# Patient Record
Sex: FEMALE | Race: WHITE | NOT HISPANIC OR LATINO | Employment: OTHER | URBAN - METROPOLITAN AREA
[De-identification: names, ages, dates, MRNs, and addresses within clinical notes are randomized per-mention and may not be internally consistent; named-entity substitution may affect disease eponyms.]

---

## 2018-04-09 LAB
ALBUMIN (HISTORICAL): 2.5 MG/DL
ALBUMIN SERPL BCP-MCNC: 4 G/DL (ref 3.5–5.7)
ALP SERPL-CCNC: 63 IU/L (ref 55–165)
ALT SERPL W P-5'-P-CCNC: 14 IU/L (ref 10–30)
ANION GAP SERPL CALCULATED.3IONS-SCNC: 10.1 MM/L
AST SERPL W P-5'-P-CCNC: 13 U/L (ref 7–26)
BACTERIA UR QL AUTO: ABNORMAL
BASOPHILS # BLD AUTO: 0 X3/UL (ref 0–0.3)
BASOPHILS # BLD AUTO: 0.7 % (ref 0–2)
BILIRUB SERPL-MCNC: 0.9 MG/DL (ref 0.3–1)
BILIRUB UR QL STRIP: NEGATIVE
BUN SERPL-MCNC: 16 MG/DL (ref 7–25)
CALCIUM SERPL-MCNC: 9.3 MG/DL (ref 8.6–10.5)
CHLORIDE SERPL-SCNC: 102 MM/L (ref 98–107)
CHOLEST SERPL-MCNC: 104 MG/DL (ref 0–200)
CLARITY UR: ABNORMAL
CO2 SERPL-SCNC: 30 MM/L (ref 21–31)
COLOR UR: YELLOW
CREAT SERPL-MCNC: 0.75 MG/DL (ref 0.6–1.2)
DEPRECATED RDW RBC AUTO: 14.2 % (ref 11.5–14.5)
EGFR (HISTORICAL): > 60 GFR
EGFR AFRICAN AMERICAN (HISTORICAL): > 60 GFR
EOSINOPHIL # BLD AUTO: 0.2 X3/UL (ref 0–0.5)
EOSINOPHIL NFR BLD AUTO: 4.1 % (ref 0–5)
EST. AVERAGE GLUCOSE BLD GHB EST-MCNC: 302 MG/DL
GLUCOSE (HISTORICAL): 224 MG/DL (ref 65–99)
GLUCOSE UR STRIP-MCNC: ABNORMAL MG/DL
HBA1C MFR BLD HPLC: 12.2 % (ref 4–6.2)
HCT VFR BLD AUTO: 40.7 % (ref 37–47)
HDLC SERPL-MCNC: 18 MG/DL (ref 40–60)
HGB BLD-MCNC: 13.4 G/DL (ref 12–16)
HGB UR QL STRIP.AUTO: NEGATIVE
KETONES UR STRIP-MCNC: NEGATIVE MG/DL
LDLC SERPL CALC-MCNC: 56.8 MG/DL (ref 75–193)
LEUKOCYTE ESTERASE UR QL STRIP: ABNORMAL
LYMPHOCYTES # BLD AUTO: 1.9 X3/UL (ref 1.2–4.2)
LYMPHOCYTES NFR BLD AUTO: 35.5 % (ref 20.5–51.1)
MCH RBC QN AUTO: 27.1 PG (ref 26–34)
MCHC RBC AUTO-ENTMCNC: 32.8 G/DL (ref 31–36)
MCV RBC AUTO: 82.6 FL (ref 81–99)
MONOCYTES # BLD AUTO: 0.3 X3/UL (ref 0–1)
MONOCYTES NFR BLD AUTO: 5.3 % (ref 1.7–12)
NEUTROPHILS # BLD AUTO: 2.9 X3/UL (ref 1.4–6.5)
NEUTS SEG NFR BLD AUTO: 54.4 % (ref 42.2–75.2)
NITRITE UR QL STRIP: NEGATIVE
NON-SQ EPI CELLS URNS QL MICRO: ABNORMAL /HPF
OSMOLALITY, SERUM (HISTORICAL): 284 MOSM (ref 262–291)
PH UR STRIP.AUTO: 5.5 [PH] (ref 4.5–8)
PLATELET # BLD AUTO: 303 X3/UL (ref 130–400)
PMV BLD AUTO: 7.9 FL (ref 8.6–11.7)
POTASSIUM SERPL-SCNC: 4.1 MM/L (ref 3.5–5.5)
PROT UR STRIP-MCNC: NEGATIVE MG/DL
RBC # BLD AUTO: 4.93 X6/UL (ref 3.9–5.2)
RBC #/AREA URNS AUTO: ABNORMAL /HPF
SODIUM SERPL-SCNC: 138 MM/L (ref 134–143)
SP GR UR STRIP.AUTO: 1.02 (ref 1–1.03)
TOTAL PROTEIN (HISTORICAL): 6.6 G/DL (ref 6.4–8.9)
TRIGL SERPL-MCNC: 147 MG/DL (ref 44–166)
TSH SERPL DL<=0.05 MIU/L-ACNC: 3.4 UIU/M (ref 0.45–5.33)
UROBILINOGEN UR QL STRIP.AUTO: 0.2 EU/DL (ref 0.2–8)
VLDL CHOLESTEROL (HISTORICAL): 29 MG/DL (ref 5–51)
WBC # BLD AUTO: 5.2 X3/UL (ref 4.8–10.8)
WBC #/AREA URNS AUTO: ABNORMAL /HPF

## 2018-09-10 ENCOUNTER — TRANSCRIBE ORDERS (OUTPATIENT)
Dept: ADMINISTRATIVE | Facility: HOSPITAL | Age: 68
End: 2018-09-10

## 2018-09-10 ENCOUNTER — APPOINTMENT (OUTPATIENT)
Dept: LAB | Facility: CLINIC | Age: 68
End: 2018-09-10
Payer: COMMERCIAL

## 2018-09-10 DIAGNOSIS — R94.31 ABNORMAL FINDING ON EKG: ICD-10-CM

## 2018-09-10 DIAGNOSIS — I25.2 OLD MYOCARDIAL INFARCTION: ICD-10-CM

## 2018-09-10 DIAGNOSIS — R94.31 ABNORMAL FINDING ON EKG: Primary | ICD-10-CM

## 2018-09-10 DIAGNOSIS — R19.7 DIARRHEA, UNSPECIFIED TYPE: ICD-10-CM

## 2018-09-10 DIAGNOSIS — E11.9 TYPE 2 DIABETES MELLITUS WITHOUT COMPLICATION, UNSPECIFIED WHETHER LONG TERM INSULIN USE (HCC): ICD-10-CM

## 2018-09-10 DIAGNOSIS — F41.9 ANXIETY: ICD-10-CM

## 2018-09-10 DIAGNOSIS — I10 ESSENTIAL HYPERTENSION, MALIGNANT: ICD-10-CM

## 2018-09-10 DIAGNOSIS — I25.119 CORONARY ARTERY DISEASE WITH ANGINA PECTORIS, UNSPECIFIED VESSEL OR LESION TYPE, UNSPECIFIED WHETHER NATIVE OR TRANSPLANTED HEART (HCC): ICD-10-CM

## 2018-09-10 DIAGNOSIS — E78.5 HYPERLIPIDEMIA, UNSPECIFIED HYPERLIPIDEMIA TYPE: ICD-10-CM

## 2018-09-10 LAB
ALBUMIN SERPL BCP-MCNC: 3.5 G/DL (ref 3.5–5)
ALP SERPL-CCNC: 47 U/L (ref 46–116)
ALT SERPL W P-5'-P-CCNC: 20 U/L (ref 12–78)
ANION GAP SERPL CALCULATED.3IONS-SCNC: 9 MMOL/L (ref 4–13)
AST SERPL W P-5'-P-CCNC: 14 U/L (ref 5–45)
BILIRUB SERPL-MCNC: 0.84 MG/DL (ref 0.2–1)
BUN SERPL-MCNC: 16 MG/DL (ref 5–25)
CALCIUM SERPL-MCNC: 8.7 MG/DL (ref 8.3–10.1)
CHLORIDE SERPL-SCNC: 106 MMOL/L (ref 100–108)
CHOLEST SERPL-MCNC: 92 MG/DL (ref 50–200)
CO2 SERPL-SCNC: 27 MMOL/L (ref 21–32)
CREAT SERPL-MCNC: 0.76 MG/DL (ref 0.6–1.3)
GFR SERPL CREATININE-BSD FRML MDRD: 80 ML/MIN/1.73SQ M
GLUCOSE P FAST SERPL-MCNC: 71 MG/DL (ref 65–99)
HDLC SERPL-MCNC: 23 MG/DL (ref 40–60)
LDLC SERPL CALC-MCNC: 50 MG/DL (ref 0–100)
NONHDLC SERPL-MCNC: 69 MG/DL
POTASSIUM SERPL-SCNC: 4.1 MMOL/L (ref 3.5–5.3)
PROT SERPL-MCNC: 7 G/DL (ref 6.4–8.2)
SODIUM SERPL-SCNC: 142 MMOL/L (ref 136–145)
TRIGL SERPL-MCNC: 94 MG/DL
TSH SERPL DL<=0.05 MIU/L-ACNC: 3.24 UIU/ML (ref 0.36–3.74)

## 2018-09-10 PROCEDURE — 80061 LIPID PANEL: CPT

## 2018-09-10 PROCEDURE — 84443 ASSAY THYROID STIM HORMONE: CPT

## 2018-09-10 PROCEDURE — 36415 COLL VENOUS BLD VENIPUNCTURE: CPT

## 2018-09-10 PROCEDURE — 80053 COMPREHEN METABOLIC PANEL: CPT

## 2018-09-10 PROCEDURE — 83036 HEMOGLOBIN GLYCOSYLATED A1C: CPT

## 2018-09-11 LAB
EST. AVERAGE GLUCOSE BLD GHB EST-MCNC: 220 MG/DL
HBA1C MFR BLD: 9.3 % (ref 4.2–6.3)

## 2019-01-14 ENCOUNTER — TELEPHONE (OUTPATIENT)
Dept: FAMILY MEDICINE CLINIC | Facility: CLINIC | Age: 69
End: 2019-01-14

## 2019-01-14 DIAGNOSIS — E11.65 UNCONTROLLED TYPE 2 DIABETES MELLITUS WITH HYPERGLYCEMIA (HCC): Primary | ICD-10-CM

## 2019-01-14 NOTE — TELEPHONE ENCOUNTER
Please check old dosage on this medication to see if patient gets three-month supply or one-month supply  Also if this is metformin  mg or plain metformin 500 mg? It is approved to refill a three-month supply  Check her appointment scheduled to see if she has an upcoming appointment  Many patients were [facility]  Also check for Medicare wellness exam and due date

## 2019-01-15 RX ORDER — METFORMIN HYDROCHLORIDE 500 MG/1
TABLET, EXTENDED RELEASE ORAL
Qty: 180 TABLET | Refills: 0 | Status: SHIPPED | OUTPATIENT
Start: 2019-01-15 | End: 2019-05-02 | Stop reason: SDUPTHER

## 2019-01-15 NOTE — TELEPHONE ENCOUNTER
Please check when patient is due for appointment  Lab work an appointment may be due soon if she has not been seen within 6 months  Medication will be refilled this time

## 2019-02-25 ENCOUNTER — OFFICE VISIT (OUTPATIENT)
Dept: FAMILY MEDICINE CLINIC | Facility: CLINIC | Age: 69
End: 2019-02-25
Payer: COMMERCIAL

## 2019-02-25 VITALS
WEIGHT: 201 LBS | OXYGEN SATURATION: 98 % | DIASTOLIC BLOOD PRESSURE: 82 MMHG | SYSTOLIC BLOOD PRESSURE: 142 MMHG | HEIGHT: 65 IN | BODY MASS INDEX: 33.49 KG/M2 | HEART RATE: 78 BPM

## 2019-02-25 DIAGNOSIS — I10 ESSENTIAL HYPERTENSION: ICD-10-CM

## 2019-02-25 DIAGNOSIS — E11.65 UNCONTROLLED TYPE 2 DIABETES MELLITUS WITH HYPERGLYCEMIA (HCC): Primary | ICD-10-CM

## 2019-02-25 DIAGNOSIS — E78.2 MIXED HYPERLIPIDEMIA: ICD-10-CM

## 2019-02-25 PROCEDURE — 3008F BODY MASS INDEX DOCD: CPT | Performed by: FAMILY MEDICINE

## 2019-02-25 PROCEDURE — 3725F SCREEN DEPRESSION PERFORMED: CPT | Performed by: FAMILY MEDICINE

## 2019-02-25 PROCEDURE — 4010F ACE/ARB THERAPY RXD/TAKEN: CPT | Performed by: FAMILY MEDICINE

## 2019-02-25 PROCEDURE — 1036F TOBACCO NON-USER: CPT | Performed by: FAMILY MEDICINE

## 2019-02-25 PROCEDURE — 1160F RVW MEDS BY RX/DR IN RCRD: CPT | Performed by: FAMILY MEDICINE

## 2019-02-25 PROCEDURE — 99214 OFFICE O/P EST MOD 30 MIN: CPT | Performed by: FAMILY MEDICINE

## 2019-02-25 PROCEDURE — 1101F PT FALLS ASSESS-DOCD LE1/YR: CPT | Performed by: FAMILY MEDICINE

## 2019-02-25 RX ORDER — ATORVASTATIN CALCIUM 40 MG/1
40 TABLET, FILM COATED ORAL DAILY
COMMUNITY
End: 2019-02-25 | Stop reason: SDUPTHER

## 2019-02-25 RX ORDER — LISINOPRIL AND HYDROCHLOROTHIAZIDE 20; 12.5 MG/1; MG/1
TABLET ORAL
COMMUNITY
Start: 2011-10-17 | End: 2019-02-25 | Stop reason: CLARIF

## 2019-02-25 RX ORDER — IBUPROFEN 200 MG
TABLET ORAL EVERY 6 HOURS PRN
COMMUNITY
End: 2019-06-04

## 2019-02-25 RX ORDER — LISINOPRIL 5 MG/1
5 TABLET ORAL DAILY
COMMUNITY
End: 2019-02-25 | Stop reason: SDUPTHER

## 2019-02-25 RX ORDER — METOPROLOL SUCCINATE 50 MG/1
25 TABLET, EXTENDED RELEASE ORAL DAILY
Qty: 90 TABLET | Refills: 1 | Status: SHIPPED | OUTPATIENT
Start: 2019-02-25 | End: 2019-06-04

## 2019-02-25 RX ORDER — METOPROLOL SUCCINATE 50 MG/1
25 TABLET, EXTENDED RELEASE ORAL DAILY
COMMUNITY
End: 2019-02-25 | Stop reason: SDUPTHER

## 2019-02-25 RX ORDER — LISINOPRIL 5 MG/1
5 TABLET ORAL DAILY
Qty: 90 TABLET | Refills: 1 | Status: SHIPPED | OUTPATIENT
Start: 2019-02-25 | End: 2019-09-14 | Stop reason: SDUPTHER

## 2019-02-25 RX ORDER — ALPRAZOLAM 0.5 MG/1
0.5 TABLET ORAL
COMMUNITY
Start: 2011-10-17 | End: 2019-07-09

## 2019-02-25 RX ORDER — ATORVASTATIN CALCIUM 40 MG/1
40 TABLET, FILM COATED ORAL DAILY
Qty: 90 TABLET | Refills: 1 | Status: SHIPPED | OUTPATIENT
Start: 2019-02-25 | End: 2019-10-16 | Stop reason: SDUPTHER

## 2019-02-25 NOTE — ASSESSMENT & PLAN NOTE
Mixed hyperlipidemia labs show cholesterol levels are down based on her medications and her statin drug I will continue her on this at this point and follow up at next office visit

## 2019-02-25 NOTE — PATIENT INSTRUCTIONS
Diabetes in the Older Adult   AMBULATORY CARE:   What you need to know if you are an older adult with diabetes:  Older adults with diabetes are at risk for heart disease, stroke, kidney disease, blindness, and nerve damage  You may also be at risk for any of the following:  · Poor nutrition or low blood sugar levels    · Confusion or problems with memory, attention, or learning new things    · Trouble controlling urination or frequent urinary tract infections    · Trouble with coordination or balance    · Falls and injuries    · Pain    · Depression    · Open sores on your legs or feet  The ABCs of diabetes: The ABCs stand for certain things you can do to manage or prevent problems caused by diabetes:  · A  stands for A1c test   This test shows the average amount of sugar in your blood over the past 2 to 3 months  High levels of sugar in your blood can cause damage to your heart, blood vessels, kidneys, feet, and eyes  Most older adults with diabetes should have an A1c level less than 7 5  Ask your healthcare provider if this A1c goal is right for you  Your provider can help you make changes if your A1c is too high  · B  stands for blood pressure   High blood pressure can increase your risk for a heart attack, stroke, or kidney disease  Most older adults with diabetes should have a systolic blood pressure (first number) of 140  Your diastolic blood pressure (second number) should be below 90  Ask your healthcare provider if these blood pressure goals are right for you  · C  stands for cholesterol   High levels of cholesterol can block your arteries and cause a heart attack or stroke  Ask your healthcare provider what your cholesterol levels should be  · S  stands for stop smoking   Nicotine and other chemicals in cigarettes and cigars can cause lung damage and make it more difficult to manage your diabetes    Call 911 if you have any of the following:   · You have any of the following signs of a stroke: ¨ Numbness or drooping on one side of your face     ¨ Weakness in an arm or leg    ¨ Confusion or difficulty speaking    ¨ Dizziness, a severe headache, or vision loss    · You have any of the following signs of a heart attack:      ¨ Squeezing, pressure, or pain in your chest that lasts longer than 5 minutes or returns    ¨ Discomfort or pain in your back, neck, jaw, stomach, or arm     ¨ Trouble breathing    ¨ Nausea or vomiting    ¨ Lightheadedness or a sudden cold sweat, especially with chest pain or trouble breathing  Seek care immediately if:   · You have severe abdominal pain, or the pain spreads to your back  You may also be vomiting  · You have trouble staying awake or focusing  · You are shaking or sweating  · You have blurred or double vision  · Your breath has a fruity, sweet smell  · Your breathing is deep and labored, or rapid and shallow  · Your heartbeat is fast and weak  · You fall and get hurt  Contact your healthcare provider if:   · You are vomiting or have diarrhea  · You have an upset stomach and cannot eat the foods on your meal plan  · You feel weak or more tired than usual      · You feel dizzy, have headaches, or are easily irritated  · Your skin is red, warm, dry, or swollen  · You have a wound that does not heal      · You have numbness in your arms or legs  · You have trouble coping with your illness, or you feel anxious or depressed  · You have problems with your memory  · You have changes in your vision  · You have questions or concerns about your condition or care  Treatment for diabetes  includes keeping your blood sugar at a normal level  You must eat the right foods, and exercise regularly  You may need medicine if you cannot control your blood sugar level with nutrition and exercise  You may also need medicine to lower your blood pressure or cholesterol, or medicine to prevent blood clots     Manage the ABCs and prevent problems caused by diabetes:   · Check your blood sugar levels as directed  Your healthcare provider will tell you when and how often to check during the day  Your healthcare provider will also tell you what your blood sugar levels should be before and after a meal  You may need to check for ketones in your urine or blood if your level is higher than directed  Write down your results and show them to your healthcare provider  Your provider may use the results to make changes to your medicine, food, or exercise schedules  Ask your healthcare provider for more information about how to treat a high or low blood sugar level  · Follow your meal plan as directed  A dietitian will help you make a meal plan to keep your blood sugar level steady and make sure you get enough nutrition  Do not skip meals  Your blood sugar level may drop too low if you have taken diabetes medicine and do not eat  Ask your healthcare provider about programs in your community that can deliver the meals to your home  · Try to be active for 30 to 60 minutes most days of the week  Exercise can help keep your blood sugar level steady, decrease your risk of heart disease, and help you lose weight  It can also help improve your balance and decrease your risk for falls  Work with your healthcare provider to create an exercise plan  Ask a family member or friend to exercise with you  Start slow and exercise for 5 to 10 minutes at a time  Examples of activities include walking or swimming  Include muscle strengthening activities 2 to 3 days each week  Include balance training 2 to 3 times each week  Activities that help increase balance include yoga and debbie chi      · Maintain a healthy weight  Ask your healthcare provider how much you should weigh  A healthy weight can help you control your diabetes and prevent heart disease  Ask your provider to help you create a weight loss plan if you are overweight   Together you can set manageable weight loss goals  · Do not smoke  Ask your healthcare provider for information if you currently smoke and need help to quit  Do not use e-cigarettes or smokeless tobacco in place of cigarettes or to help you quit  They still contain nicotine  · Manage stress  Stress may increase your blood sugar level  Deep breathing, muscle relaxation, and music may help you relax  Ask your healthcare provider for more information about these practices  Other ways to manage your diabetes:   · Check your feet every day for sores  Look at your whole foot, including the bottom, and between and under your toes  Check for wounds, corns, and calluses  Use a mirror to see the bottom of your feet  The skin on your feet may be shiny, tight, dry, or darker than normal  Your feet may also be cold and pale  Feel your feet by running your hands along the tops, bottoms, sides, and between your toes  Redness, swelling, and warmth are signs of blood flow problems that can lead to a foot ulcer  Do not try to remove corns or calluses yourself  · Wear medical alert identification  Wear medical alert jewelry or carry a card that says you have diabetes  Ask your healthcare provider where to get these items  · Ask about vaccines  You have a higher risk for serious illness if you get the flu, pneumonia, or hepatitis  Ask your healthcare provider if you should get a flu, pneumonia, shingles, or hepatitis B vaccine, and when to get the vaccine  · Keep all appointments  You may need to return to have your A1c checked every 3 months  You will need to return at least once each year to have your feet checked  You will need an eye exam once a year to check for retinopathy  You will also need urine tests every year to check for kidney problems  You may need tests to monitor for heart disease  Write down your questions so you remember to ask them during your visits  · Get help from family and friends    You may need help checking your blood sugar level, giving insulin injections, or preparing your meals  Ask your family and friends to help you with these tasks  Talk to your healthcare provider if you do not have someone at home to help you  A healthcare provider can come to your home to help you with these tasks  Follow up with your healthcare provider as directed: You may need to return to have your A1c checked every 3 months  You will need to return at least once each year to have your feet checked  You will need an eye exam once a year to check for retinopathy  You will also need urine tests every year to check for kidney problems  You may need tests to monitor for heart disease  Write down your questions so you remember to ask them during your visits  © 2017 [facility] Information is for End User's use only and may not be sold, redistributed or otherwise used for commercial purposes  All illustrations and images included in CareNotes® are the copyrighted property of [employer]  or Lazarus [employer]  The above information is an  only  It is not intended as medical advice for individual conditions or treatments  Talk to your doctor, nurse or pharmacist before following any medical regimen to see if it is safe and effective for you

## 2019-02-25 NOTE — ASSESSMENT & PLAN NOTE
Lab Results   Component Value Date    HGBA1C 9 3 (H) 09/10/2018       No results for input(s): POCGLU in the last 72 hours  Blood Sugar Average: Last 72 hrs:   patient presents today for general checkup her last A1c was done back in September about 6 months ago and she is due now for lab work in follow-up evaluation  I will order CMP and A1c for her next office visit follow-up in the spring time continue her current medications at this point she is aware that her levels are out of control and needs better diet and exercise now going into the spring recheck everything at the three-month interval patient has been seeing Ophthalmology frequently she had a retinal detachment and she is following up with them regarding her diabetes as well

## 2019-02-25 NOTE — PROGRESS NOTES
Assessment/Plan:       Problem List Items Addressed This Visit        Endocrine    Uncontrolled type 2 diabetes mellitus with hyperglycemia ([facility] [address] ) - Primary     Lab Results   Component Value Date    HGBA1C 9 3 (H) 09/10/2018       No results for input(s): POCGLU in the last 72 hours  Blood Sugar Average: Last 72 hrs:   patient presents today for general checkup her last A1c was done back in September about 6 months ago and she is due now for lab work in follow-up evaluation  I will order CMP and A1c for her next office visit follow-up in the spring time continue her current medications at this point she is aware that her levels are out of control and needs better diet and exercise now going into the spring recheck everything at the three-month interval patient has been seeing Ophthalmology frequently she had a retinal detachment and she is following up with them regarding her diabetes as well           Relevant Medications    insulin NPH-insulin regular (NovoLIN 70/30) 100 units/mL subcutaneous injection    Insulin Glargine (TOUJEO MAX SOLOSTAR) 300 units/mL CONCETRATED U-300 injection pen    Other Relevant Orders    Comprehensive metabolic panel    Hemoglobin A1C    Microalbumin / creatinine urine ratio       Cardiovascular and Mediastinum    Essential hypertension     Hypertension controlled on current medications no change recheck in 3 months         Relevant Medications    metoprolol succinate (TOPROL-XL) 50 mg 24 hr tablet    lisinopril (ZESTRIL) 5 mg tablet    Other Relevant Orders    CBC and differential    TSH, 3rd generation with Free T4 reflex       Other    Mixed hyperlipidemia     Mixed hyperlipidemia labs show cholesterol levels are down based on her medications and her statin drug I will continue her on this at this point and follow up at next office visit         Relevant Medications    atorvastatin (LIPITOR) 40 mg tablet    Other Relevant Orders    Lipid panel            Subjective:      Patient ID: Nivia Hummel is a 77 y o  female  Patient presents today for follow-up evaluation and general checkup medication renewal and review of her overall health status she will need lab work done for the next office visit at this point continue all current medications she notes no changes in her health overall  The following portions of the patient's history were reviewed and updated as appropriate: allergies, current medications, past family history, past medical history, past social history, past surgical history and problem list     Review of Systems   Constitutional: Negative for chills, fatigue and fever  HENT: Negative for congestion, nosebleeds, rhinorrhea, sinus pressure and sore throat  Eyes: Negative for discharge and redness  Respiratory: Negative for cough and shortness of breath  Cardiovascular: Negative for chest pain, palpitations and leg swelling  Gastrointestinal: Negative for abdominal pain, blood in stool and nausea  Endocrine: Negative for cold intolerance, heat intolerance and polyuria  Genitourinary: Negative for dysuria and frequency  Musculoskeletal: Negative for arthralgias, back pain and myalgias  Skin: Negative for rash  Neurological: Negative for dizziness, weakness and headaches  Hematological: Negative for adenopathy  Psychiatric/Behavioral: Negative for behavioral problems and sleep disturbance  The patient is not nervous/anxious  Objective:      /82 (BP Location: Left arm, Patient Position: Sitting)   Pulse 78   Ht 5' 5" (1 651 m)   Wt 91 2 kg (201 lb)   SpO2 98%   BMI 33 45 kg/m²        Physical Exam   Constitutional: She is oriented to person, place, and time  She appears well-developed and well-nourished  No distress  HENT:   Head: Normocephalic and atraumatic  Right Ear: External ear normal    Left Ear: External ear normal    Nose: Nose normal    Mouth/Throat: Oropharynx is clear and moist  No oropharyngeal exudate     Eyes: Pupils are equal, round, and reactive to light  Conjunctivae and EOM are normal  Right eye exhibits no discharge  Left eye exhibits no discharge  No scleral icterus  Neck: Normal range of motion  No JVD present  No thyromegaly present  Cardiovascular: Normal rate, regular rhythm and normal heart sounds  No murmur heard  Pulmonary/Chest: Effort normal  She has no wheezes  She has no rales  She exhibits no tenderness  Abdominal: Soft  Bowel sounds are normal  She exhibits no distension and no mass  There is no tenderness  Musculoskeletal: Normal range of motion  She exhibits no edema, tenderness or deformity  Lymphadenopathy:     She has no cervical adenopathy  Neurological: She is alert and oriented to person, place, and time  She has normal reflexes  She displays normal reflexes  No cranial nerve deficit  Coordination normal    Skin: Skin is warm and dry  No rash noted  Psychiatric: She has a normal mood and affect  Her behavior is normal  Judgment and thought content normal    Nursing note and vitals reviewed         Data:    Laboratory Results:   Radiology/Other Diagnostic Testing Results:      Lab Results   Component Value Date    WBC 5 2 04/09/2018    HGB 13 4 04/09/2018    HCT 40 7 04/09/2018    MCV 82 6 04/09/2018     04/09/2018     Lab Results   Component Value Date     04/09/2018    K 4 1 09/10/2018     09/10/2018    CO2 27 09/10/2018    ANIONGAP 10 1 04/09/2018    BUN 16 09/10/2018    CREATININE 0 76 09/10/2018    GLUF 71 09/10/2018    CALCIUM 8 7 09/10/2018    AST 14 09/10/2018    ALT 20 09/10/2018    ALKPHOS 47 09/10/2018    PROT 6 6 04/09/2018    BILITOT 0 9 04/09/2018    EGFR 80 09/10/2018     Lab Results   Component Value Date    CHOLESTEROL 92 09/10/2018     Lab Results   Component Value Date    HDL 23 (L) 09/10/2018    HDL 18 (L) 04/09/2018     Lab Results   Component Value Date    LDLCALC 50 09/10/2018    LDLCALC 56 8 (L) 04/09/2018     Lab Results   Component Value Date    TRIG 94 09/10/2018    TRIG 147 04/09/2018     No results found for: [place], [place]  Lab Results   Component Value Date    OXC7BLJJITSP 3 240 09/10/2018     Lab Results   Component Value Date    HGBA1C 9 3 (H) 09/10/2018     No results found for: ALISIA Martin DO

## 2019-03-01 LAB
LEFT EYE DIABETIC RETINOPATHY: NORMAL
RIGHT EYE DIABETIC RETINOPATHY: NORMAL

## 2019-05-02 DIAGNOSIS — E11.65 UNCONTROLLED TYPE 2 DIABETES MELLITUS WITH HYPERGLYCEMIA (HCC): ICD-10-CM

## 2019-05-02 RX ORDER — METFORMIN HYDROCHLORIDE 500 MG/1
TABLET, EXTENDED RELEASE ORAL
Qty: 180 TABLET | Refills: 1 | Status: SHIPPED | OUTPATIENT
Start: 2019-05-02 | End: 2019-10-16 | Stop reason: SDUPTHER

## 2019-05-31 ENCOUNTER — TRANSCRIBE ORDERS (OUTPATIENT)
Dept: ADMINISTRATIVE | Facility: HOSPITAL | Age: 69
End: 2019-05-31

## 2019-05-31 ENCOUNTER — APPOINTMENT (OUTPATIENT)
Dept: LAB | Facility: HOSPITAL | Age: 69
End: 2019-05-31
Attending: FAMILY MEDICINE
Payer: COMMERCIAL

## 2019-05-31 DIAGNOSIS — I10 ESSENTIAL HYPERTENSION: ICD-10-CM

## 2019-05-31 DIAGNOSIS — E78.2 MIXED HYPERLIPIDEMIA: ICD-10-CM

## 2019-05-31 DIAGNOSIS — E11.65 UNCONTROLLED TYPE 2 DIABETES MELLITUS WITH HYPERGLYCEMIA (HCC): ICD-10-CM

## 2019-05-31 LAB
ALBUMIN SERPL BCP-MCNC: 4.1 G/DL (ref 3.5–5.7)
ALP SERPL-CCNC: 60 U/L (ref 55–165)
ALT SERPL W P-5'-P-CCNC: 11 U/L (ref 7–52)
ANION GAP SERPL CALCULATED.3IONS-SCNC: 9 MMOL/L (ref 4–13)
AST SERPL W P-5'-P-CCNC: 12 U/L (ref 13–39)
BASOPHILS # BLD AUTO: 0 THOUSANDS/ΜL (ref 0–0.1)
BASOPHILS NFR BLD AUTO: 1 % (ref 0–1)
BILIRUB SERPL-MCNC: 1 MG/DL (ref 0.2–1)
BUN SERPL-MCNC: 11 MG/DL (ref 7–25)
CALCIUM SERPL-MCNC: 9.5 MG/DL (ref 8.6–10.5)
CHLORIDE SERPL-SCNC: 103 MMOL/L (ref 98–107)
CHOLEST SERPL-MCNC: 90 MG/DL (ref 0–200)
CO2 SERPL-SCNC: 28 MMOL/L (ref 21–31)
CREAT SERPL-MCNC: 0.82 MG/DL (ref 0.6–1.2)
EOSINOPHIL # BLD AUTO: 0.3 THOUSAND/ΜL (ref 0–0.61)
EOSINOPHIL NFR BLD AUTO: 5 % (ref 0–6)
ERYTHROCYTE [DISTWIDTH] IN BLOOD BY AUTOMATED COUNT: 14.3 % (ref 11.6–15.1)
EST. AVERAGE GLUCOSE BLD GHB EST-MCNC: 278 MG/DL
GFR SERPL CREATININE-BSD FRML MDRD: 73 ML/MIN/1.73SQ M
GLUCOSE P FAST SERPL-MCNC: 231 MG/DL (ref 65–99)
HBA1C MFR BLD: 11.3 % (ref 4.2–6.3)
HCT VFR BLD AUTO: 42 % (ref 37–47)
HDLC SERPL-MCNC: 19 MG/DL (ref 40–60)
HGB BLD-MCNC: 14.1 G/DL (ref 11.5–15.4)
LDLC SERPL CALC-MCNC: 43 MG/DL (ref 0–100)
LYMPHOCYTES # BLD AUTO: 2.1 THOUSANDS/ΜL (ref 0.6–4.47)
LYMPHOCYTES NFR BLD AUTO: 37 % (ref 14–44)
MCH RBC QN AUTO: 28.4 PG (ref 26.8–34.3)
MCHC RBC AUTO-ENTMCNC: 33.6 G/DL (ref 31.4–37.4)
MCV RBC AUTO: 85 FL (ref 82–98)
MONOCYTES # BLD AUTO: 0.3 THOUSAND/ΜL (ref 0.17–1.22)
MONOCYTES NFR BLD AUTO: 5 % (ref 4–12)
NEUTROPHILS # BLD AUTO: 2.9 THOUSANDS/ΜL (ref 1.85–7.62)
NEUTS SEG NFR BLD AUTO: 52 % (ref 43–75)
NONHDLC SERPL-MCNC: 71 MG/DL
PLATELET # BLD AUTO: 282 THOUSANDS/UL (ref 149–390)
PMV BLD AUTO: 7.4 FL (ref 8.9–12.7)
POTASSIUM SERPL-SCNC: 4.2 MMOL/L (ref 3.5–5.5)
PROT SERPL-MCNC: 6.8 G/DL (ref 6.4–8.9)
RBC # BLD AUTO: 4.96 MILLION/UL (ref 3.81–5.12)
SODIUM SERPL-SCNC: 140 MMOL/L (ref 134–143)
TRIGL SERPL-MCNC: 140 MG/DL (ref 44–166)
TSH SERPL DL<=0.05 MIU/L-ACNC: 3.75 UIU/ML (ref 0.45–5.33)
WBC # BLD AUTO: 5.6 THOUSAND/UL (ref 4.31–10.16)

## 2019-05-31 PROCEDURE — 84443 ASSAY THYROID STIM HORMONE: CPT

## 2019-05-31 PROCEDURE — 85025 COMPLETE CBC W/AUTO DIFF WBC: CPT

## 2019-05-31 PROCEDURE — 83036 HEMOGLOBIN GLYCOSYLATED A1C: CPT

## 2019-05-31 PROCEDURE — 80061 LIPID PANEL: CPT

## 2019-05-31 PROCEDURE — 36415 COLL VENOUS BLD VENIPUNCTURE: CPT

## 2019-05-31 PROCEDURE — 80053 COMPREHEN METABOLIC PANEL: CPT

## 2019-06-04 ENCOUNTER — OFFICE VISIT (OUTPATIENT)
Dept: FAMILY MEDICINE CLINIC | Facility: CLINIC | Age: 69
End: 2019-06-04
Payer: COMMERCIAL

## 2019-06-04 VITALS
HEIGHT: 65 IN | TEMPERATURE: 99.3 F | BODY MASS INDEX: 34.32 KG/M2 | SYSTOLIC BLOOD PRESSURE: 140 MMHG | OXYGEN SATURATION: 96 % | WEIGHT: 206 LBS | DIASTOLIC BLOOD PRESSURE: 84 MMHG | HEART RATE: 80 BPM

## 2019-06-04 DIAGNOSIS — E78.2 MIXED HYPERLIPIDEMIA: ICD-10-CM

## 2019-06-04 DIAGNOSIS — Z11.59 ENCOUNTER FOR HEPATITIS C SCREENING TEST FOR LOW RISK PATIENT: ICD-10-CM

## 2019-06-04 DIAGNOSIS — E11.65 UNCONTROLLED TYPE 2 DIABETES MELLITUS WITH HYPERGLYCEMIA (HCC): Primary | ICD-10-CM

## 2019-06-04 DIAGNOSIS — Z12.39 BREAST CANCER SCREENING: ICD-10-CM

## 2019-06-04 DIAGNOSIS — I10 ESSENTIAL HYPERTENSION: ICD-10-CM

## 2019-06-04 DIAGNOSIS — Z12.11 COLON CANCER SCREENING: ICD-10-CM

## 2019-06-04 DIAGNOSIS — Z00.00 MEDICARE ANNUAL WELLNESS VISIT, SUBSEQUENT: ICD-10-CM

## 2019-06-04 PROCEDURE — 99214 OFFICE O/P EST MOD 30 MIN: CPT | Performed by: FAMILY MEDICINE

## 2019-06-04 PROCEDURE — 1125F AMNT PAIN NOTED PAIN PRSNT: CPT | Performed by: FAMILY MEDICINE

## 2019-06-04 PROCEDURE — G0439 PPPS, SUBSEQ VISIT: HCPCS | Performed by: FAMILY MEDICINE

## 2019-06-04 PROCEDURE — 1170F FXNL STATUS ASSESSED: CPT | Performed by: FAMILY MEDICINE

## 2019-06-18 ENCOUNTER — OFFICE VISIT (OUTPATIENT)
Dept: FAMILY MEDICINE CLINIC | Facility: CLINIC | Age: 69
End: 2019-06-18
Payer: COMMERCIAL

## 2019-06-18 VITALS
DIASTOLIC BLOOD PRESSURE: 80 MMHG | HEIGHT: 65 IN | OXYGEN SATURATION: 98 % | HEART RATE: 78 BPM | BODY MASS INDEX: 33.82 KG/M2 | WEIGHT: 203 LBS | SYSTOLIC BLOOD PRESSURE: 120 MMHG

## 2019-06-18 DIAGNOSIS — E78.2 MIXED HYPERLIPIDEMIA: ICD-10-CM

## 2019-06-18 DIAGNOSIS — K61.0 PERIANAL ABSCESS: ICD-10-CM

## 2019-06-18 DIAGNOSIS — E11.65 UNCONTROLLED TYPE 2 DIABETES MELLITUS WITH HYPERGLYCEMIA (HCC): Primary | ICD-10-CM

## 2019-06-18 DIAGNOSIS — F32.A ANXIETY AND DEPRESSION: ICD-10-CM

## 2019-06-18 DIAGNOSIS — F41.9 ANXIETY AND DEPRESSION: ICD-10-CM

## 2019-06-18 DIAGNOSIS — I10 ESSENTIAL HYPERTENSION: ICD-10-CM

## 2019-06-18 PROCEDURE — 3079F DIAST BP 80-89 MM HG: CPT | Performed by: FAMILY MEDICINE

## 2019-06-18 PROCEDURE — 3074F SYST BP LT 130 MM HG: CPT | Performed by: FAMILY MEDICINE

## 2019-06-18 PROCEDURE — 99214 OFFICE O/P EST MOD 30 MIN: CPT | Performed by: FAMILY MEDICINE

## 2019-06-18 RX ORDER — SERTRALINE HYDROCHLORIDE 25 MG/1
25 TABLET, FILM COATED ORAL DAILY
Qty: 30 TABLET | Refills: 5 | Status: SHIPPED | OUTPATIENT
Start: 2019-06-18 | End: 2019-07-09 | Stop reason: SDUPTHER

## 2019-06-19 ENCOUNTER — TELEPHONE (OUTPATIENT)
Dept: FAMILY MEDICINE CLINIC | Facility: CLINIC | Age: 69
End: 2019-06-19

## 2019-06-19 ENCOUNTER — TELEPHONE (OUTPATIENT)
Dept: SURGERY | Facility: CLINIC | Age: 69
End: 2019-06-19

## 2019-06-19 NOTE — TELEPHONE ENCOUNTER
Patient had been taking this because it was the least expensive of the insulins and she was getting at [address] her relatives are nurses and help her    Check and see if there is something else that she can have she can contact the pharmacy and have the family help her and then let me know and we will prescribe something different if needed

## 2019-06-24 ENCOUNTER — OFFICE VISIT (OUTPATIENT)
Dept: SURGERY | Facility: CLINIC | Age: 69
End: 2019-06-24
Payer: COMMERCIAL

## 2019-06-24 VITALS
RESPIRATION RATE: 18 BRPM | DIASTOLIC BLOOD PRESSURE: 78 MMHG | SYSTOLIC BLOOD PRESSURE: 122 MMHG | BODY MASS INDEX: 33.49 KG/M2 | TEMPERATURE: 99.3 F | HEIGHT: 65 IN | HEART RATE: 99 BPM | WEIGHT: 201 LBS

## 2019-06-24 DIAGNOSIS — K61.0 PERIANAL ABSCESS: Primary | ICD-10-CM

## 2019-06-24 PROCEDURE — 99201 PR OFFICE OUTPATIENT NEW 10 MINUTES: CPT | Performed by: SPECIALIST

## 2019-06-24 PROCEDURE — 87205 SMEAR GRAM STAIN: CPT | Performed by: SPECIALIST

## 2019-06-24 PROCEDURE — 87147 CULTURE TYPE IMMUNOLOGIC: CPT | Performed by: SPECIALIST

## 2019-06-24 PROCEDURE — 10060 I&D ABSCESS SIMPLE/SINGLE: CPT | Performed by: SPECIALIST

## 2019-06-24 PROCEDURE — 87186 SC STD MICRODIL/AGAR DIL: CPT | Performed by: SPECIALIST

## 2019-06-24 PROCEDURE — 87070 CULTURE OTHR SPECIMN AEROBIC: CPT | Performed by: SPECIALIST

## 2019-06-24 RX ORDER — SULFAMETHOXAZOLE AND TRIMETHOPRIM 800; 160 MG/1; MG/1
1 TABLET ORAL EVERY 12 HOURS SCHEDULED
Qty: 14 TABLET | Refills: 0 | Status: SHIPPED | OUTPATIENT
Start: 2019-06-24 | End: 2019-07-01

## 2019-06-24 RX ORDER — LISINOPRIL AND HYDROCHLOROTHIAZIDE 20; 12.5 MG/1; MG/1
TABLET ORAL
COMMUNITY
Start: 2011-10-17 | End: 2020-06-20 | Stop reason: HOSPADM

## 2019-06-24 NOTE — TELEPHONE ENCOUNTER
I believe this is the cheapest form of insulin that she can  and purchase she should check with the pharmacy and see if they have a cheaper form for her and then I can prescribe that

## 2019-06-27 LAB
BACTERIA WND AEROBE CULT: ABNORMAL
GRAM STN SPEC: ABNORMAL
GRAM STN SPEC: ABNORMAL

## 2019-07-01 ENCOUNTER — OFFICE VISIT (OUTPATIENT)
Dept: SURGERY | Facility: CLINIC | Age: 69
End: 2019-07-01

## 2019-07-01 VITALS — SYSTOLIC BLOOD PRESSURE: 118 MMHG | TEMPERATURE: 98.3 F | DIASTOLIC BLOOD PRESSURE: 61 MMHG

## 2019-07-01 DIAGNOSIS — K61.0 PERIANAL ABSCESS: Primary | ICD-10-CM

## 2019-07-01 PROCEDURE — 99024 POSTOP FOLLOW-UP VISIT: CPT | Performed by: SPECIALIST

## 2019-07-01 NOTE — ASSESSMENT & PLAN NOTE
The patient returns in follow up regarding a left gluteal abscess near the medial aspect of the gluteal fold  She is completing a course of Bactrim DS, and the culture returns as MSSA  She reports minimal residual discomfort, and on exam the induration has resolved, but there I&D site remains open with serous drainage  I have asked her to keep the incision dressed with a dry gauze dressing, and to apply Bactroban ointment twice a day until it is healed  I will see her back on an as needed basis

## 2019-07-01 NOTE — PROGRESS NOTES
Assessment/Plan:    Perianal abscess  The patient returns in follow up regarding a left gluteal abscess near the medial aspect of the gluteal fold  She is completing a course of Bactrim DS, and the culture returns as MSSA  She reports minimal residual discomfort, and on exam the induration has resolved, but there I&D site remains open with serous drainage  I have asked her to keep the incision dressed with a dry gauze dressing, and to apply Bactroban ointment twice a day until it is healed  I will see her back on an as needed basis  Diagnoses and all orders for this visit:    Perianal abscess  -     mupirocin (BACTROBAN) 2 % ointment; Apply topically 2 (two) times a day Apply to affected area twice daily          Subjective:      Patient ID: Luma Prieto is a 77 y o  female      HPI    The following portions of the patient's history were reviewed and updated as appropriate: allergies, current medications, past family history, past medical history, past social history, past surgical history and problem list     Review of Systems      Objective:      /61 (BP Location: Left arm, Patient Position: Sitting, Cuff Size: Standard)   Temp 98 3 °F (36 8 °C)          Physical Exam

## 2019-07-09 ENCOUNTER — OFFICE VISIT (OUTPATIENT)
Dept: FAMILY MEDICINE CLINIC | Facility: CLINIC | Age: 69
End: 2019-07-09
Payer: COMMERCIAL

## 2019-07-09 VITALS
WEIGHT: 198 LBS | BODY MASS INDEX: 32.99 KG/M2 | OXYGEN SATURATION: 95 % | HEART RATE: 85 BPM | DIASTOLIC BLOOD PRESSURE: 70 MMHG | HEIGHT: 65 IN | SYSTOLIC BLOOD PRESSURE: 120 MMHG | TEMPERATURE: 98.9 F

## 2019-07-09 DIAGNOSIS — I10 ESSENTIAL HYPERTENSION: Primary | ICD-10-CM

## 2019-07-09 DIAGNOSIS — K61.0 PERIANAL ABSCESS: ICD-10-CM

## 2019-07-09 DIAGNOSIS — E11.65 UNCONTROLLED TYPE 2 DIABETES MELLITUS WITH HYPERGLYCEMIA (HCC): ICD-10-CM

## 2019-07-09 DIAGNOSIS — F41.9 ANXIETY AND DEPRESSION: ICD-10-CM

## 2019-07-09 DIAGNOSIS — F32.A ANXIETY AND DEPRESSION: ICD-10-CM

## 2019-07-09 PROCEDURE — 3008F BODY MASS INDEX DOCD: CPT | Performed by: FAMILY MEDICINE

## 2019-07-09 PROCEDURE — 99214 OFFICE O/P EST MOD 30 MIN: CPT | Performed by: FAMILY MEDICINE

## 2019-07-09 PROCEDURE — 1036F TOBACCO NON-USER: CPT | Performed by: FAMILY MEDICINE

## 2019-07-09 PROCEDURE — 3074F SYST BP LT 130 MM HG: CPT | Performed by: FAMILY MEDICINE

## 2019-07-09 PROCEDURE — 1160F RVW MEDS BY RX/DR IN RCRD: CPT | Performed by: FAMILY MEDICINE

## 2019-07-09 RX ORDER — SERTRALINE HYDROCHLORIDE 25 MG/1
25 TABLET, FILM COATED ORAL DAILY
Qty: 90 TABLET | Refills: 2 | Status: SHIPPED | OUTPATIENT
Start: 2019-07-09 | End: 2021-01-14

## 2019-07-09 NOTE — PROGRESS NOTES
Assessment/Plan:       Problem List Items Addressed This Visit        Endocrine    Uncontrolled type 2 diabetes mellitus with hyperglycemia ([facility] [address] )     Lab Results   Component Value Date    HGBA1C 11 3 (H) 05/31/2019       No results for input(s): POCGLU in the last 72 hours  Blood Sugar Average: Last 72 hrs:   diabetes with poor control medications restarted after last office visit will re-evaluate with her upcoming scheduled office visit with lab work and A1c in the near future continue with current medications avoiding high fructose corn syrup and concentrated sweets watch diet closely and follow up at next office visit            Cardiovascular and Mediastinum    Essential hypertension - Primary     Essential hypertension controlled at this time on 5 mg lisinopril continue medication and follow up at next office visit            Other    Anxiety and depression     Anxiety and depression doing better at this point continue with current medication renew this at this time and increase the dose in the future if necessary I will re-evaluate her at her next appointment         Relevant Medications    sertraline (ZOLOFT) 25 mg tablet    Perianal abscess     Perianal abscess post treatment through general surgery follow-up with Dr Armond Alexander in general surgery for further care                 Subjective:      Patient ID: Kavitha Smith is a 77 y o  female  Patient presents today for follow-up evaluation for depression doing better with Zoloft 25mg  She additionally had a perianal abscess treated by General surgery Dr Armond Alexander and is doing better as well from the standpoint      The following portions of the patient's history were reviewed and updated as appropriate: allergies, current medications, past family history, past medical history, past social history, past surgical history and problem list     Review of Systems   Constitutional: Negative for chills, fatigue and fever     HENT: Negative for congestion, nosebleeds, rhinorrhea, sinus pressure and sore throat  Eyes: Negative for discharge and redness  Respiratory: Negative for cough and shortness of breath  Cardiovascular: Negative for chest pain, palpitations and leg swelling  Gastrointestinal: Negative for abdominal pain, blood in stool and nausea  Endocrine: Negative for cold intolerance, heat intolerance and polyuria  Genitourinary: Negative for dysuria and frequency  Musculoskeletal: Negative for arthralgias, back pain and myalgias  Skin: Negative for rash  Neurological: Negative for dizziness, weakness and headaches  Hematological: Negative for adenopathy  Psychiatric/Behavioral: Negative for behavioral problems and sleep disturbance  The patient is not nervous/anxious  Objective:      /70 (BP Location: Left arm, Patient Position: Sitting)   Pulse 85   Temp 98 9 °F (37 2 °C) (Tympanic)   Ht 5' 5" (1 651 m)   Wt 89 8 kg (198 lb)   SpO2 95%   BMI 32 95 kg/m²        Physical Exam   Constitutional: She is oriented to person, place, and time  She appears well-developed and well-nourished  No distress  HENT:   Head: Normocephalic and atraumatic  Right Ear: External ear normal    Left Ear: External ear normal    Nose: Nose normal    Mouth/Throat: Oropharynx is clear and moist  No oropharyngeal exudate  Eyes: Pupils are equal, round, and reactive to light  Conjunctivae and EOM are normal  Right eye exhibits no discharge  Left eye exhibits no discharge  No scleral icterus  Neck: Normal range of motion  No JVD present  No thyromegaly present  Cardiovascular: Normal rate, regular rhythm and normal heart sounds  No murmur heard  Pulmonary/Chest: Effort normal  She has no wheezes  She has no rales  She exhibits no tenderness  Abdominal: Soft  Bowel sounds are normal  She exhibits no distension and no mass  There is no tenderness  Musculoskeletal: Normal range of motion  She exhibits no edema, tenderness or deformity  Lymphadenopathy:     She has no cervical adenopathy  Neurological: She is alert and oriented to person, place, and time  She has normal reflexes  She displays normal reflexes  No cranial nerve deficit  Coordination normal    Skin: Skin is warm and dry  No rash noted  Psychiatric: She has a normal mood and affect  Her behavior is normal  Judgment and thought content normal    Nursing note and vitals reviewed  Data:    Laboratory Results: I have personally reviewed the pertinent laboratory results/reports   Radiology/Other Diagnostic Testing Results: I have personally reviewed pertinent reports         Lab Results   Component Value Date    WBC 5 60 05/31/2019    HGB 14 1 05/31/2019    HCT 42 0 05/31/2019    MCV 85 05/31/2019     05/31/2019     Lab Results   Component Value Date     04/09/2018    K 4 2 05/31/2019     05/31/2019    CO2 28 05/31/2019    ANIONGAP 10 1 04/09/2018    BUN 11 05/31/2019    CREATININE 0 82 05/31/2019    GLUF 231 (H) 05/31/2019    CALCIUM 9 5 05/31/2019    AST 12 (L) 05/31/2019    ALT 11 05/31/2019    ALKPHOS 60 05/31/2019    PROT 6 6 04/09/2018    BILITOT 0 9 04/09/2018    EGFR 73 05/31/2019     Lab Results   Component Value Date    CHOLESTEROL 90 05/31/2019    CHOLESTEROL 92 09/10/2018     Lab Results   Component Value Date    HDL 19 (L) 05/31/2019    HDL 23 (L) 09/10/2018    HDL 18 (L) 04/09/2018     Lab Results   Component Value Date    LDLCALC 43 05/31/2019    LDLCALC 50 09/10/2018    LDLCALC 56 8 (L) 04/09/2018     Lab Results   Component Value Date    TRIG 140 05/31/2019    TRIG 94 09/10/2018    TRIG 147 04/09/2018     No results found for: [place], [place]  Lab Results   Component Value Date    GII7GMJCFWZS 3 750 05/31/2019     Lab Results   Component Value Date    HGBA1C 11 3 (H) 05/31/2019     No results found for: ALISIA Camilo DO

## 2019-07-09 NOTE — PATIENT INSTRUCTIONS
Diabetes in the Older Adult   AMBULATORY CARE:   What you need to know if you are an older adult with diabetes:  Older adults with diabetes are at risk for heart disease, stroke, kidney disease, blindness, and nerve damage  You may also be at risk for any of the following:  · Poor nutrition or low blood sugar levels    · Confusion or problems with memory, attention, or learning new things    · Trouble controlling urination or frequent urinary tract infections    · Trouble with coordination or balance    · Falls and injuries    · Pain    · Depression    · Open sores on your legs or feet  The ABCs of diabetes: The ABCs stand for certain things you can do to manage or prevent problems caused by diabetes:  · A  stands for A1c test   This test shows the average amount of sugar in your blood over the past 2 to 3 months  High levels of sugar in your blood can cause damage to your heart, blood vessels, kidneys, feet, and eyes  Most older adults with diabetes should have an A1c level less than 7 5  Ask your healthcare provider if this A1c goal is right for you  Your provider can help you make changes if your A1c is too high  · B  stands for blood pressure   High blood pressure can increase your risk for a heart attack, stroke, or kidney disease  Most older adults with diabetes should have a systolic blood pressure (first number) of 140  Your diastolic blood pressure (second number) should be below 90  Ask your healthcare provider if these blood pressure goals are right for you  · C  stands for cholesterol   High levels of cholesterol can block your arteries and cause a heart attack or stroke  Ask your healthcare provider what your cholesterol levels should be  · S  stands for stop smoking   Nicotine and other chemicals in cigarettes and cigars can cause lung damage and make it more difficult to manage your diabetes    Call 911 if you have any of the following:   · You have any of the following signs of a stroke: ¨ Numbness or drooping on one side of your face     ¨ Weakness in an arm or leg    ¨ Confusion or difficulty speaking    ¨ Dizziness, a severe headache, or vision loss    · You have any of the following signs of a heart attack:      ¨ Squeezing, pressure, or pain in your chest that lasts longer than 5 minutes or returns    ¨ Discomfort or pain in your back, neck, jaw, stomach, or arm     ¨ Trouble breathing    ¨ Nausea or vomiting    ¨ Lightheadedness or a sudden cold sweat, especially with chest pain or trouble breathing  Seek care immediately if:   · You have severe abdominal pain, or the pain spreads to your back  You may also be vomiting  · You have trouble staying awake or focusing  · You are shaking or sweating  · You have blurred or double vision  · Your breath has a fruity, sweet smell  · Your breathing is deep and labored, or rapid and shallow  · Your heartbeat is fast and weak  · You fall and get hurt  Contact your healthcare provider if:   · You are vomiting or have diarrhea  · You have an upset stomach and cannot eat the foods on your meal plan  · You feel weak or more tired than usual      · You feel dizzy, have headaches, or are easily irritated  · Your skin is red, warm, dry, or swollen  · You have a wound that does not heal      · You have numbness in your arms or legs  · You have trouble coping with your illness, or you feel anxious or depressed  · You have problems with your memory  · You have changes in your vision  · You have questions or concerns about your condition or care  Treatment for diabetes  includes keeping your blood sugar at a normal level  You must eat the right foods, and exercise regularly  You may need medicine if you cannot control your blood sugar level with nutrition and exercise  You may also need medicine to lower your blood pressure or cholesterol, or medicine to prevent blood clots     Manage the ABCs and prevent problems caused by diabetes:   · Check your blood sugar levels as directed  Your healthcare provider will tell you when and how often to check during the day  Your healthcare provider will also tell you what your blood sugar levels should be before and after a meal  You may need to check for ketones in your urine or blood if your level is higher than directed  Write down your results and show them to your healthcare provider  Your provider may use the results to make changes to your medicine, food, or exercise schedules  Ask your healthcare provider for more information about how to treat a high or low blood sugar level  · Follow your meal plan as directed  A dietitian will help you make a meal plan to keep your blood sugar level steady and make sure you get enough nutrition  Do not skip meals  Your blood sugar level may drop too low if you have taken diabetes medicine and do not eat  Ask your healthcare provider about programs in your community that can deliver the meals to your home  · Try to be active for 30 to 60 minutes most days of the week  Exercise can help keep your blood sugar level steady, decrease your risk of heart disease, and help you lose weight  It can also help improve your balance and decrease your risk for falls  Work with your healthcare provider to create an exercise plan  Ask a family member or friend to exercise with you  Start slow and exercise for 5 to 10 minutes at a time  Examples of activities include walking or swimming  Include muscle strengthening activities 2 to 3 days each week  Include balance training 2 to 3 times each week  Activities that help increase balance include yoga and debbie chi      · Maintain a healthy weight  Ask your healthcare provider how much you should weigh  A healthy weight can help you control your diabetes and prevent heart disease  Ask your provider to help you create a weight loss plan if you are overweight   Together you can set manageable weight loss goals  · Do not smoke  Ask your healthcare provider for information if you currently smoke and need help to quit  Do not use e-cigarettes or smokeless tobacco in place of cigarettes or to help you quit  They still contain nicotine  · Manage stress  Stress may increase your blood sugar level  Deep breathing, muscle relaxation, and music may help you relax  Ask your healthcare provider for more information about these practices  Other ways to manage your diabetes:   · Check your feet every day for sores  Look at your whole foot, including the bottom, and between and under your toes  Check for wounds, corns, and calluses  Use a mirror to see the bottom of your feet  The skin on your feet may be shiny, tight, dry, or darker than normal  Your feet may also be cold and pale  Feel your feet by running your hands along the tops, bottoms, sides, and between your toes  Redness, swelling, and warmth are signs of blood flow problems that can lead to a foot ulcer  Do not try to remove corns or calluses yourself  · Wear medical alert identification  Wear medical alert jewelry or carry a card that says you have diabetes  Ask your healthcare provider where to get these items  · Ask about vaccines  You have a higher risk for serious illness if you get the flu, pneumonia, or hepatitis  Ask your healthcare provider if you should get a flu, pneumonia, shingles, or hepatitis B vaccine, and when to get the vaccine  · Keep all appointments  You may need to return to have your A1c checked every 3 months  You will need to return at least once each year to have your feet checked  You will need an eye exam once a year to check for retinopathy  You will also need urine tests every year to check for kidney problems  You may need tests to monitor for heart disease  Write down your questions so you remember to ask them during your visits  · Get help from family and friends    You may need help checking your blood sugar level, giving insulin injections, or preparing your meals  Ask your family and friends to help you with these tasks  Talk to your healthcare provider if you do not have someone at home to help you  A healthcare provider can come to your home to help you with these tasks  Follow up with your healthcare provider as directed: You may need to return to have your A1c checked every 3 months  You will need to return at least once each year to have your feet checked  You will need an eye exam once a year to check for retinopathy  You will also need urine tests every year to check for kidney problems  You may need tests to monitor for heart disease  Write down your questions so you remember to ask them during your visits  © 2017 [facility] Information is for End User's use only and may not be sold, redistributed or otherwise used for commercial purposes  All illustrations and images included in CareNotes® are the copyrighted property of [employer]  or Lazarus Lindsay  The above information is an  only  It is not intended as medical advice for individual conditions or treatments  Talk to your doctor, nurse or pharmacist before following any medical regimen to see if it is safe and effective for you

## 2019-07-09 NOTE — ASSESSMENT & PLAN NOTE
Lab Results   Component Value Date    HGBA1C 11 3 (H) 05/31/2019       No results for input(s): POCGLU in the last 72 hours      Blood Sugar Average: Last 72 hrs:   diabetes with poor control medications restarted after last office visit will re-evaluate with her upcoming scheduled office visit with lab work and A1c in the near future continue with current medications avoiding high fructose corn syrup and concentrated sweets watch diet closely and follow up at next office visit

## 2019-07-09 NOTE — ASSESSMENT & PLAN NOTE
Anxiety and depression doing better at this point continue with current medication renew this at this time and increase the dose in the future if necessary I will re-evaluate her at her next appointment

## 2019-07-09 NOTE — ASSESSMENT & PLAN NOTE
Essential hypertension controlled at this time on 5 mg lisinopril continue medication and follow up at next office visit

## 2019-07-09 NOTE — ASSESSMENT & PLAN NOTE
Perianal abscess post treatment through general surgery follow-up with Dr Cassidy Fleming in general surgery for further care

## 2019-09-14 DIAGNOSIS — I10 ESSENTIAL HYPERTENSION: ICD-10-CM

## 2019-09-16 RX ORDER — LISINOPRIL 5 MG/1
TABLET ORAL
Qty: 90 TABLET | Refills: 1 | Status: SHIPPED | OUTPATIENT
Start: 2019-09-16 | End: 2020-03-12

## 2019-10-16 ENCOUNTER — OFFICE VISIT (OUTPATIENT)
Dept: FAMILY MEDICINE CLINIC | Facility: CLINIC | Age: 69
End: 2019-10-16
Payer: COMMERCIAL

## 2019-10-16 VITALS
WEIGHT: 198.4 LBS | HEART RATE: 87 BPM | DIASTOLIC BLOOD PRESSURE: 82 MMHG | BODY MASS INDEX: 33.05 KG/M2 | OXYGEN SATURATION: 98 % | HEIGHT: 65 IN | SYSTOLIC BLOOD PRESSURE: 140 MMHG

## 2019-10-16 DIAGNOSIS — E78.2 MIXED HYPERLIPIDEMIA: ICD-10-CM

## 2019-10-16 DIAGNOSIS — I25.119 CORONARY ARTERY DISEASE WITH ANGINA PECTORIS, UNSPECIFIED VESSEL OR LESION TYPE, UNSPECIFIED WHETHER NATIVE OR TRANSPLANTED HEART (HCC): ICD-10-CM

## 2019-10-16 DIAGNOSIS — E11.9 DIABETIC EYE EXAM (HCC): ICD-10-CM

## 2019-10-16 DIAGNOSIS — I10 ESSENTIAL HYPERTENSION: ICD-10-CM

## 2019-10-16 DIAGNOSIS — E11.65 UNCONTROLLED TYPE 2 DIABETES MELLITUS WITH HYPERGLYCEMIA (HCC): Primary | ICD-10-CM

## 2019-10-16 DIAGNOSIS — Z01.00 DIABETIC EYE EXAM (HCC): ICD-10-CM

## 2019-10-16 DIAGNOSIS — Z11.59 NEED FOR HEPATITIS C SCREENING TEST: ICD-10-CM

## 2019-10-16 PROCEDURE — 99214 OFFICE O/P EST MOD 30 MIN: CPT | Performed by: FAMILY MEDICINE

## 2019-10-16 RX ORDER — ATORVASTATIN CALCIUM 40 MG/1
40 TABLET, FILM COATED ORAL DAILY
Qty: 90 TABLET | Refills: 1 | Status: SHIPPED | OUTPATIENT
Start: 2019-10-16 | End: 2020-04-20 | Stop reason: SDUPTHER

## 2019-10-16 RX ORDER — METFORMIN HYDROCHLORIDE 500 MG/1
500 TABLET, EXTENDED RELEASE ORAL 2 TIMES DAILY
Qty: 180 TABLET | Refills: 1 | Status: SHIPPED | OUTPATIENT
Start: 2019-10-16 | End: 2020-05-21

## 2019-10-16 NOTE — PATIENT INSTRUCTIONS
10% - bad control"> 10% - bad control,Hemoglobin A1c (HbA1c) greater than 10% indicating poor diabetic control,Haemoglobin A1c greater than 10% indicating poor diabetic control">   Diabetes Mellitus Type 2 in Adults, Ambulatory Care   GENERAL INFORMATION:   Diabetes mellitus type 2  is a disease that affects how your body uses glucose (sugar)  Insulin helps move sugar out of the blood so it can be used for energy  Normally, when the blood sugar level increases, the pancreas makes more insulin  Type 2 diabetes develops because either the body cannot make enough insulin, or it cannot use the insulin correctly  After many years, your pancreas may stop making insulin  Common symptoms include the following:   · More hunger or thirst than usual     · Frequent urination     · Weight loss without trying     · Blurred vision  Seek immediate care for the following symptoms:   · Severe abdominal pain, or pain that spreads to your back  You may also be vomiting  · Trouble staying awake or focusing    · Shaking or sweating    · Blurred or double vision    · Breath has a fruity, sweet smell    · Breathing is deep and labored, or rapid and shallow    · Heartbeat is fast and weak  Treatment for diabetes mellitus type 2  includes keeping your blood sugar at a normal level  You must eat the right foods, and exercise regularly  You may also need medicine if you cannot control your blood sugar level with nutrition and exercise  Manage diabetes mellitus type 2:   · Check your blood sugar level  You will be taught how to check a small drop of blood in a glucose monitor  Ask your healthcare provider when and how often to check during the day  Ask your healthcare provider what your blood sugar levels should be when you check them  · Keep track of carbohydrates (sugar and starchy foods)  Your blood sugar level can get too high if you eat too many carbohydrates   Your dietitian will help you plan meals and snacks that have the right amount of carbohydrates  · Eat low-fat foods  Some examples are skinless chicken and low-fat milk  · Eat less sodium (salt)  Some examples of high-sodium foods to limit are soy sauce, potato chips, and soup  Do not add salt to food you cook  Limit your use of table salt  · Eat high-fiber foods  Foods that are a good source of fiber include vegetables, whole grain bread, and beans  · Limit alcohol  Alcohol affects your blood sugar level and can make it harder to manage your diabetes  Women should limit alcohol to 1 drink a day  Men should limit alcohol to 2 drinks a day  A drink of alcohol is 12 ounces of beer, 5 ounces of wine, or 1½ ounces of liquor  · Get regular exercise  Exercise can help keep your blood sugar level steady, decrease your risk of heart disease, and help you lose weight  Exercise for at least 30 minutes, 5 days a week  Include muscle strengthening activities 2 days each week  Work with your healthcare provider to create an exercise plan  · Check your feet each day  for injuries or open sores  Ask your healthcare provider for activities you can do if you have an open sore  · Quit smoking  If you smoke, it is never too late to quit  Smoking can worsen the problems that may occur with diabetes  Ask your healthcare provider for information about how to stop smoking if you are having trouble quitting  · Ask about your weight:  Ask healthcare providers if you need to lose weight, and how much to lose  Ask them to help you with a weight loss program  Even a 10 to 15 pound weight loss can help you manage your blood sugar level  · Carry medical alert identification  Wear medical alert jewelry or carry a card that says you have diabetes  Ask your healthcare provider where to get these items  · Ask about vaccines  Diabetes puts you at risk of serious illness if you get the flu, pneumonia, or hepatitis   Ask your healthcare provider if you should get a flu, pneumonia, or hepatitis B vaccine, and when to get the vaccine  Follow up with your healthcare provider as directed:  Write down your questions so you remember to ask them during your visits  CARE AGREEMENT:   You have the right to help plan your care  Learn about your health condition and how it may be treated  Discuss treatment options with your caregivers to decide what care you want to receive  You always have the right to refuse treatment  The above information is an  only  It is not intended as medical advice for individual conditions or treatments  Talk to your doctor, nurse or pharmacist before following any medical regimen to see if it is safe and effective for you  © 2014 [address] is for End User's use only and may not be sold, redistributed or otherwise used for commercial purposes  All illustrations and images included in CareNotes® are the copyrighted property of [employer]  or Lazarus Lindsay

## 2019-10-16 NOTE — PROGRESS NOTES
Assessment/Plan:       Problem List Items Addressed This Visit        Endocrine    Uncontrolled type 2 diabetes mellitus with hyperglycemia ([facility] ) - Primary       Lab Results   Component Value Date    HGBA1C 11 3 (H) 05/31/2019    Uncontrolled diabetes patient does not check her numbers often and her last A1c was high at 11 3 I would like to repeat that now she was offered to have it done here at the office today but she refused and I will ask her to get a lab test before her next visit  Patient is trying to save on cost she has a lot of expenses with her current medical care and insurance plan at this time does not allow for different medications  She will renew her atorvastatin and her metformin  At this time she will start using Lantus 30 units twice daily check her blood glucose at home and contact me after a week this can be increased back to 40 units twice daily if needed         Relevant Medications    metFORMIN (GLUCOPHAGE-XR) 500 mg 24 hr tablet    Other Relevant Orders    POCT hemoglobin A1c    Comprehensive metabolic panel    Hemoglobin A1C    Lipid panel    TSH, 3rd generation with Free T4 reflex       Cardiovascular and Mediastinum    Essential hypertension     Patient has hypertension at this point control with current lisinopril 5mg follow-up at next office visit         Coronary artery disease with angina pectoris ([facility] )       Other    Mixed hyperlipidemia     Mixed hyperlipidemia continue with Lipitor 40 mg reorder today         Relevant Medications    atorvastatin (LIPITOR) 40 mg tablet      Other Visit Diagnoses     Need for hepatitis C screening test        Relevant Orders    Hepatitis C antibody    Diabetic eye exam [facility])        Relevant Orders    Ambulatory Referral to Ophthalmology            Subjective:      Patient ID: Pierre Louis is a 68 y o  female      Patient presents today for follow-up evaluation for diabetes she has not been checking her Accu-Cheks and did not go for laboratory work at this point  Her last A1c was high at 11 6 prior to that it was 9 6  She is questioning the use of Lantus insulin in place of her 79 /30 because of pricing she did receive samples from her sister when they did not need this medication after her brother-in-law had passed away  The following portions of the patient's history were reviewed and updated as appropriate: allergies, current medications, past family history, past medical history, past social history, past surgical history and problem list     Review of Systems   Constitutional: Negative for chills, fatigue and fever  HENT: Negative for congestion, nosebleeds, rhinorrhea, sinus pressure and sore throat  Eyes: Negative for discharge and redness  Respiratory: Negative for cough and shortness of breath  Cardiovascular: Negative for chest pain, palpitations and leg swelling  Gastrointestinal: Negative for abdominal pain, blood in stool and nausea  Endocrine: Negative for cold intolerance, heat intolerance and polyuria  Genitourinary: Negative for dysuria and frequency  Musculoskeletal: Negative for arthralgias, back pain and myalgias  Skin: Negative for rash  Neurological: Negative for dizziness, weakness and headaches  Hematological: Negative for adenopathy  Psychiatric/Behavioral: Negative for behavioral problems and sleep disturbance  The patient is not nervous/anxious  Objective:      /82 (BP Location: Left arm, Patient Position: Sitting)   Pulse 87   Ht 5' 5" (1 651 m)   Wt 90 kg (198 lb 6 4 oz)   SpO2 98%   BMI 33 02 kg/m²        Physical Exam   Constitutional: She is oriented to person, place, and time  She appears well-developed and well-nourished  No distress  HENT:   Head: Normocephalic and atraumatic  Right Ear: External ear normal    Left Ear: External ear normal    Nose: Nose normal    Mouth/Throat: Oropharynx is clear and moist  No oropharyngeal exudate     Eyes: Pupils are equal, round, and reactive to light  Conjunctivae and EOM are normal  Right eye exhibits no discharge  Left eye exhibits no discharge  No scleral icterus  Neck: Normal range of motion  No JVD present  No thyromegaly present  Cardiovascular: Normal rate, regular rhythm and normal heart sounds  No murmur heard  Pulmonary/Chest: Effort normal  She has no wheezes  She has no rales  She exhibits no tenderness  Abdominal: Soft  Bowel sounds are normal  She exhibits no distension and no mass  There is no tenderness  Musculoskeletal: Normal range of motion  She exhibits no edema, tenderness or deformity  Lymphadenopathy:     She has no cervical adenopathy  Neurological: She is alert and oriented to person, place, and time  She has normal reflexes  She displays normal reflexes  No cranial nerve deficit  Coordination normal    Skin: Skin is warm and dry  No rash noted  Psychiatric: She has a normal mood and affect  Her behavior is normal  Judgment and thought content normal    Nursing note and vitals reviewed  Data:    Laboratory Results: I have personally reviewed the pertinent laboratory results/reports   Radiology/Other Diagnostic Testing Results: I have personally reviewed pertinent reports         Lab Results   Component Value Date    WBC 5 60 05/31/2019    HGB 14 1 05/31/2019    HCT 42 0 05/31/2019    MCV 85 05/31/2019     05/31/2019     Lab Results   Component Value Date     04/09/2018    K 4 2 05/31/2019     05/31/2019    CO2 28 05/31/2019    ANIONGAP 10 1 04/09/2018    BUN 11 05/31/2019    CREATININE 0 82 05/31/2019    GLUF 231 (H) 05/31/2019    CALCIUM 9 5 05/31/2019    AST 12 (L) 05/31/2019    ALT 11 05/31/2019    ALKPHOS 60 05/31/2019    PROT 6 6 04/09/2018    BILITOT 0 9 04/09/2018    EGFR 73 05/31/2019     Lab Results   Component Value Date    CHOLESTEROL 90 05/31/2019    CHOLESTEROL 92 09/10/2018     Lab Results   Component Value Date    HDL 19 (L) 05/31/2019    HDL 23 (L) 09/10/2018 HDL 18 (L) 04/09/2018     Lab Results   Component Value Date    LDLCALC 43 05/31/2019    LDLCALC 50 09/10/2018    LDLCALC 56 8 (L) 04/09/2018     Lab Results   Component Value Date    TRIG 140 05/31/2019    TRIG 94 09/10/2018    TRIG 147 04/09/2018     No results found for: [place], [place]  Lab Results   Component Value Date    [id number] 3 750 05/31/2019     Lab Results   Component Value Date    HGBA1C 11 3 (H) 05/31/2019     No results found for: ALISIA Bell, DO

## 2019-10-16 NOTE — ASSESSMENT & PLAN NOTE
Patient has hypertension at this point control with current lisinopril 5mg follow-up at next office visit

## 2019-10-16 NOTE — ASSESSMENT & PLAN NOTE
Lab Results   Component Value Date    HGBA1C 11 3 (H) 05/31/2019    Uncontrolled diabetes patient does not check her numbers often and her last A1c was high at 11 3 I would like to repeat that now she was offered to have it done here at the office today but she refused and I will ask her to get a lab test before her next visit  Patient is trying to save on cost she has a lot of expenses with her current medical care and insurance plan at this time does not allow for different medications  She will renew her atorvastatin and her metformin    At this time she will start using Lantus 30 units twice daily check her blood glucose at home and contact me after a week this can be increased back to 40 units twice daily if needed

## 2019-10-28 ENCOUNTER — OFFICE VISIT (OUTPATIENT)
Dept: FAMILY MEDICINE CLINIC | Facility: CLINIC | Age: 69
End: 2019-10-28
Payer: COMMERCIAL

## 2019-10-28 VITALS
OXYGEN SATURATION: 98 % | WEIGHT: 198 LBS | SYSTOLIC BLOOD PRESSURE: 120 MMHG | DIASTOLIC BLOOD PRESSURE: 72 MMHG | HEIGHT: 65 IN | HEART RATE: 73 BPM | BODY MASS INDEX: 32.99 KG/M2

## 2019-10-28 DIAGNOSIS — L03.032 PARONYCHIA OF GREAT TOE OF LEFT FOOT: Primary | ICD-10-CM

## 2019-10-28 PROCEDURE — 3008F BODY MASS INDEX DOCD: CPT | Performed by: FAMILY MEDICINE

## 2019-10-28 PROCEDURE — 99213 OFFICE O/P EST LOW 20 MIN: CPT | Performed by: FAMILY MEDICINE

## 2019-10-28 RX ORDER — CEPHALEXIN 500 MG/1
1000 CAPSULE ORAL EVERY 12 HOURS SCHEDULED
Qty: 28 CAPSULE | Refills: 0 | Status: SHIPPED | OUTPATIENT
Start: 2019-10-28 | End: 2019-11-04

## 2019-10-28 NOTE — PROGRESS NOTES
Assessment/Plan:       Problem List Items Addressed This Visit        Musculoskeletal and Integument    Paronychia of great toe of left foot - Primary     Infection great toe left foot at nailbed opened area with 18 gauge needle with purulent drainage expressed and bandage then applied  Patient will start on Keflex 500 mg 2 capsules twice daily over the next 5-7 days and return here if not improved she will soak the foot with hot water in Epson salt for 30 minutes every day         Relevant Medications    cephalexin (KEFLEX) 500 mg capsule            Subjective:      Patient ID: Zuri Bell is a 68 y o  female  Patient presents today for infection and pain in the left foot large toe that is been present over the last 2 days she denies trauma to the area and cannot think of how it occurred and feels that maybe she was bitten by a spider      The following portions of the patient's history were reviewed and updated as appropriate: allergies, current medications, past family history, past medical history, past social history, past surgical history and problem list     Review of Systems   Constitutional: Negative for chills, fatigue and fever  HENT: Negative for congestion, nosebleeds, rhinorrhea, sinus pressure and sore throat  Eyes: Negative for discharge and redness  Respiratory: Negative for cough and shortness of breath  Cardiovascular: Negative for chest pain, palpitations and leg swelling  Gastrointestinal: Negative for abdominal pain, blood in stool and nausea  Endocrine: Negative for cold intolerance, heat intolerance and polyuria  Genitourinary: Negative for dysuria and frequency  Musculoskeletal: Negative for arthralgias, back pain and myalgias  Skin: Negative for rash  Neurological: Negative for dizziness, weakness and headaches  Hematological: Negative for adenopathy  Psychiatric/Behavioral: Negative for behavioral problems and sleep disturbance   The patient is not nervous/anxious  Objective:      /72 (BP Location: Left arm, Patient Position: Sitting)   Pulse 73   Ht 5' 5" (1 651 m)   Wt 89 8 kg (198 lb)   SpO2 98%   BMI 32 95 kg/m²        Physical Exam   Constitutional: She is oriented to person, place, and time  She appears well-developed and well-nourished  No distress  HENT:   Head: Normocephalic and atraumatic  Right Ear: External ear normal    Left Ear: External ear normal    Nose: Nose normal    Mouth/Throat: Oropharynx is clear and moist  No oropharyngeal exudate  Eyes: Pupils are equal, round, and reactive to light  Conjunctivae and EOM are normal  Right eye exhibits no discharge  Left eye exhibits no discharge  No scleral icterus  Neck: Normal range of motion  No JVD present  No thyromegaly present  Cardiovascular: Normal rate, regular rhythm and normal heart sounds  No murmur heard  Pulmonary/Chest: Effort normal  She has no wheezes  She has no rales  She exhibits no tenderness  Abdominal: Soft  Bowel sounds are normal  She exhibits no distension and no mass  There is no tenderness  Musculoskeletal: Normal range of motion  She exhibits no edema, tenderness or deformity  Lymphadenopathy:     She has no cervical adenopathy  Neurological: She is alert and oriented to person, place, and time  She has normal reflexes  She displays normal reflexes  No cranial nerve deficit  Coordination normal    Skin: Skin is warm and dry  No rash noted  Infection with loculated area of white purulent drainage at nailbed cuticle left great toe  Inflammation and erythema and pain over the nail bed and base of the nail bed left great toe   Psychiatric: She has a normal mood and affect  Her behavior is normal  Judgment and thought content normal    Nursing note and vitals reviewed         Data:    Laboratory Results:   Radiology/Other Diagnostic Testing Results:     Lab Results   Component Value Date    WBC 5 60 05/31/2019    HGB 14 1 05/31/2019 HCT 42 0 05/31/2019    MCV 85 05/31/2019     05/31/2019     Lab Results   Component Value Date     04/09/2018    K 4 2 05/31/2019     05/31/2019    CO2 28 05/31/2019    ANIONGAP 10 1 04/09/2018    BUN 11 05/31/2019    CREATININE 0 82 05/31/2019    GLUF 231 (H) 05/31/2019    CALCIUM 9 5 05/31/2019    AST 12 (L) 05/31/2019    ALT 11 05/31/2019    ALKPHOS 60 05/31/2019    PROT 6 6 04/09/2018    BILITOT 0 9 04/09/2018    EGFR 73 05/31/2019     Lab Results   Component Value Date    CHOLESTEROL 90 05/31/2019    CHOLESTEROL 92 09/10/2018     Lab Results   Component Value Date    HDL 19 (L) 05/31/2019    HDL 23 (L) 09/10/2018    HDL 18 (L) 04/09/2018     Lab Results   Component Value Date    LDLCALC 43 05/31/2019    LDLCALC 50 09/10/2018    LDLCALC 56 8 (L) 04/09/2018     Lab Results   Component Value Date    TRIG 140 05/31/2019    TRIG 94 09/10/2018    TRIG 147 04/09/2018     No results found for: [place], [place]  Lab Results   Component Value Date    [id number] 3 750 05/31/2019     Lab Results   Component Value Date    HGBA1C 11 3 (H) 05/31/2019     No results found for: ALISIA    [facility]

## 2019-10-28 NOTE — ASSESSMENT & PLAN NOTE
Infection great toe left foot at nailbed opened area with 18 gauge needle with purulent drainage expressed and bandage then applied    Patient will start on Keflex 500 mg 2 capsules twice daily over the next 5-7 days and return here if not improved she will soak the foot with hot water in Epson salt for 30 minutes every day

## 2020-01-17 ENCOUNTER — TELEPHONE (OUTPATIENT)
Dept: OTHER | Facility: OTHER | Age: 70
End: 2020-01-17

## 2020-03-12 DIAGNOSIS — I10 ESSENTIAL HYPERTENSION: ICD-10-CM

## 2020-03-12 RX ORDER — LISINOPRIL 5 MG/1
TABLET ORAL
Qty: 90 TABLET | Refills: 0 | Status: SHIPPED | OUTPATIENT
Start: 2020-03-12 | End: 2020-06-12

## 2020-03-12 NOTE — TELEPHONE ENCOUNTER
Patient is due for her annual Medicare wellness evaluation June 4th set this up today she will have a three-month prescription given for her thyroid medicine and she should have blood work done prior to that appointment coming up in June

## 2020-04-20 DIAGNOSIS — E78.2 MIXED HYPERLIPIDEMIA: ICD-10-CM

## 2020-04-20 RX ORDER — ATORVASTATIN CALCIUM 40 MG/1
40 TABLET, FILM COATED ORAL DAILY
Qty: 90 TABLET | Refills: 1 | Status: SHIPPED | OUTPATIENT
Start: 2020-04-20 | End: 2020-10-13 | Stop reason: SDUPTHER

## 2020-05-21 DIAGNOSIS — E11.65 UNCONTROLLED TYPE 2 DIABETES MELLITUS WITH HYPERGLYCEMIA (HCC): ICD-10-CM

## 2020-05-21 RX ORDER — METFORMIN HYDROCHLORIDE 500 MG/1
TABLET, EXTENDED RELEASE ORAL
Qty: 180 TABLET | Refills: 0 | Status: SHIPPED | OUTPATIENT
Start: 2020-05-21 | End: 2020-07-30 | Stop reason: SDUPTHER

## 2020-06-12 ENCOUNTER — HOSPITAL ENCOUNTER (EMERGENCY)
Facility: HOSPITAL | Age: 70
Discharge: HOME | End: 2020-06-12
Attending: EMERGENCY MEDICINE | Admitting: INTERNAL MEDICINE
Payer: COMMERCIAL

## 2020-06-12 ENCOUNTER — APPOINTMENT (EMERGENCY)
Dept: CT IMAGING | Facility: HOSPITAL | Age: 70
End: 2020-06-12
Payer: COMMERCIAL

## 2020-06-12 ENCOUNTER — APPOINTMENT (EMERGENCY)
Dept: RADIOLOGY | Facility: HOSPITAL | Age: 70
End: 2020-06-12
Payer: COMMERCIAL

## 2020-06-12 VITALS
WEIGHT: 205 LBS | OXYGEN SATURATION: 97 % | BODY MASS INDEX: 34.16 KG/M2 | TEMPERATURE: 97.2 F | HEIGHT: 65 IN | SYSTOLIC BLOOD PRESSURE: 142 MMHG | DIASTOLIC BLOOD PRESSURE: 72 MMHG | RESPIRATION RATE: 18 BRPM | HEART RATE: 82 BPM

## 2020-06-12 DIAGNOSIS — I10 ESSENTIAL HYPERTENSION: ICD-10-CM

## 2020-06-12 DIAGNOSIS — S42.209A PROXIMAL HUMERUS FRACTURE: Primary | ICD-10-CM

## 2020-06-12 LAB
APTT PPP: 29 SECONDS (ref 23–37)
BASOPHILS # BLD AUTO: 0 THOUSANDS/ΜL (ref 0–0.1)
BASOPHILS NFR BLD AUTO: 1 % (ref 0–2)
EOSINOPHIL # BLD AUTO: 0.2 THOUSAND/ΜL (ref 0–0.61)
EOSINOPHIL NFR BLD AUTO: 4 % (ref 0–5)
ERYTHROCYTE [DISTWIDTH] IN BLOOD BY AUTOMATED COUNT: 14.5 % (ref 11.5–14.5)
GLUCOSE SERPL-MCNC: 365 MG/DL (ref 65–140)
HCT VFR BLD AUTO: 41.1 % (ref 42–47)
HGB BLD-MCNC: 13.8 G/DL (ref 12–16)
INR PPP: 1.13 (ref 0.84–1.19)
LYMPHOCYTES # BLD AUTO: 1.2 THOUSANDS/ΜL (ref 0.6–4.47)
LYMPHOCYTES NFR BLD AUTO: 21 % (ref 21–51)
MCH RBC QN AUTO: 28.4 PG (ref 26–34)
MCHC RBC AUTO-ENTMCNC: 33.5 G/DL (ref 31–37)
MCV RBC AUTO: 85 FL (ref 81–99)
MONOCYTES # BLD AUTO: 0.3 THOUSAND/ΜL (ref 0.17–1.22)
MONOCYTES NFR BLD AUTO: 5 % (ref 2–12)
NEUTROPHILS # BLD AUTO: 4.1 THOUSANDS/ΜL (ref 1.4–6.5)
NEUTS SEG NFR BLD AUTO: 70 % (ref 42–75)
PLATELET # BLD AUTO: 293 THOUSANDS/UL (ref 149–390)
PMV BLD AUTO: 7.4 FL (ref 8.6–11.7)
PROTHROMBIN TIME: 14 SECONDS (ref 11.6–14.5)
RBC # BLD AUTO: 4.85 MILLION/UL (ref 3.9–5.2)
WBC # BLD AUTO: 5.8 THOUSAND/UL (ref 4.8–10.8)

## 2020-06-12 PROCEDURE — 36415 COLL VENOUS BLD VENIPUNCTURE: CPT | Performed by: PHYSICIAN ASSISTANT

## 2020-06-12 PROCEDURE — 99285 EMERGENCY DEPT VISIT HI MDM: CPT

## 2020-06-12 PROCEDURE — 96375 TX/PRO/DX INJ NEW DRUG ADDON: CPT

## 2020-06-12 PROCEDURE — 85730 THROMBOPLASTIN TIME PARTIAL: CPT | Performed by: PHYSICIAN ASSISTANT

## 2020-06-12 PROCEDURE — 73030 X-RAY EXAM OF SHOULDER: CPT

## 2020-06-12 PROCEDURE — 85025 COMPLETE CBC W/AUTO DIFF WBC: CPT | Performed by: PHYSICIAN ASSISTANT

## 2020-06-12 PROCEDURE — 73060 X-RAY EXAM OF HUMERUS: CPT

## 2020-06-12 PROCEDURE — 73200 CT UPPER EXTREMITY W/O DYE: CPT

## 2020-06-12 PROCEDURE — 70450 CT HEAD/BRAIN W/O DYE: CPT

## 2020-06-12 PROCEDURE — 73564 X-RAY EXAM KNEE 4 OR MORE: CPT

## 2020-06-12 PROCEDURE — 70486 CT MAXILLOFACIAL W/O DYE: CPT

## 2020-06-12 PROCEDURE — 99284 EMERGENCY DEPT VISIT MOD MDM: CPT | Performed by: PHYSICIAN ASSISTANT

## 2020-06-12 PROCEDURE — 82948 REAGENT STRIP/BLOOD GLUCOSE: CPT

## 2020-06-12 PROCEDURE — 96374 THER/PROPH/DIAG INJ IV PUSH: CPT

## 2020-06-12 PROCEDURE — 85610 PROTHROMBIN TIME: CPT | Performed by: PHYSICIAN ASSISTANT

## 2020-06-12 RX ORDER — FENTANYL CITRATE 50 UG/ML
50 INJECTION, SOLUTION INTRAMUSCULAR; INTRAVENOUS ONCE
Status: COMPLETED | OUTPATIENT
Start: 2020-06-12 | End: 2020-06-12

## 2020-06-12 RX ORDER — MORPHINE SULFATE 15 MG/1
15 TABLET ORAL EVERY 6 HOURS PRN
Qty: 13 TABLET | Refills: 0 | Status: SHIPPED | OUTPATIENT
Start: 2020-06-12 | End: 2020-06-13 | Stop reason: SDUPTHER

## 2020-06-12 RX ORDER — LISINOPRIL 5 MG/1
TABLET ORAL
Qty: 90 TABLET | Refills: 0 | Status: SHIPPED | OUTPATIENT
Start: 2020-06-12 | End: 2020-06-20 | Stop reason: HOSPADM

## 2020-06-12 RX ORDER — ONDANSETRON 2 MG/ML
4 INJECTION INTRAMUSCULAR; INTRAVENOUS ONCE
Status: COMPLETED | OUTPATIENT
Start: 2020-06-12 | End: 2020-06-12

## 2020-06-12 RX ORDER — MORPHINE SULFATE 4 MG/ML
4 INJECTION, SOLUTION INTRAMUSCULAR; INTRAVENOUS ONCE
Status: DISCONTINUED | OUTPATIENT
Start: 2020-06-12 | End: 2020-06-12

## 2020-06-12 RX ORDER — MORPHINE SULFATE 4 MG/ML
4 INJECTION, SOLUTION INTRAMUSCULAR; INTRAVENOUS ONCE
Status: COMPLETED | OUTPATIENT
Start: 2020-06-12 | End: 2020-06-12

## 2020-06-12 RX ADMIN — ONDANSETRON 4 MG: 2 INJECTION INTRAMUSCULAR; INTRAVENOUS at 20:33

## 2020-06-12 RX ADMIN — FENTANYL CITRATE 50 MCG: 50 INJECTION INTRAMUSCULAR; INTRAVENOUS at 20:36

## 2020-06-12 RX ADMIN — MORPHINE SULFATE 4 MG: 4 INJECTION INTRAVENOUS at 18:33

## 2020-06-13 RX ORDER — MORPHINE SULFATE 15 MG/1
15 TABLET ORAL 3 TIMES DAILY
Qty: 13 TABLET | Refills: 0 | Status: SHIPPED | OUTPATIENT
Start: 2020-06-13 | End: 2020-06-20 | Stop reason: HOSPADM

## 2020-06-15 ENCOUNTER — TELEPHONE (OUTPATIENT)
Dept: RHEUMATOLOGY | Facility: CLINIC | Age: 70
End: 2020-06-15

## 2020-06-17 ENCOUNTER — OFFICE VISIT (OUTPATIENT)
Dept: OBGYN CLINIC | Facility: CLINIC | Age: 70
End: 2020-06-17
Payer: COMMERCIAL

## 2020-06-17 ENCOUNTER — TELEPHONE (OUTPATIENT)
Dept: FAMILY MEDICINE CLINIC | Facility: CLINIC | Age: 70
End: 2020-06-17

## 2020-06-17 ENCOUNTER — TELEPHONE (OUTPATIENT)
Dept: OTHER | Facility: OTHER | Age: 70
End: 2020-06-17

## 2020-06-17 ENCOUNTER — APPOINTMENT (OUTPATIENT)
Dept: LAB | Facility: MEDICAL CENTER | Age: 70
End: 2020-06-17
Payer: COMMERCIAL

## 2020-06-17 VITALS
SYSTOLIC BLOOD PRESSURE: 160 MMHG | WEIGHT: 205 LBS | BODY MASS INDEX: 34.11 KG/M2 | DIASTOLIC BLOOD PRESSURE: 87 MMHG | TEMPERATURE: 98.1 F | HEART RATE: 101 BPM

## 2020-06-17 DIAGNOSIS — Z11.59 NEED FOR HEPATITIS C SCREENING TEST: ICD-10-CM

## 2020-06-17 DIAGNOSIS — S42.291A CLOSED 3-PART FRACTURE OF PROXIMAL END OF RIGHT HUMERUS, INITIAL ENCOUNTER: Primary | ICD-10-CM

## 2020-06-17 DIAGNOSIS — S42.291A CLOSED 3-PART FRACTURE OF PROXIMAL END OF RIGHT HUMERUS, INITIAL ENCOUNTER: ICD-10-CM

## 2020-06-17 DIAGNOSIS — E11.65 UNCONTROLLED TYPE 2 DIABETES MELLITUS WITH HYPERGLYCEMIA (HCC): ICD-10-CM

## 2020-06-17 LAB
ALBUMIN SERPL BCP-MCNC: 3.3 G/DL (ref 3.5–5)
ALP SERPL-CCNC: 164 U/L (ref 46–116)
ALT SERPL W P-5'-P-CCNC: 21 U/L (ref 12–78)
ANION GAP SERPL CALCULATED.3IONS-SCNC: 9 MMOL/L (ref 4–13)
AST SERPL W P-5'-P-CCNC: 13 U/L (ref 5–45)
BILIRUB SERPL-MCNC: 0.99 MG/DL (ref 0.2–1)
BUN SERPL-MCNC: 62 MG/DL (ref 5–25)
CALCIUM SERPL-MCNC: 8.9 MG/DL (ref 8.3–10.1)
CHLORIDE SERPL-SCNC: 93 MMOL/L (ref 100–108)
CHOLEST SERPL-MCNC: 94 MG/DL (ref 50–200)
CO2 SERPL-SCNC: 22 MMOL/L (ref 21–32)
CREAT SERPL-MCNC: 1.59 MG/DL (ref 0.6–1.3)
EST. AVERAGE GLUCOSE BLD GHB EST-MCNC: 326 MG/DL
GFR SERPL CREATININE-BSD FRML MDRD: 32 ML/MIN/1.73SQ M
GLUCOSE P FAST SERPL-MCNC: 769 MG/DL (ref 65–99)
HBA1C MFR BLD: 13 %
HCV AB SER QL: NORMAL
HDLC SERPL-MCNC: 14 MG/DL
LDLC SERPL CALC-MCNC: 41 MG/DL (ref 0–100)
NONHDLC SERPL-MCNC: 80 MG/DL
POTASSIUM SERPL-SCNC: 5 MMOL/L (ref 3.5–5.3)
PROT SERPL-MCNC: 7.6 G/DL (ref 6.4–8.2)
SODIUM SERPL-SCNC: 124 MMOL/L (ref 136–145)
TRIGL SERPL-MCNC: 196 MG/DL
TSH SERPL DL<=0.05 MIU/L-ACNC: 0.7 UIU/ML (ref 0.36–3.74)

## 2020-06-17 PROCEDURE — 80061 LIPID PANEL: CPT

## 2020-06-17 PROCEDURE — 1160F RVW MEDS BY RX/DR IN RCRD: CPT | Performed by: ORTHOPAEDIC SURGERY

## 2020-06-17 PROCEDURE — 83036 HEMOGLOBIN GLYCOSYLATED A1C: CPT

## 2020-06-17 PROCEDURE — 86803 HEPATITIS C AB TEST: CPT

## 2020-06-17 PROCEDURE — 84443 ASSAY THYROID STIM HORMONE: CPT

## 2020-06-17 PROCEDURE — 36415 COLL VENOUS BLD VENIPUNCTURE: CPT

## 2020-06-17 PROCEDURE — 80053 COMPREHEN METABOLIC PANEL: CPT

## 2020-06-17 PROCEDURE — 99203 OFFICE O/P NEW LOW 30 MIN: CPT | Performed by: ORTHOPAEDIC SURGERY

## 2020-06-17 PROCEDURE — 3077F SYST BP >= 140 MM HG: CPT | Performed by: ORTHOPAEDIC SURGERY

## 2020-06-17 PROCEDURE — 3046F HEMOGLOBIN A1C LEVEL >9.0%: CPT | Performed by: ORTHOPAEDIC SURGERY

## 2020-06-17 PROCEDURE — 3079F DIAST BP 80-89 MM HG: CPT | Performed by: ORTHOPAEDIC SURGERY

## 2020-06-17 PROCEDURE — 1036F TOBACCO NON-USER: CPT | Performed by: ORTHOPAEDIC SURGERY

## 2020-06-18 ENCOUNTER — APPOINTMENT (EMERGENCY)
Dept: RADIOLOGY | Facility: HOSPITAL | Age: 70
DRG: 639 | End: 2020-06-18
Payer: COMMERCIAL

## 2020-06-18 ENCOUNTER — TELEPHONE (OUTPATIENT)
Dept: FAMILY MEDICINE CLINIC | Facility: CLINIC | Age: 70
End: 2020-06-18

## 2020-06-18 ENCOUNTER — HOSPITAL ENCOUNTER (INPATIENT)
Facility: HOSPITAL | Age: 70
LOS: 2 days | Discharge: HOME | DRG: 639 | End: 2020-06-20
Attending: FAMILY MEDICINE | Admitting: INTERNAL MEDICINE
Payer: COMMERCIAL

## 2020-06-18 ENCOUNTER — TELEPHONE (OUTPATIENT)
Dept: OBGYN CLINIC | Facility: CLINIC | Age: 70
End: 2020-06-18

## 2020-06-18 DIAGNOSIS — E87.1 HYPONATREMIA: Primary | ICD-10-CM

## 2020-06-18 DIAGNOSIS — E11.10 DIABETIC KETOACIDOSIS WITHOUT COMA ASSOCIATED WITH TYPE 2 DIABETES MELLITUS (HCC): ICD-10-CM

## 2020-06-18 DIAGNOSIS — E11.65 UNCONTROLLED TYPE 2 DIABETES MELLITUS WITH HYPERGLYCEMIA (HCC): ICD-10-CM

## 2020-06-18 DIAGNOSIS — S42.309A HUMERAL FRACTURE: ICD-10-CM
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
## 2020-06-18 DIAGNOSIS — E11.65 HYPERGLYCEMIA DUE TO DIABETES MELLITUS (HCC): ICD-10-CM

## 2020-06-18 DIAGNOSIS — I10 ESSENTIAL HYPERTENSION: ICD-10-CM

## 2020-06-18 PROBLEM — S42.301D FRACTURE OF RIGHT HUMERUS WITH ROUTINE HEALING: Status: ACTIVE | Noted: 2020-06-18

## 2020-06-18 PROBLEM — Z91.81 HISTORY OF FALL: Status: ACTIVE | Noted: 2020-06-18

## 2020-06-18 LAB
ANION GAP SERPL CALCULATED.3IONS-SCNC: 11 MMOL/L (ref 4–13)
ANION GAP SERPL CALCULATED.3IONS-SCNC: 17 MMOL/L (ref 4–13)
ANION GAP SERPL CALCULATED.3IONS-SCNC: 6 MMOL/L (ref 4–13)
BASOPHILS # BLD AUTO: 0 THOUSANDS/ΜL (ref 0–0.1)
BASOPHILS NFR BLD AUTO: 0 % (ref 0–2)
BETA-HYDROXYBUTYRATE: 5.7 MMOL/L
BUN SERPL-MCNC: 42 MG/DL (ref 7–25)
BUN SERPL-MCNC: 46 MG/DL (ref 7–25)
BUN SERPL-MCNC: 50 MG/DL (ref 7–25)
CALCIUM SERPL-MCNC: 8.5 MG/DL (ref 8.6–10.5)
CALCIUM SERPL-MCNC: 8.7 MG/DL (ref 8.6–10.5)
CALCIUM SERPL-MCNC: 9.1 MG/DL (ref 8.6–10.5)
CHLORIDE SERPL-SCNC: 102 MMOL/L (ref 98–107)
CHLORIDE SERPL-SCNC: 105 MMOL/L (ref 98–107)
CHLORIDE SERPL-SCNC: 95 MMOL/L (ref 98–107)
CO2 SERPL-SCNC: 19 MMOL/L (ref 21–31)
CO2 SERPL-SCNC: 22 MMOL/L (ref 21–31)
CO2 SERPL-SCNC: 25 MMOL/L (ref 21–31)
CREAT SERPL-MCNC: 0.96 MG/DL (ref 0.6–1.2)
CREAT SERPL-MCNC: 1.07 MG/DL (ref 0.6–1.2)
CREAT SERPL-MCNC: 1.24 MG/DL (ref 0.6–1.2)
EOSINOPHIL # BLD AUTO: 0 THOUSAND/ΜL (ref 0–0.61)
EOSINOPHIL NFR BLD AUTO: 0 % (ref 0–5)
ERYTHROCYTE [DISTWIDTH] IN BLOOD BY AUTOMATED COUNT: 14.3 % (ref 11.5–14.5)
GFR SERPL CREATININE-BSD FRML MDRD: 44 ML/MIN/1.73SQ M
GFR SERPL CREATININE-BSD FRML MDRD: 52 ML/MIN/1.73SQ M
GFR SERPL CREATININE-BSD FRML MDRD: 60 ML/MIN/1.73SQ M
GLUCOSE SERPL-MCNC: 111 MG/DL (ref 65–140)
GLUCOSE SERPL-MCNC: 133 MG/DL (ref 65–140)
GLUCOSE SERPL-MCNC: 137 MG/DL (ref 65–140)
GLUCOSE SERPL-MCNC: 169 MG/DL (ref 65–140)
GLUCOSE SERPL-MCNC: 178 MG/DL (ref 65–99)
GLUCOSE SERPL-MCNC: 209 MG/DL (ref 65–140)
GLUCOSE SERPL-MCNC: 213 MG/DL (ref 65–140)
GLUCOSE SERPL-MCNC: 325 MG/DL (ref 65–140)
GLUCOSE SERPL-MCNC: 325 MG/DL (ref 65–99)
GLUCOSE SERPL-MCNC: 341 MG/DL (ref 65–140)
GLUCOSE SERPL-MCNC: 374 MG/DL (ref 65–140)
GLUCOSE SERPL-MCNC: 491 MG/DL (ref 65–140)
GLUCOSE SERPL-MCNC: 522 MG/DL (ref 65–99)
GLUCOSE SERPL-MCNC: 84 MG/DL (ref 65–140)
HCT VFR BLD AUTO: 36.3 % (ref 42–47)
HGB BLD-MCNC: 11.5 G/DL (ref 12–16)
LYMPHOCYTES # BLD AUTO: 1.1 THOUSANDS/ΜL (ref 0.6–4.47)
LYMPHOCYTES NFR BLD AUTO: 13 % (ref 21–51)
MAGNESIUM SERPL-MCNC: 2 MG/DL (ref 1.9–2.7)
MAGNESIUM SERPL-MCNC: 2 MG/DL (ref 1.9–2.7)
MCH RBC QN AUTO: 27.3 PG (ref 26–34)
MCHC RBC AUTO-ENTMCNC: 31.7 G/DL (ref 31–37)
MCV RBC AUTO: 86 FL (ref 81–99)
MONOCYTES # BLD AUTO: 0.5 THOUSAND/ΜL (ref 0.17–1.22)
MONOCYTES NFR BLD AUTO: 6 % (ref 2–12)
NEUTROPHILS # BLD AUTO: 6.6 THOUSANDS/ΜL (ref 1.4–6.5)
NEUTS SEG NFR BLD AUTO: 81 % (ref 42–75)
PHOSPHATE SERPL-MCNC: 2.1 MG/DL (ref 3–5.5)
PHOSPHATE SERPL-MCNC: 2.3 MG/DL (ref 3–5.5)
PLATELET # BLD AUTO: 333 THOUSANDS/UL (ref 149–390)
PMV BLD AUTO: 7.6 FL (ref 8.6–11.7)
POTASSIUM SERPL-SCNC: 3.7 MMOL/L (ref 3.5–5.5)
POTASSIUM SERPL-SCNC: 4 MMOL/L (ref 3.5–5.5)
POTASSIUM SERPL-SCNC: 5.3 MMOL/L (ref 3.5–5.5)
RBC # BLD AUTO: 4.21 MILLION/UL (ref 3.9–5.2)
SODIUM SERPL-SCNC: 131 MMOL/L (ref 134–143)
SODIUM SERPL-SCNC: 135 MMOL/L (ref 134–143)
SODIUM SERPL-SCNC: 136 MMOL/L (ref 134–143)
WBC # BLD AUTO: 8.1 THOUSAND/UL (ref 4.8–10.8)

## 2020-06-18 PROCEDURE — 99285 EMERGENCY DEPT VISIT HI MDM: CPT

## 2020-06-18 PROCEDURE — 71045 X-RAY EXAM CHEST 1 VIEW: CPT

## 2020-06-18 PROCEDURE — 96360 HYDRATION IV INFUSION INIT: CPT

## 2020-06-18 PROCEDURE — 36415 COLL VENOUS BLD VENIPUNCTURE: CPT | Performed by: FAMILY MEDICINE

## 2020-06-18 PROCEDURE — 96372 THER/PROPH/DIAG INJ SC/IM: CPT

## 2020-06-18 PROCEDURE — 82010 KETONE BODYS QUAN: CPT | Performed by: FAMILY MEDICINE

## 2020-06-18 PROCEDURE — 99285 EMERGENCY DEPT VISIT HI MDM: CPT | Performed by: FAMILY MEDICINE

## 2020-06-18 PROCEDURE — 83735 ASSAY OF MAGNESIUM: CPT | Performed by: INTERNAL MEDICINE

## 2020-06-18 PROCEDURE — 85025 COMPLETE CBC W/AUTO DIFF WBC: CPT | Performed by: FAMILY MEDICINE

## 2020-06-18 PROCEDURE — 82948 REAGENT STRIP/BLOOD GLUCOSE: CPT

## 2020-06-18 PROCEDURE — 84100 ASSAY OF PHOSPHORUS: CPT | Performed by: INTERNAL MEDICINE

## 2020-06-18 PROCEDURE — 80048 BASIC METABOLIC PNL TOTAL CA: CPT | Performed by: FAMILY MEDICINE

## 2020-06-18 PROCEDURE — 99223 1ST HOSP IP/OBS HIGH 75: CPT | Performed by: INTERNAL MEDICINE

## 2020-06-18 PROCEDURE — 80048 BASIC METABOLIC PNL TOTAL CA: CPT | Performed by: INTERNAL MEDICINE

## 2020-06-18 RX ORDER — SODIUM CHLORIDE 9 MG/ML
500 INJECTION, SOLUTION INTRAVENOUS CONTINUOUS
Status: DISPENSED | OUTPATIENT
Start: 2020-06-18 | End: 2020-06-18

## 2020-06-18 RX ORDER — HYDRALAZINE HYDROCHLORIDE 20 MG/ML
5 INJECTION INTRAMUSCULAR; INTRAVENOUS EVERY 6 HOURS PRN
Status: DISCONTINUED | OUTPATIENT
Start: 2020-06-18 | End: 2020-06-20 | Stop reason: HOSPADM

## 2020-06-18 RX ORDER — SODIUM CHLORIDE 9 MG/ML
2000 INJECTION, SOLUTION INTRAVENOUS CONTINUOUS
Status: DISPENSED | OUTPATIENT
Start: 2020-06-18 | End: 2020-06-18

## 2020-06-18 RX ORDER — ACETAMINOPHEN 325 MG/1
650 TABLET ORAL EVERY 6 HOURS PRN
Status: DISCONTINUED | OUTPATIENT
Start: 2020-06-18 | End: 2020-06-19

## 2020-06-18 RX ORDER — HEPARIN SODIUM 5000 [USP'U]/ML
5000 INJECTION, SOLUTION INTRAVENOUS; SUBCUTANEOUS EVERY 12 HOURS SCHEDULED
Status: DISCONTINUED | OUTPATIENT
Start: 2020-06-18 | End: 2020-06-20 | Stop reason: HOSPADM

## 2020-06-18 RX ORDER — ATORVASTATIN CALCIUM 40 MG/1
40 TABLET, FILM COATED ORAL DAILY
Status: DISCONTINUED | OUTPATIENT
Start: 2020-06-18 | End: 2020-06-20 | Stop reason: HOSPADM

## 2020-06-18 RX ORDER — POTASSIUM CHLORIDE 20 MEQ/1
40 TABLET, EXTENDED RELEASE ORAL ONCE
Status: COMPLETED | OUTPATIENT
Start: 2020-06-18 | End: 2020-06-18

## 2020-06-18 RX ORDER — SODIUM CHLORIDE 9 MG/ML
250 INJECTION, SOLUTION INTRAVENOUS CONTINUOUS
Status: DISCONTINUED | OUTPATIENT
Start: 2020-06-18 | End: 2020-06-19

## 2020-06-18 RX ORDER — INSULIN GLARGINE 100 [IU]/ML
5 INJECTION, SOLUTION SUBCUTANEOUS
Status: DISCONTINUED | OUTPATIENT
Start: 2020-06-19 | End: 2020-06-20 | Stop reason: HOSPADM

## 2020-06-18 RX ORDER — SODIUM CHLORIDE 9 MG/ML
500 INJECTION, SOLUTION INTRAVENOUS CONTINUOUS
Status: DISCONTINUED | OUTPATIENT
Start: 2020-06-18 | End: 2020-06-18

## 2020-06-18 RX ORDER — SODIUM CHLORIDE 9 MG/ML
2000 INJECTION, SOLUTION INTRAVENOUS CONTINUOUS
Status: DISCONTINUED | OUTPATIENT
Start: 2020-06-18 | End: 2020-06-18

## 2020-06-18 RX ORDER — DEXTROSE AND SODIUM CHLORIDE 5; .9 G/100ML; G/100ML
250 INJECTION, SOLUTION INTRAVENOUS CONTINUOUS
Status: DISCONTINUED | OUTPATIENT
Start: 2020-06-18 | End: 2020-06-18

## 2020-06-18 RX ORDER — SODIUM CHLORIDE 9 MG/ML
250 INJECTION, SOLUTION INTRAVENOUS CONTINUOUS
Status: DISCONTINUED | OUTPATIENT
Start: 2020-06-18 | End: 2020-06-18

## 2020-06-18 RX ORDER — POTASSIUM CHLORIDE 20 MEQ/1
20 TABLET, EXTENDED RELEASE ORAL ONCE
Status: COMPLETED | OUTPATIENT
Start: 2020-06-18 | End: 2020-06-18

## 2020-06-18 RX ORDER — DEXTROSE AND SODIUM CHLORIDE 5; .9 G/100ML; G/100ML
250 INJECTION, SOLUTION INTRAVENOUS CONTINUOUS
Status: DISCONTINUED | OUTPATIENT
Start: 2020-06-18 | End: 2020-06-19

## 2020-06-18 RX ADMIN — HYDRALAZINE HYDROCHLORIDE 5 MG: 20 INJECTION INTRAMUSCULAR; INTRAVENOUS at 20:59

## 2020-06-18 RX ADMIN — MAGNESIUM GLUCONATE 500 MG ORAL TABLET 800 MG: 500 TABLET ORAL at 18:29

## 2020-06-18 RX ADMIN — SODIUM CHLORIDE 125 ML/HR: 0.9 INJECTION, SOLUTION INTRAVENOUS at 15:54

## 2020-06-18 RX ADMIN — INSULIN HUMAN 15 UNITS: 100 INJECTION, SOLUTION PARENTERAL at 12:04

## 2020-06-18 RX ADMIN — SODIUM CHLORIDE 1000 ML: 0.9 INJECTION, SOLUTION INTRAVENOUS at 12:04

## 2020-06-18 RX ADMIN — DEXTROSE AND SODIUM CHLORIDE 250 ML/HR: 5; 900 INJECTION, SOLUTION INTRAVENOUS at 21:56

## 2020-06-18 RX ADMIN — POTASSIUM CHLORIDE 40 MEQ: 1500 TABLET, EXTENDED RELEASE ORAL at 21:00

## 2020-06-18 RX ADMIN — POTASSIUM CHLORIDE 20 MEQ: 1500 TABLET, EXTENDED RELEASE ORAL at 18:29

## 2020-06-18 RX ADMIN — INSULIN HUMAN 10 UNITS: 100 INJECTION, SOLUTION PARENTERAL at 13:08

## 2020-06-18 RX ADMIN — SODIUM PHOSPHATE, MONOBASIC, MONOHYDRATE 12 MMOL: 276; 142 INJECTION, SOLUTION INTRAVENOUS at 18:30

## 2020-06-18 RX ADMIN — ACETAMINOPHEN 650 MG: 325 TABLET ORAL at 16:25

## 2020-06-18 RX ADMIN — SERTRALINE HYDROCHLORIDE 25 MG: 50 TABLET ORAL at 16:25

## 2020-06-18 RX ADMIN — DEXTROSE AND SODIUM CHLORIDE 250 ML/HR: 5; 900 INJECTION, SOLUTION INTRAVENOUS at 17:29

## 2020-06-18 RX ADMIN — SODIUM CHLORIDE 1000 ML: 0.9 INJECTION, SOLUTION INTRAVENOUS at 12:56

## 2020-06-18 RX ADMIN — ATORVASTATIN CALCIUM 40 MG: 40 TABLET, FILM COATED ORAL at 16:25

## 2020-06-18 RX ADMIN — Medication 9.5 UNITS/HR: at 15:55

## 2020-06-18 RX ADMIN — SODIUM CHLORIDE 0.1 UNITS/HR: 9 INJECTION, SOLUTION INTRAVENOUS at 14:33

## 2020-06-18 RX ADMIN — HEPARIN SODIUM 5000 UNITS: 5000 INJECTION INTRAVENOUS; SUBCUTANEOUS at 21:00

## 2020-06-18 RX ADMIN — DEXTROSE AND SODIUM CHLORIDE 250 ML/HR: 5; 900 INJECTION, SOLUTION INTRAVENOUS at 14:34

## 2020-06-19 LAB
ANION GAP SERPL CALCULATED.3IONS-SCNC: 8 MMOL/L (ref 4–13)
ANION GAP SERPL CALCULATED.3IONS-SCNC: 9 MMOL/L (ref 4–13)
BASOPHILS # BLD AUTO: 0 THOUSANDS/ΜL (ref 0–0.1)
BASOPHILS NFR BLD AUTO: 0 % (ref 0–2)
BETA-HYDROXYBUTYRATE: 3.2 MMOL/L
BUN SERPL-MCNC: 30 MG/DL (ref 7–25)
BUN SERPL-MCNC: 34 MG/DL (ref 7–25)
CALCIUM SERPL-MCNC: 7.8 MG/DL (ref 8.6–10.5)
CALCIUM SERPL-MCNC: 8.4 MG/DL (ref 8.6–10.5)
CHLORIDE SERPL-SCNC: 107 MMOL/L (ref 98–107)
CHLORIDE SERPL-SCNC: 108 MMOL/L (ref 98–107)
CO2 SERPL-SCNC: 21 MMOL/L (ref 21–31)
CO2 SERPL-SCNC: 24 MMOL/L (ref 21–31)
CREAT SERPL-MCNC: 0.88 MG/DL (ref 0.6–1.2)
CREAT SERPL-MCNC: 0.94 MG/DL (ref 0.6–1.2)
EOSINOPHIL # BLD AUTO: 0.1 THOUSAND/ΜL (ref 0–0.61)
EOSINOPHIL NFR BLD AUTO: 1 % (ref 0–5)
ERYTHROCYTE [DISTWIDTH] IN BLOOD BY AUTOMATED COUNT: 14.5 % (ref 11.5–14.5)
GFR SERPL CREATININE-BSD FRML MDRD: 61 ML/MIN/1.73SQ M
GFR SERPL CREATININE-BSD FRML MDRD: 66 ML/MIN/1.73SQ M
GLUCOSE SERPL-MCNC: 291 MG/DL (ref 65–99)
GLUCOSE SERPL-MCNC: 292 MG/DL (ref 65–140)
GLUCOSE SERPL-MCNC: 296 MG/DL (ref 65–140)
GLUCOSE SERPL-MCNC: 304 MG/DL (ref 65–140)
GLUCOSE SERPL-MCNC: 328 MG/DL (ref 65–140)
GLUCOSE SERPL-MCNC: 349 MG/DL (ref 65–140)
GLUCOSE SERPL-MCNC: 88 MG/DL (ref 65–99)
HCT VFR BLD AUTO: 31.1 % (ref 42–47)
HGB BLD-MCNC: 10.4 G/DL (ref 12–16)
LYMPHOCYTES # BLD AUTO: 1.3 THOUSANDS/ΜL (ref 0.6–4.47)
LYMPHOCYTES NFR BLD AUTO: 20 % (ref 21–51)
MAGNESIUM SERPL-MCNC: 2 MG/DL (ref 1.9–2.7)
MCH RBC QN AUTO: 28 PG (ref 26–34)
MCHC RBC AUTO-ENTMCNC: 33.6 G/DL (ref 31–37)
MCV RBC AUTO: 84 FL (ref 81–99)
MONOCYTES # BLD AUTO: 0.4 THOUSAND/ΜL (ref 0.17–1.22)
MONOCYTES NFR BLD AUTO: 7 % (ref 2–12)
NEUTROPHILS # BLD AUTO: 4.7 THOUSANDS/ΜL (ref 1.4–6.5)
NEUTS SEG NFR BLD AUTO: 72 % (ref 42–75)
PHOSPHATE SERPL-MCNC: 2.2 MG/DL (ref 3–5.5)
PLATELET # BLD AUTO: 307 THOUSANDS/UL (ref 149–390)
PMV BLD AUTO: 7.1 FL (ref 8.6–11.7)
POTASSIUM SERPL-SCNC: 4.2 MMOL/L (ref 3.5–5.5)
POTASSIUM SERPL-SCNC: 4.8 MMOL/L (ref 3.5–5.5)
RBC # BLD AUTO: 3.73 MILLION/UL (ref 3.9–5.2)
SODIUM SERPL-SCNC: 137 MMOL/L (ref 134–143)
SODIUM SERPL-SCNC: 140 MMOL/L (ref 134–143)
WBC # BLD AUTO: 6.5 THOUSAND/UL (ref 4.8–10.8)

## 2020-06-19 PROCEDURE — 82010 KETONE BODYS QUAN: CPT | Performed by: NURSE PRACTITIONER

## 2020-06-19 PROCEDURE — 99232 SBSQ HOSP IP/OBS MODERATE 35: CPT | Performed by: INTERNAL MEDICINE

## 2020-06-19 PROCEDURE — 80048 BASIC METABOLIC PNL TOTAL CA: CPT | Performed by: PHYSICIAN ASSISTANT

## 2020-06-19 PROCEDURE — 97167 OT EVAL HIGH COMPLEX 60 MIN: CPT

## 2020-06-19 PROCEDURE — 85025 COMPLETE CBC W/AUTO DIFF WBC: CPT | Performed by: INTERNAL MEDICINE

## 2020-06-19 PROCEDURE — 82948 REAGENT STRIP/BLOOD GLUCOSE: CPT

## 2020-06-19 PROCEDURE — 97163 PT EVAL HIGH COMPLEX 45 MIN: CPT

## 2020-06-19 RX ORDER — INSULIN GLARGINE 100 [IU]/ML
20 INJECTION, SOLUTION SUBCUTANEOUS ONCE
Status: COMPLETED | OUTPATIENT
Start: 2020-06-19 | End: 2020-06-19

## 2020-06-19 RX ORDER — CLONIDINE HYDROCHLORIDE 0.1 MG/1
0.1 TABLET ORAL
Status: DISCONTINUED | OUTPATIENT
Start: 2020-06-19 | End: 2020-06-20 | Stop reason: HOSPADM

## 2020-06-19 RX ORDER — LISINOPRIL 20 MG/1
20 TABLET ORAL DAILY
Status: DISCONTINUED | OUTPATIENT
Start: 2020-06-19 | End: 2020-06-20 | Stop reason: HOSPADM

## 2020-06-19 RX ORDER — ACETAMINOPHEN 325 MG/1
650 TABLET ORAL EVERY 4 HOURS PRN
Status: DISCONTINUED | OUTPATIENT
Start: 2020-06-19 | End: 2020-06-20 | Stop reason: HOSPADM

## 2020-06-19 RX ORDER — HYDROCODONE BITARTRATE AND ACETAMINOPHEN 5; 325 MG/1; MG/1
1 TABLET ORAL EVERY 6 HOURS PRN
Status: DISCONTINUED | OUTPATIENT
Start: 2020-06-19 | End: 2020-06-20 | Stop reason: HOSPADM

## 2020-06-19 RX ADMIN — INSULIN LISPRO 5 UNITS: 100 INJECTION, SOLUTION INTRAVENOUS; SUBCUTANEOUS at 11:48

## 2020-06-19 RX ADMIN — HYDROCODONE BITARTRATE AND ACETAMINOPHEN 1 TABLET: 5; 325 TABLET ORAL at 06:20

## 2020-06-19 RX ADMIN — ATORVASTATIN CALCIUM 40 MG: 40 TABLET, FILM COATED ORAL at 08:52

## 2020-06-19 RX ADMIN — ACETAMINOPHEN 650 MG: 325 TABLET ORAL at 00:09

## 2020-06-19 RX ADMIN — HYDRALAZINE HYDROCHLORIDE 5 MG: 20 INJECTION INTRAMUSCULAR; INTRAVENOUS at 19:59

## 2020-06-19 RX ADMIN — INSULIN GLARGINE 20 UNITS: 100 INJECTION, SOLUTION SUBCUTANEOUS at 08:52

## 2020-06-19 RX ADMIN — HEPARIN SODIUM 5000 UNITS: 5000 INJECTION INTRAVENOUS; SUBCUTANEOUS at 21:34

## 2020-06-19 RX ADMIN — INSULIN GLARGINE 5 UNITS: 100 INJECTION, SOLUTION SUBCUTANEOUS at 00:17

## 2020-06-19 RX ADMIN — HYDRALAZINE HYDROCHLORIDE 5 MG: 20 INJECTION INTRAMUSCULAR; INTRAVENOUS at 13:49

## 2020-06-19 RX ADMIN — SERTRALINE HYDROCHLORIDE 25 MG: 50 TABLET ORAL at 08:52

## 2020-06-19 RX ADMIN — HEPARIN SODIUM 5000 UNITS: 5000 INJECTION INTRAVENOUS; SUBCUTANEOUS at 08:52

## 2020-06-19 RX ADMIN — LISINOPRIL 20 MG: 20 TABLET ORAL at 08:52

## 2020-06-19 RX ADMIN — INSULIN LISPRO 4 UNITS: 100 INJECTION, SOLUTION INTRAVENOUS; SUBCUTANEOUS at 16:37

## 2020-06-19 RX ADMIN — INSULIN LISPRO 5 UNITS: 100 INJECTION, SOLUTION INTRAVENOUS; SUBCUTANEOUS at 21:41

## 2020-06-19 RX ADMIN — INSULIN GLARGINE 5 UNITS: 100 INJECTION, SOLUTION SUBCUTANEOUS at 21:38

## 2020-06-19 RX ADMIN — INSULIN LISPRO 4 UNITS: 100 INJECTION, SOLUTION INTRAVENOUS; SUBCUTANEOUS at 07:35

## 2020-06-19 RX ADMIN — ACETAMINOPHEN 650 MG: 325 TABLET ORAL at 18:23

## 2020-06-19 RX ADMIN — ACETAMINOPHEN 650 MG: 325 TABLET ORAL at 04:04

## 2020-06-20 VITALS
HEART RATE: 86 BPM | RESPIRATION RATE: 14 BRPM | OXYGEN SATURATION: 98 % | HEIGHT: 65 IN | BODY MASS INDEX: 34.16 KG/M2 | SYSTOLIC BLOOD PRESSURE: 128 MMHG | TEMPERATURE: 98 F | WEIGHT: 205.03 LBS | DIASTOLIC BLOOD PRESSURE: 60 MMHG

## 2020-06-20 LAB
ANION GAP SERPL CALCULATED.3IONS-SCNC: 8 MMOL/L (ref 4–13)
BUN SERPL-MCNC: 21 MG/DL (ref 7–25)
CALCIUM SERPL-MCNC: 8.2 MG/DL (ref 8.6–10.5)
CHLORIDE SERPL-SCNC: 106 MMOL/L (ref 98–107)
CO2 SERPL-SCNC: 24 MMOL/L (ref 21–31)
CREAT SERPL-MCNC: 0.76 MG/DL (ref 0.6–1.2)
EOSINOPHIL # BLD AUTO: 0.18 THOUSAND/UL (ref 0–0.61)
EOSINOPHIL NFR BLD MANUAL: 4 % (ref 0–6)
ERYTHROCYTE [DISTWIDTH] IN BLOOD BY AUTOMATED COUNT: 13.9 % (ref 11.5–14.5)
GFR SERPL CREATININE-BSD FRML MDRD: 79 ML/MIN/1.73SQ M
GLUCOSE SERPL-MCNC: 224 MG/DL (ref 65–140)
GLUCOSE SERPL-MCNC: 261 MG/DL (ref 65–140)
GLUCOSE SERPL-MCNC: 261 MG/DL (ref 65–99)
HCT VFR BLD AUTO: 31 % (ref 42–47)
HGB BLD-MCNC: 10.6 G/DL (ref 12–16)
LYMPHOCYTES # BLD AUTO: 1.08 THOUSAND/UL (ref 0.6–4.47)
LYMPHOCYTES # BLD AUTO: 24 % (ref 20–51)
MCH RBC QN AUTO: 28.2 PG (ref 26–34)
MCHC RBC AUTO-ENTMCNC: 34 G/DL (ref 31–37)
MCV RBC AUTO: 83 FL (ref 81–99)
MONOCYTES # BLD AUTO: 0.27 THOUSAND/UL (ref 0–1.22)
MONOCYTES NFR BLD AUTO: 6 % (ref 4–12)
NEUTS SEG # BLD: 2.97 THOUSAND/UL (ref 1.81–6.82)
NEUTS SEG NFR BLD AUTO: 66 % (ref 42–75)
PLATELET # BLD AUTO: 286 THOUSANDS/UL (ref 149–390)
PLATELET BLD QL SMEAR: ADEQUATE
PMV BLD AUTO: 6.9 FL (ref 8.6–11.7)
POTASSIUM SERPL-SCNC: 4.2 MMOL/L (ref 3.5–5.5)
RBC # BLD AUTO: 3.74 MILLION/UL (ref 3.9–5.2)
RBC MORPH BLD: NORMAL
SODIUM SERPL-SCNC: 138 MMOL/L (ref 134–143)
TOTAL CELLS COUNTED SPEC: 100
WBC # BLD AUTO: 4.5 THOUSAND/UL (ref 4.8–10.8)

## 2020-06-20 PROCEDURE — 85027 COMPLETE CBC AUTOMATED: CPT | Performed by: INTERNAL MEDICINE

## 2020-06-20 PROCEDURE — 99239 HOSP IP/OBS DSCHRG MGMT >30: CPT | Performed by: INTERNAL MEDICINE

## 2020-06-20 PROCEDURE — 80048 BASIC METABOLIC PNL TOTAL CA: CPT | Performed by: INTERNAL MEDICINE

## 2020-06-20 PROCEDURE — 82948 REAGENT STRIP/BLOOD GLUCOSE: CPT

## 2020-06-20 PROCEDURE — 85007 BL SMEAR W/DIFF WBC COUNT: CPT | Performed by: INTERNAL MEDICINE

## 2020-06-20 RX ORDER — INSULIN GLARGINE 100 [IU]/ML
20 INJECTION, SOLUTION SUBCUTANEOUS ONCE
Status: COMPLETED | OUTPATIENT
Start: 2020-06-20 | End: 2020-06-20

## 2020-06-20 RX ORDER — AMLODIPINE BESYLATE 5 MG/1
5 TABLET ORAL DAILY
Qty: 30 TABLET | Refills: 0 | Status: SHIPPED | OUTPATIENT
Start: 2020-06-21 | End: 2020-07-14 | Stop reason: SDUPTHER

## 2020-06-20 RX ORDER — AMLODIPINE BESYLATE 5 MG/1
5 TABLET ORAL DAILY
Status: DISCONTINUED | OUTPATIENT
Start: 2020-06-20 | End: 2020-06-20 | Stop reason: HOSPADM

## 2020-06-20 RX ORDER — LISINOPRIL 20 MG/1
20 TABLET ORAL DAILY
Qty: 30 TABLET | Refills: 0 | Status: SHIPPED | OUTPATIENT
Start: 2020-06-21 | End: 2021-01-14

## 2020-06-20 RX ADMIN — ATORVASTATIN CALCIUM 40 MG: 40 TABLET, FILM COATED ORAL at 08:17

## 2020-06-20 RX ADMIN — INSULIN LISPRO 5 UNITS: 100 INJECTION, SOLUTION INTRAVENOUS; SUBCUTANEOUS at 11:42

## 2020-06-20 RX ADMIN — CLONIDINE HYDROCHLORIDE 0.1 MG: 0.1 TABLET ORAL at 00:04

## 2020-06-20 RX ADMIN — INSULIN GLARGINE 20 UNITS: 100 INJECTION, SOLUTION SUBCUTANEOUS at 08:54

## 2020-06-20 RX ADMIN — AMLODIPINE BESYLATE 5 MG: 5 TABLET ORAL at 08:17

## 2020-06-20 RX ADMIN — INSULIN LISPRO 5 UNITS: 100 INJECTION, SOLUTION INTRAVENOUS; SUBCUTANEOUS at 08:17

## 2020-06-20 RX ADMIN — SERTRALINE HYDROCHLORIDE 25 MG: 50 TABLET ORAL at 08:18

## 2020-06-20 RX ADMIN — INSULIN LISPRO 2 UNITS: 100 INJECTION, SOLUTION INTRAVENOUS; SUBCUTANEOUS at 07:56

## 2020-06-20 RX ADMIN — LISINOPRIL 20 MG: 20 TABLET ORAL at 08:18

## 2020-06-20 RX ADMIN — INSULIN LISPRO 3 UNITS: 100 INJECTION, SOLUTION INTRAVENOUS; SUBCUTANEOUS at 11:41

## 2020-06-21 PROCEDURE — 4010F ACE/ARB THERAPY RXD/TAKEN: CPT | Performed by: ORTHOPAEDIC SURGERY

## 2020-06-24 ENCOUNTER — OFFICE VISIT (OUTPATIENT)
Dept: OBGYN CLINIC | Facility: CLINIC | Age: 70
End: 2020-06-24

## 2020-06-24 ENCOUNTER — APPOINTMENT (OUTPATIENT)
Dept: RADIOLOGY | Facility: MEDICAL CENTER | Age: 70
End: 2020-06-24
Payer: COMMERCIAL

## 2020-06-24 VITALS
HEIGHT: 65 IN | BODY MASS INDEX: 34.16 KG/M2 | DIASTOLIC BLOOD PRESSURE: 77 MMHG | TEMPERATURE: 97.4 F | WEIGHT: 205 LBS | SYSTOLIC BLOOD PRESSURE: 145 MMHG | HEART RATE: 75 BPM

## 2020-06-24 DIAGNOSIS — S42.291D CLOSED 3-PART FRACTURE OF PROXIMAL END OF RIGHT HUMERUS WITH ROUTINE HEALING, SUBSEQUENT ENCOUNTER: ICD-10-CM

## 2020-06-24 DIAGNOSIS — S42.291D CLOSED 3-PART FRACTURE OF PROXIMAL END OF RIGHT HUMERUS WITH ROUTINE HEALING, SUBSEQUENT ENCOUNTER: Primary | ICD-10-CM

## 2020-06-24 PROCEDURE — 3008F BODY MASS INDEX DOCD: CPT | Performed by: ORTHOPAEDIC SURGERY

## 2020-06-24 PROCEDURE — 99024 POSTOP FOLLOW-UP VISIT: CPT | Performed by: ORTHOPAEDIC SURGERY

## 2020-06-24 PROCEDURE — 3077F SYST BP >= 140 MM HG: CPT | Performed by: ORTHOPAEDIC SURGERY

## 2020-06-24 PROCEDURE — 73030 X-RAY EXAM OF SHOULDER: CPT

## 2020-06-24 PROCEDURE — 1111F DSCHRG MED/CURRENT MED MERGE: CPT | Performed by: ORTHOPAEDIC SURGERY

## 2020-06-24 PROCEDURE — 1160F RVW MEDS BY RX/DR IN RCRD: CPT | Performed by: ORTHOPAEDIC SURGERY

## 2020-06-24 PROCEDURE — 3078F DIAST BP <80 MM HG: CPT | Performed by: ORTHOPAEDIC SURGERY

## 2020-06-24 RX ORDER — B-COMPLEX WITH VITAMIN C
1 TABLET ORAL
Qty: 90 TABLET | Refills: 4 | Status: SHIPPED | OUTPATIENT
Start: 2020-06-24 | End: 2021-01-14

## 2020-07-02 ENCOUNTER — EVALUATION (OUTPATIENT)
Dept: PHYSICAL THERAPY | Facility: CLINIC | Age: 70
End: 2020-07-02
Payer: COMMERCIAL

## 2020-07-02 DIAGNOSIS — S42.291D: Primary | ICD-10-CM

## 2020-07-02 PROCEDURE — 97162 PT EVAL MOD COMPLEX 30 MIN: CPT | Performed by: PHYSICAL THERAPIST

## 2020-07-02 PROCEDURE — 97110 THERAPEUTIC EXERCISES: CPT | Performed by: PHYSICAL THERAPIST

## 2020-07-02 NOTE — PROGRESS NOTES
PT Evaluation     Today's date: 2020  Patient name: Seth Escudero  : 1950  MRN: [id number]  Referring provider: Carolyne Whitehead MD  Dx:   Encounter Diagnosis     ICD-10-CM    1  Closed 3-part fracture of proximal end of right humerus, with routine healing, subsequent encounter S42 291D        Start Time: 1700  Stop Time: 1800  Total time in clinic (min): 60 minutes    Assessment  Assessment details: Seth Escudero was seen for an initial PT evaluation today  Patient is a 68 y o  female with diagnosis of proximal fracture of humerus and past medical history significant for DM, appendectomy, bilateral eye surgery, right wrist surgery, HTN  Moderate complexity evaluation  due to number of participation restrictions, functional outcome measure of 81% limitation, and changing clinical presentation  Findings today show limitation in right UE range of motion, strength, stability, and flexibility with pain impacting her ability to complete ADLs independently  Skilled PT indicated to treat at this time to address right upper extremity range of motion and muscle activation progressing as tolerated to return patient to PLOF  Impairments: abnormal muscle firing, abnormal or restricted ROM, impaired physical strength, lacks appropriate home exercise program, pain with function, scapular dyskinesis and poor posture     Goals  STG (6 weeks)  1  Patient's right shoulder flexion AROM will increase to 90 with 2/10 pain for increased ability to perform overhead ADLs  2  Patient will have 0/10 pain in right UE at rest   3  Patient's right shoulder strength will increase to 3/5 for increased ability to lift  LTG (12 weeks)  1  Patient's UE AROM equal bilaterally for ability to complete hair hygiene, overhead, and behind the back ADLs  2  Patient's UE strength will be equal bilaterally for ability to lift and carry at PLOF     3  Patient will be independent with home exercise program for continued maintenance post PT discharge  Plan  Plan details: Progress note in 4 weeks  Patient opted to be seen 1x/week due to high co-pay instructed her and daughter on need to follow through with HEP  Patient would benefit from: skilled physical therapy  Planned modality interventions: cryotherapy, unattended electrical stimulation and thermotherapy: hydrocollator packs  Planned therapy interventions: manual therapy, neuromuscular re-education, therapeutic activities, therapeutic exercise and home exercise program  Frequency: 2-3x/ week  Duration in weeks: 12  Plan of Care beginning date: 2020  Plan of Care expiration date: 10/2/2020  Treatment plan discussed with: patient and PTA        Subjective Evaluation    History of Present Illness  Date of onset: 2020  Mechanism of injury: Truman Tovar is a 68 y o  female who presents to outpatient Physical Therapy today with complaints of right arm pain  Patient states she was descending stairs to basement when she miss the last step falling injuring arm, knees, and head  Was driven to the hospital where she was diagnosed with right humeral fx  Saw ortho who took x-rays stating arm was broken in 3 places and to begin PT  Was also given a sling  To f/u in 2 weeks  Ortho instruction: Pendulum exercises for the shoulder  Exercises for the elbow and hand  Nonweightbearing for the next 4 weeks    X-ray IMPRESSION:     Acute comminuted fracture of the proximal right humerus  Was recently in hospital for DM on an insulin drip  Pain  Current pain ratin  At best pain ratin  At worst pain ratin  Location: rgight elbow and proximal arm   Quality: sharp  Relieving factors: relaxation and rest    Social Support  Lives with: adult children    Employment status: not working  Hand dominance: right      Diagnostic Tests  X-ray: abnormal  Patient Goals  Patient goals for therapy: decreased pain and increased motion  Patient goal: "Get it moving"           Objective Cervical/Thoracic Screen   Cervical range of motion within normal limits    Neurological Testing     Sensation     Shoulder   Left Shoulder   Intact: light touch    Right Shoulder   Intact: light touch    Active Range of Motion   Left Shoulder   Flexion: 167 degrees   Abduction: 145 degrees   External rotation BTH: T5   Internal rotation BTB: T6     Additional Active Range of Motion Details  Right AROM NT    Passive Range of Motion     Right Shoulder   Flexion: 45 degrees with pain  Abduction: 60 degrees with pain  External rotation 0°: 25 degrees with pain  Internal rotation 0°: Right shoulder passive internal rotation at 0 degrees: to belly   with pain    Right Elbow   Flexion: WFL  Extension: -44 degrees with pain    Strength/Myotome Testing     Left Shoulder   Normal muscle strength    Left Wrist/Hand      (2nd hand position)     Trial 1: 36    Trial 2: 34    Trial 3: 35    Right Wrist/Hand      (2nd hand position)     Trial 1: 19    Trial 2: 18    Trial 3: 16    Additional Strength Details  R UE strength NT    General Comments:      Shoulder Comments       FOTO: 19% function, 54% predicted function         Flowsheet Rows      Most Recent Value   PT/OT G-Codes   Current Score  19   Projected Score  54             Precautions: balance dysfunction, history of falls  Progress note: 8/2  POC: 10/2    Manuals 7/2       Right shoulder and elbow PROM                                Neuro Re-Ed                                                                Ther Ex        pulleys        PROM elbow flexion 5"x10       Table slides        peldulums 10x ea       Wrist AROM 10x ea       scap retraction 10x                                                                                       Ther Activity                        Gait Training                        Modalities                          Tamara Weber was given a handout and verbal instruction of customized home exercise program  Patient accepted program and was able to demonstrate exercises

## 2020-07-09 ENCOUNTER — OFFICE VISIT (OUTPATIENT)
Dept: PHYSICAL THERAPY | Facility: CLINIC | Age: 70
End: 2020-07-09
Payer: COMMERCIAL

## 2020-07-09 DIAGNOSIS — S42.291D: Primary | ICD-10-CM

## 2020-07-09 PROCEDURE — 97110 THERAPEUTIC EXERCISES: CPT | Performed by: PHYSICAL THERAPIST

## 2020-07-09 PROCEDURE — 97140 MANUAL THERAPY 1/> REGIONS: CPT | Performed by: PHYSICAL THERAPIST

## 2020-07-09 PROCEDURE — 97530 THERAPEUTIC ACTIVITIES: CPT | Performed by: PHYSICAL THERAPIST

## 2020-07-09 NOTE — PROGRESS NOTES
Daily Note     Today's date: 2020  Patient name: Seth Escudero  : 1950  MRN: [id number]  Referring provider: Carolyne Whitehead MD  Dx:   Encounter Diagnosis     ICD-10-CM    1  Closed 3-part fracture of proximal end of right humerus, with routine healing, subsequent encounter S42 291D        Start Time: 6066  Stop Time: 1615  Total time in clinic (min): 45 minutes    Subjective: Patient states she has been consistent with home program since last visit  Decreased overall pain in elbow reported  Has been walking around home without sling letting arm relax down  Objective: See treatment diary below      Assessment: Tolerated treatment well  Increased range of motion of shoulder and elbow noted today  Should progress gradually with passive mobility progressing as tolerated  Patient would benefit from continued PT      Plan: Continue per plan of care  Progress treatment as tolerated         Precautions: balance dysfunction, history of falls  Progress note:   POC: 10/2    Manuals       Right shoulder, elbow, wrist PROM  KB                              Neuro Re-Ed                                                                Ther Ex        pulleys        PROM elbow flexion 5"x10 10"x10      Table slides  Flexion 5"x10      peldulums 10x ea 10x ea      Wrist AROM 10x ea 1# 3x10ea  0# supination/pronation, flex      scap retraction 10x 10x      PROM supine forward elevation  Pain; guarding                                                                              Ther Activity           Yellow 30x      Grooved pegboard  6x      webbing  Finger flex, yellow 30x      Gait Training                        Modalities

## 2020-07-14 ENCOUNTER — OFFICE VISIT (OUTPATIENT)
Dept: FAMILY MEDICINE CLINIC | Facility: CLINIC | Age: 70
End: 2020-07-14
Payer: COMMERCIAL

## 2020-07-14 VITALS
HEIGHT: 65 IN | HEART RATE: 70 BPM | OXYGEN SATURATION: 99 % | BODY MASS INDEX: 34.92 KG/M2 | SYSTOLIC BLOOD PRESSURE: 150 MMHG | TEMPERATURE: 97.3 F | WEIGHT: 209.6 LBS | DIASTOLIC BLOOD PRESSURE: 80 MMHG

## 2020-07-14 DIAGNOSIS — I25.119 CORONARY ARTERY DISEASE WITH ANGINA PECTORIS, UNSPECIFIED VESSEL OR LESION TYPE, UNSPECIFIED WHETHER NATIVE OR TRANSPLANTED HEART (HCC): ICD-10-CM

## 2020-07-14 DIAGNOSIS — I10 ESSENTIAL HYPERTENSION: ICD-10-CM

## 2020-07-14 DIAGNOSIS — E78.2 MIXED HYPERLIPIDEMIA: ICD-10-CM

## 2020-07-14 DIAGNOSIS — F32.A ANXIETY AND DEPRESSION: ICD-10-CM

## 2020-07-14 DIAGNOSIS — E11.65 UNCONTROLLED TYPE 2 DIABETES MELLITUS WITH HYPERGLYCEMIA (HCC): Primary | ICD-10-CM

## 2020-07-14 DIAGNOSIS — Z00.00 MEDICARE ANNUAL WELLNESS VISIT, SUBSEQUENT: ICD-10-CM

## 2020-07-14 DIAGNOSIS — Z12.31 VISIT FOR SCREENING MAMMOGRAM: ICD-10-CM

## 2020-07-14 DIAGNOSIS — F41.9 ANXIETY AND DEPRESSION: ICD-10-CM

## 2020-07-14 PROCEDURE — 1036F TOBACCO NON-USER: CPT | Performed by: FAMILY MEDICINE

## 2020-07-14 PROCEDURE — 1111F DSCHRG MED/CURRENT MED MERGE: CPT | Performed by: FAMILY MEDICINE

## 2020-07-14 PROCEDURE — 3008F BODY MASS INDEX DOCD: CPT | Performed by: FAMILY MEDICINE

## 2020-07-14 PROCEDURE — 3079F DIAST BP 80-89 MM HG: CPT | Performed by: FAMILY MEDICINE

## 2020-07-14 PROCEDURE — G0439 PPPS, SUBSEQ VISIT: HCPCS | Performed by: FAMILY MEDICINE

## 2020-07-14 PROCEDURE — 3046F HEMOGLOBIN A1C LEVEL >9.0%: CPT | Performed by: FAMILY MEDICINE

## 2020-07-14 PROCEDURE — 1160F RVW MEDS BY RX/DR IN RCRD: CPT | Performed by: FAMILY MEDICINE

## 2020-07-14 PROCEDURE — 3077F SYST BP >= 140 MM HG: CPT | Performed by: FAMILY MEDICINE

## 2020-07-14 PROCEDURE — 99214 OFFICE O/P EST MOD 30 MIN: CPT | Performed by: FAMILY MEDICINE

## 2020-07-14 PROCEDURE — 4010F ACE/ARB THERAPY RXD/TAKEN: CPT | Performed by: FAMILY MEDICINE

## 2020-07-14 PROCEDURE — 1125F AMNT PAIN NOTED PAIN PRSNT: CPT | Performed by: FAMILY MEDICINE

## 2020-07-14 PROCEDURE — 1170F FXNL STATUS ASSESSED: CPT | Performed by: FAMILY MEDICINE

## 2020-07-14 RX ORDER — LISINOPRIL 20 MG/1
20 TABLET ORAL DAILY
Qty: 90 TABLET | Refills: 2 | Status: CANCELLED | OUTPATIENT
Start: 2020-07-14

## 2020-07-14 RX ORDER — LISINOPRIL AND HYDROCHLOROTHIAZIDE 20; 12.5 MG/1; MG/1
1 TABLET ORAL DAILY
Qty: 90 TABLET | Refills: 3 | Status: SHIPPED | OUTPATIENT
Start: 2020-07-14 | End: 2021-09-27

## 2020-07-14 RX ORDER — AMLODIPINE BESYLATE 5 MG/1
5 TABLET ORAL DAILY
Qty: 90 TABLET | Refills: 2 | Status: SHIPPED | OUTPATIENT
Start: 2020-07-14 | End: 2021-04-27

## 2020-07-14 NOTE — PATIENT INSTRUCTIONS
Meal Planning with Diabetes Exchanges   AMBULATORY CARE:   Diabetes exchanges  are servings of food that contain similar amounts of carbohydrate, fat, protein, and calories within a food group  The exchanges can be used to develop a healthy meal plan that helps to keep your blood sugar within the recommended levels  A meal plan with the right amount of carbohydrates is especially important  Your blood sugar naturally rises after you eat carbohydrates  Too many carbohydrates in 1 meal or snack can raise your blood sugar level  Carbohydrates are found in starches, fruit, milk, yogurt, and sweets  How to create a meal plan with exchanges:  A dietitian will work with you to develop a healthy meal plan that is right for you  This meal plan will include the amount of exchanges you can have from each food group throughout the day  Follow your meal plan by keeping track of the amount of exchanges you eat for each meal and snack  Your meal plan will be based on your age, weight, blood sugar levels, medicine, and activity level  Starch food group exchanges:  Each exchange below contains about 15 grams of carbohydrate , 3 grams of protein, 1 gram of fat, and 80 calories  · 1 ounce of white, whole wheat or rye bread (1 slice)    · 1 ounce of bagel (about ¼ of a bagel)    · 1 6-inch flour or corn tortilla or 1 4-inch pancake (about ¼ inch thick)    · ? cup of cooked pasta or rice    · ¾ cup of dry, ready-to-eat cereal with no sugar added     · ½ cup of cooked cereal, such as oatmeal    · 3 shruti cracker squares or 8 animal crackers    · 6 saltine-type crackers or     · 3 cups of popcorn or ¾ ounce of pretzels     · Starchy vegetables and cooked legumes:      ¨ ½ cup of corn, green peas, sweet potatoes, or mashed potatoes     ¨ ¼ of a large baked potato     ¨ 1 cup of acorn, butternut squash, or pumpkin     ¨ ½ cup of beans, lentils, or peas (such as christine, kidney, or black-eyed)    ¨ ?  cup of lima beans  Fruit group exchanges:  Each exchange contains about 15 grams of carbohydrate  and 60 calories  · 1 small (4 ounce) apple, banana orange, or nectarine    · ½ cup of canned or fresh fruit    · ½ cup (4 ounces) of unsweetened fruit juice    · 2 tablespoons of dried fruit  Milk group exchanges:  Each exchange contains about 12 grams of carbohydrate  and 8 grams of protein  The amount of fat and calories in each serving depends on the type of milk (such as whole, low-fat, or fat-free)  · 1 cup fat-free or low-fat milk    · ¾ cup of plain, nonfat yogurt    · 1 cup fat-free, flavored yogurt with artificial (no calorie) sweetener  Non-starchy vegetable group exchanges:  Each exchange contains about 5 grams of carbohydrate , 2 grams of protein, and 25 calories  Examples include beets, broccoli, cabbage, carrots, cauliflower, cucumber, mushrooms, tomatoes, and zucchini  · ½ cup of cooked vegetables or 1 cup of raw vegetables     · ½ cup of vegetable juice  Meat and meat substitute group exchanges:  Each exchange of a lean meat  listed below contains about 7 grams of protein, 0 to 3 grams of fat, and 45 calories  The meat and meat substitutes food group does not contain any carbohydrates  Medium and high-fat meats have more calories  · 1 ounce of chicken or turkey without skin, or 1 ounce of fish (not breaded or fried)     · 1 ounce of lean beef, pork, or lamb     · 1-inch cube or 1 ounce of low-fat cheese     · 2 egg whites or ¼ cup of egg substitute     · ½ cup of tofu  Sweets, desserts, and other carbohydrate group exchanges:   · Sweets and other desserts:  Each exchange has about 15 grams of carbohydrate       ¨ 1 ounce of elizabeth food cake or 2-inch square cake (unfrosted)    ¨ 2 small cookies     ¨ ½ cup of sugar-free, fat-free ice cream    ¨ 1 tablespoon of syrup, jam, jelly, table sugar, or honey    · Combination foods:     ¨ 1 cup of an entrée, such as lasagna, spaghetti with meatballs, macaroni and cheese, and chili with beans (each serving counts as 2 carbohydrate exchanges )     ¨ 1 cup of tomato or vegetable beef soup (each serving counts as 1 carbohydrate exchange )  Fat group exchanges:  Each exchange contains 5 grams of fat and 45 calories  · 1 teaspoon of oil (such as canola, olive, or corn oil)     · 6 almonds or cashews, 10 peanuts, or 4 pecan halves     · 2 tablespoons of avocado     · ½ tablespoon of peanut butter     · 1 teaspoon of regular margarine or 2 teaspoons of low-fat margarine     · 1 teaspoon of regular butter or 1 tablespoon of low-fat butter     · 1 teaspoon of regular mayonnaise or 1 tablespoon of low-fat mayonnaise     · 1 tablespoon of regular salad dressing or 2 tablespoons of low-fat salad dressing  Free foods: The foods on this list are called free foods because they have very few calories  Free foods usually do not increase your blood sugar if you limit them  · 1 tablespoon of catsup or taco sauce     · ¼ cup of salsa     · 2 tablespoons of sugar-free syrup or 2 teaspoons of light jam or jelly     · 1 tablespoon of fat-free salad dressing     · 4 tablespoons of fat-free margarine or fat-free mayonnaise     · Sugar-free drinks: diet soda, sugar-free drink mixes, or mineral water     · Low-sodium bouillon or fat-free broth     · Mustard     · Seasonings such as spices, herbs, and garlic     · Sugar-free gelatin without added fruit  Other healthy nutrition guidelines:   · Eat more fiber  Choose foods that are good sources of fiber, such as fruits, vegetables, and whole grains  Cereals that contain 5 or more grams of fiber per serving are good sources of fiber  Legumes such as garbanzo, christine beans, kidney beans, and lentils are also good sources  · Limit fat  Ask your dietitian or healthcare provider how much fat you should eat each day  Choose foods low in fat, saturated fat, trans fat, and cholesterol  Examples include turkey or chicken without the skin, fish, lean cuts of meat, and beans   Low-fat dairy foods, such as low-fat or fat-free milk and low-fat yogurt are also good choices  Omega-3 fatty acids are healthy fats that are found in canola oil, soybean oil and fatty fish  [place], albacore tuna, and sardines are good sources of omega 3 fatty acids  Eat 2 servings of these types of fish each week  Do not eat fried fish  · Limit sugar  Sugar and sweets must be counted toward the carbohydrate exchanges that you can have within your meal plan  Limit sugar and sweets because they are usually also high in calories and fat  Eat smaller portions of sweets by sharing a dessert or asking for a child-size portion at a restaurant  · Limit sodium  (salt) to about 2,300 mg per day  You may need to eat even less sodium if you have certain medical conditions  Foods high in sodium include soy sauce, potato chips, and soup  · Limit alcohol  Ask your healthcare provider if it is safe for you to drink alcohol  If alcohol is safe for you to have, eat a meal when you drink alcohol  If you drink alcohol on an empty stomach, your blood sugar may drop to a low level  Women should limit alcohol to 1 drink per day  Men should limit alcohol to 2 drinks per day  A drink of alcohol is 5 ounces of wine, 12 ounces of beer, or 1½ ounces of liquor  Other ways to manage your diabetes:   · Control your blood sugar level  Test your blood sugar level regularly and keep a record of the results  Ask your healthcare provider when and how often to test your blood sugar  You may need to check your blood sugar level at least 3 times each day  · Talk to your healthcare provider about your weight  Ask if you need to lose weight, and how much you need to lose  If you are overweight, you may need to make other changes to lose weight  Ask your healthcare provider to help you create a weight loss program      · Exercise  can help to control your blood sugar levels and decrease your risk of heart disease   It can also help you lose or maintain your weight  Get at least 30 minutes of exercise, 5 times each week  Do resistance training (using weights) 2 times each week  Do not sit for longer than 90 minutes  Work with your healthcare provider to plan the best exercise program for you  Contact your healthcare provider if:   · You have high blood sugar levels during a certain time of day, or almost all of the time  · You often have low blood sugar levels  · You have questions or concerns about your condition or care  © 2017 [facility] Information is for End User's use only and may not be sold, redistributed or otherwise used for commercial purposes  All illustrations and images included in CareNotes® are the copyrighted property of A D A M , Inc  or Lazarus Lindsay  The above information is an  only  It is not intended as medical advice for individual conditions or treatments  Talk to your doctor, nurse or pharmacist before following any medical regimen to see if it is safe and effective for you

## 2020-07-14 NOTE — ASSESSMENT & PLAN NOTE
Anxiety with depression stable currently mood is stable and patient is off her Zoloft currently sleeping well and functioning through activities of daily living

## 2020-07-14 NOTE — PROGRESS NOTES
Diabetic Foot Exam    Patient's shoes and socks removed  Falls Plan of Care: Balance, strength, and gait training instructions were provided  BMI Counseling: Body mass index is 34 88 kg/m²  The BMI is above normal  Nutrition recommendations include reducing portion sizes, decreasing overall calorie intake, 3-5 servings of fruits/vegetables daily, moderation in carbohydrate intake, reducing intake of saturated fat and trans fat and reducing intake of cholesterol  Exercise recommendations include exercising 3-5 times per week  Assessment and Plan:     Problem List Items Addressed This Visit     None           Preventive health issues were discussed with patient, and age appropriate screening tests were ordered as noted in patient's After Visit Summary  Personalized health advice and appropriate referrals for health education or preventive services given if needed, as noted in patient's After Visit Summary       History of Present Illness:     Patient presents for Medicare Annual Wellness visit    Patient Care Team:  Abram Pedraza DO as PCP - General (Family Medicine)     Problem List:     Patient Active Problem List   Diagnosis    Mixed hyperlipidemia    Essential hypertension    Uncontrolled type 2 diabetes mellitus with hyperglycemia ([address] )    Breast cancer screening    Anxiety and depression    Perianal abscess    Coronary artery disease with angina pectoris ([address] )    Paronychia of great toe of left foot    Diabetic ketoacidosis without coma associated with type 2 diabetes mellitus ([address] )    Fracture of right humerus with routine healing    History of fall      Past Medical and Surgical History:     Past Medical History:   Diagnosis Date    Diabetes mellitus ([address] )     Hypertension      Past Surgical History:   Procedure Laterality Date    APPENDECTOMY      CHOLECYSTECTOMY      EYE SURGERY Left     LEG SURGERY Right     vein    TONSILLECTOMY      VEIN SURGERY      WRIST SURGERY Right ganglion cyst      Family History:     Family History   Problem Relation Age of Onset    Cancer Mother     Diabetes Father     Hypertension Father     Heart attack Father       Social History:        Social History     Socioeconomic History    Marital status:       Spouse name: Not on file    Number of children: Not on file    Years of education: Not on file    Highest education level: Not on file   Occupational History    Not on file   Social Needs    Financial resource strain: Not on file    Food insecurity:     Worry: Not on file     Inability: Not on file    Transportation needs:     Medical: Not on file     Non-medical: Not on file   Tobacco Use    Smoking status: Former Smoker    Smokeless tobacco: Never Used   Substance and Sexual Activity    Alcohol use: Not Currently    Drug use: Never    Sexual activity: Not on file   Lifestyle    Physical activity:     Days per week: Not on file     Minutes per session: Not on file    Stress: Not on file   Relationships    Social connections:     Talks on phone: Not on file     Gets together: Not on file     Attends Hinduism service: Not on file     Active member of club or organization: Not on file     Attends meetings of clubs or organizations: Not on file     Relationship status: Not on file    Intimate partner violence:     Fear of current or ex partner: Not on file     Emotionally abused: Not on file     Physically abused: Not on file     Forced sexual activity: Not on file   Other Topics Concern    Not on file   Social History Narrative    Not on file      Medications and Allergies:     Current Outpatient Medications   Medication Sig Dispense Refill    amLODIPine (NORVASC) 5 mg tablet Take 1 tablet (5 mg total) by mouth daily 30 tablet 0    atorvastatin (LIPITOR) 40 mg tablet Take 1 tablet (40 mg total) by mouth daily 90 tablet 1    calcium carbonate-vitamin D (OSCAL-D) 500 mg-200 units per tablet Take 1 tablet by mouth daily with breakfast 90 tablet 4    insulin NPH-insulin regular (NovoLIN 70/30) 100 units/mL subcutaneous injection Inject 40 Units under the skin 2 (two) times a day before meals 300 mL 11    Insulin Pen Needle 31G X 6 MM MISC by Does not apply route      lisinopril (ZESTRIL) 20 mg tablet Take 1 tablet (20 mg total) by mouth daily 30 tablet 0    metFORMIN (GLUCOPHAGE-XR) 500 mg 24 hr tablet Take 1 tablet by mouth twice daily 180 tablet 0    mupirocin (BACTROBAN) 2 % ointment Apply topically 2 (two) times a day Apply to affected area twice daily 22 g 0    sertraline (ZOLOFT) 25 mg tablet Take 1 tablet (25 mg total) by mouth daily 90 tablet 2     No current facility-administered medications for this visit  Allergies   Allergen Reactions    Ciprofloxacin     Other      Nitro TD Patch    Simvastatin Hives      Immunizations: There is no immunization history on file for this patient  Health Maintenance:         Topic Date Due    CRC Screening: Colonoscopy  1950    MAMMOGRAM  10/19/2016    Hepatitis C Screening  Completed         Topic Date Due    DTaP,Tdap,and Td Vaccines (1 - Tdap) 09/02/1961    Pneumococcal Vaccine: 65+ Years (1 of 2 - PCV13) 09/02/2013    Influenza Vaccine  07/01/2020      Medicare Health Risk Assessment:     There were no vitals taken for this visit  Emiliano Bolton is here for her Subsequent Wellness visit  Health Risk Assessment:   Patient rates overall health as good  Patient feels that their physical health rating is slightly better  Eyesight was rated as same  Hearing was rated as same  Patient feels that their emotional and mental health rating is same  Pain experienced in the last 7 days has been some  Patient's pain rating has been 5/10  Depression Screening:   PHQ-2 Score: 0  PHQ-9 Score: 5      Fall Risk Screening:    In the past year, patient has experienced: history of falling in past year    Number of falls: 1  Injured during fall?: Yes    Feels unsteady when standing or walking?: Yes    Worried about falling?: Yes      Urinary Incontinence Screening:   Patient has leaked urine accidently in the last six months  Home Safety:  Patient does not have trouble with stairs inside or outside of their home  Patient has working smoke alarms and has working carbon monoxide detector  Home safety hazards include: none  Nutrition:   Current diet is Diabetic and Regular  Medications:   Patient is currently taking over-the-counter supplements  OTC medications include: see medication list  Patient is able to manage medications  Activities of Daily Living (ADLs)/Instrumental Activities of Daily Living (IADLs):   Walk and transfer into and out of bed and chair?: Yes  Dress and groom yourself?: Yes    Bathe or shower yourself?: Yes    Feed yourself? Yes  Do your laundry/housekeeping?: Yes  Manage your money, pay your bills and track your expenses?: Yes  Make your own meals?: Yes    Do your own shopping?: Yes    Previous Hospitalizations:   Any hospitalizations or ED visits within the last 12 months?: Yes    How many hospitalizations have you had in the last year?: 1-2    Advance Care Planning:   Living will: Yes    Durable POA for healthcare:  Yes    Advanced directive: Yes      PREVENTIVE SCREENINGS      Cardiovascular Screening:    General: Screening Not Indicated and History Lipid Disorder      Diabetes Screening:     General: Screening Not Indicated and History Diabetes      Cervical Cancer Screening:    General: Screening Not Indicated      Osteoporosis Screening:    General: Risks and Benefits Discussed      Abdominal Aortic Aneurysm (AAA) Screening:        General: Risks and Benefits Discussed      Lung Cancer Screening:     General: Screening Not Indicated      Hepatitis C Screening:    General: Screening Current      Roxy Or, DO

## 2020-07-14 NOTE — ASSESSMENT & PLAN NOTE
Essential hypertension with blood pressure initially elevated 150/80 repeat is at 130/76 patient will maintain a heart healthy diet and avoid sodium follow-up with me at next office visit within 3 months

## 2020-07-14 NOTE — ASSESSMENT & PLAN NOTE
Mixed hyperlipidemia stable with Lipitor 40 mg continue same dosage avoid saturated fats monitor labels on foods purchased and follow-up with me in 3 months

## 2020-07-14 NOTE — ASSESSMENT & PLAN NOTE
Diabetes uncontrolled with A1c very high at 13 patient will need to make adjustments on her insulin now and discuss endocrinology referral an appointment for accurate management of the diabetes  Lab Results   Component Value Date    HGBA1C 13 0 (H) 06/17/2020

## 2020-07-14 NOTE — PROGRESS NOTES
Assessment/Plan:       Problem List Items Addressed This Visit        Endocrine    Uncontrolled type 2 diabetes mellitus with hyperglycemia ([facility] [address] ) - Primary     Diabetes uncontrolled with A1c very high at 13 patient will need to make adjustments on her insulin now and discuss endocrinology referral an appointment for accurate management of the diabetes  Lab Results   Component Value Date    HGBA1C 13 0 (H) 06/17/2020            Relevant Medications    lisinopril-hydrochlorothiazide (PRINZIDE,ZESTORETIC) 20-12 5 MG per tablet       Cardiovascular and Mediastinum    Essential hypertension      Essential hypertension with blood pressure initially elevated 150/80 repeat is at 130/76 patient will maintain a heart healthy diet and avoid sodium follow-up with me at next office visit within 3 months         Relevant Medications    amLODIPine (NORVASC) 5 mg tablet    lisinopril-hydrochlorothiazide (PRINZIDE,ZESTORETIC) 20-12 5 MG per tablet    Coronary artery disease with angina pectoris (HCC)    Relevant Medications    amLODIPine (NORVASC) 5 mg tablet    lisinopril-hydrochlorothiazide (PRINZIDE,ZESTORETIC) 20-12 5 MG per tablet       Other    Mixed hyperlipidemia      Mixed hyperlipidemia stable with Lipitor 40 mg continue same dosage avoid saturated fats monitor labels on foods purchased and follow-up with me in 3 months         Relevant Medications    lisinopril-hydrochlorothiazide (PRINZIDE,ZESTORETIC) 20-12 5 MG per tablet    Anxiety and depression      Anxiety with depression stable currently mood is stable and patient is off her Zoloft currently sleeping well and functioning through activities of daily living         Relevant Medications    lisinopril-hydrochlorothiazide (PRINZIDE,ZESTORETIC) 20-12 5 MG per tablet    Medicare annual wellness visit, subsequent      Other Visit Diagnoses     Visit for screening mammogram        Relevant Medications    lisinopril-hydrochlorothiazide (PRINZIDE,ZESTORETIC) 20-12 5 MG per tablet    Other Relevant Orders    Mammo screening bilateral w 3d & cad            Subjective:      Patient ID: Chele Mckeon is a 68 y o  female  Patient presents today for general evaluation and checkup uncontrolled diabetes with A1c elevated at 13 now needs adjustment on medication regimen and diet and additionally had an episode where she fell once      The following portions of the patient's history were reviewed and updated as appropriate: allergies, current medications, past family history, past medical history, past social history, past surgical history and problem list     Review of Systems   Constitutional: Negative for chills, fatigue and fever  HENT: Negative for congestion, nosebleeds, rhinorrhea, sinus pressure and sore throat  Eyes: Negative for discharge and redness  Respiratory: Negative for cough and shortness of breath  Cardiovascular: Negative for chest pain, palpitations and leg swelling  Gastrointestinal: Negative for abdominal pain, blood in stool and nausea  Endocrine: Negative for cold intolerance, heat intolerance and polyuria  Genitourinary: Negative for dysuria and frequency  Musculoskeletal: Negative for arthralgias, back pain and myalgias  Skin: Negative for rash  Neurological: Negative for dizziness, weakness and headaches  Hematological: Negative for adenopathy  Psychiatric/Behavioral: Negative for behavioral problems and sleep disturbance  The patient is not nervous/anxious  Objective:      /80 (BP Location: Left arm, Patient Position: Sitting)   Pulse 70   Temp (!) 97 3 °F (36 3 °C)   Ht 5' 5" (1 651 m)   Wt 95 1 kg (209 lb 9 6 oz)   SpO2 99%   BMI 34 88 kg/m²        Physical Exam   Constitutional: She is oriented to person, place, and time  She appears well-developed and well-nourished  No distress  HENT:   Head: Normocephalic and atraumatic     Right Ear: External ear normal    Left Ear: External ear normal    Nose: Nose normal  Mouth/Throat: Oropharynx is clear and moist  No oropharyngeal exudate  Eyes: Pupils are equal, round, and reactive to light  Conjunctivae and EOM are normal  Right eye exhibits no discharge  Left eye exhibits no discharge  No scleral icterus  Neck: Normal range of motion  No JVD present  No thyromegaly present  Cardiovascular: Normal rate, regular rhythm and normal heart sounds  No murmur heard  Pulmonary/Chest: Effort normal  She has no wheezes  She has no rales  She exhibits no tenderness  Abdominal: Soft  Bowel sounds are normal  She exhibits no distension and no mass  There is no tenderness  Musculoskeletal: Normal range of motion  She exhibits no edema, tenderness or deformity  Lymphadenopathy:     She has no cervical adenopathy  Neurological: She is alert and oriented to person, place, and time  She has normal reflexes  She displays normal reflexes  No cranial nerve deficit  Coordination normal    Skin: Skin is warm and dry  No rash noted  Psychiatric: She has a normal mood and affect  Her behavior is normal  Judgment and thought content normal    Nursing note and vitals reviewed  Data:    Laboratory Results: I have personally reviewed the pertinent laboratory results/reports   Radiology/Other Diagnostic Testing Results: I have personally reviewed pertinent reports         Lab Results   Component Value Date    WBC 4 50 (L) 06/20/2020    HGB 10 6 (L) 06/20/2020    HCT 31 0 (L) 06/20/2020    MCV 83 06/20/2020     06/20/2020     Lab Results   Component Value Date     04/09/2018    K 4 2 06/20/2020     06/20/2020    CO2 24 06/20/2020    ANIONGAP 10 1 04/09/2018    BUN 21 06/20/2020    CREATININE 0 76 06/20/2020    GLUF 769 (HH) 06/17/2020    CALCIUM 8 2 (L) 06/20/2020    AST 13 06/17/2020    ALT 21 06/17/2020    ALKPHOS 164 (H) 06/17/2020    PROT 6 6 04/09/2018    BILITOT 0 9 04/09/2018    EGFR 79 06/20/2020     Lab Results   Component Value Date    CHOLESTEROL 94 06/17/2020    CHOLESTEROL 90 05/31/2019    CHOLESTEROL 92 09/10/2018     Lab Results   Component Value Date    HDL 14 (L) 06/17/2020    HDL 19 (L) 05/31/2019    HDL 23 (L) 09/10/2018     Lab Results   Component Value Date    LDLCALC 41 06/17/2020    LDLCALC 43 05/31/2019    LDLCALC 50 09/10/2018     Lab Results   Component Value Date    TRIG 196 (H) 06/17/2020    TRIG 140 05/31/2019    TRIG 94 09/10/2018     No results found for: [place], [place]  Lab Results   Component Value Date    [id number] 0 697 06/17/2020     Lab Results   Component Value Date    HGBA1C 13 0 (H) 06/17/2020     No results found for: ALISIA Pereira, DO

## 2020-07-16 ENCOUNTER — APPOINTMENT (OUTPATIENT)
Dept: PHYSICAL THERAPY | Facility: CLINIC | Age: 70
End: 2020-07-16
Payer: COMMERCIAL

## 2020-07-21 ENCOUNTER — APPOINTMENT (OUTPATIENT)
Dept: RADIOLOGY | Facility: MEDICAL CENTER | Age: 70
End: 2020-07-21
Payer: COMMERCIAL

## 2020-07-21 ENCOUNTER — OFFICE VISIT (OUTPATIENT)
Dept: OBGYN CLINIC | Facility: CLINIC | Age: 70
End: 2020-07-21
Payer: COMMERCIAL

## 2020-07-21 VITALS
SYSTOLIC BLOOD PRESSURE: 127 MMHG | WEIGHT: 209 LBS | DIASTOLIC BLOOD PRESSURE: 75 MMHG | BODY MASS INDEX: 34.82 KG/M2 | TEMPERATURE: 96.7 F | HEIGHT: 65 IN | HEART RATE: 71 BPM

## 2020-07-21 DIAGNOSIS — S42.291D CLOSED 3-PART FRACTURE OF PROXIMAL END OF RIGHT HUMERUS WITH ROUTINE HEALING, SUBSEQUENT ENCOUNTER: ICD-10-CM

## 2020-07-21 DIAGNOSIS — S42.291D CLOSED 3-PART FRACTURE OF PROXIMAL END OF RIGHT HUMERUS WITH ROUTINE HEALING, SUBSEQUENT ENCOUNTER: Primary | ICD-10-CM

## 2020-07-21 PROCEDURE — 73030 X-RAY EXAM OF SHOULDER: CPT

## 2020-07-21 PROCEDURE — 3074F SYST BP LT 130 MM HG: CPT | Performed by: ORTHOPAEDIC SURGERY

## 2020-07-21 PROCEDURE — 1160F RVW MEDS BY RX/DR IN RCRD: CPT | Performed by: ORTHOPAEDIC SURGERY

## 2020-07-21 PROCEDURE — 3078F DIAST BP <80 MM HG: CPT | Performed by: ORTHOPAEDIC SURGERY

## 2020-07-21 PROCEDURE — 3008F BODY MASS INDEX DOCD: CPT | Performed by: ORTHOPAEDIC SURGERY

## 2020-07-21 PROCEDURE — 1036F TOBACCO NON-USER: CPT | Performed by: ORTHOPAEDIC SURGERY

## 2020-07-21 PROCEDURE — 3046F HEMOGLOBIN A1C LEVEL >9.0%: CPT | Performed by: ORTHOPAEDIC SURGERY

## 2020-07-21 PROCEDURE — 1170F FXNL STATUS ASSESSED: CPT | Performed by: ORTHOPAEDIC SURGERY

## 2020-07-21 PROCEDURE — 1111F DSCHRG MED/CURRENT MED MERGE: CPT | Performed by: ORTHOPAEDIC SURGERY

## 2020-07-21 PROCEDURE — 99214 OFFICE O/P EST MOD 30 MIN: CPT | Performed by: ORTHOPAEDIC SURGERY

## 2020-07-21 NOTE — PROGRESS NOTES
Chief Complaint  Right proximal humerus follow-up    History Of Presenting Illness  Roman Zuniga 1950 presents for follow-up of right proximal humerus fracture  The patient is 5 weeks s/p fall down stairs at home  She reports she is doing well overall  She has attended 2 sessions of physical therapy  She times to go once a week but struggles to pay her co-pay so has decided to go every-other-week  PT has provided a home exercise program and she feels that she is making gains  Current Medications  Current Outpatient Medications   Medication Sig Dispense Refill    amLODIPine (NORVASC) 5 mg tablet Take 1 tablet (5 mg total) by mouth daily 90 tablet 2    atorvastatin (LIPITOR) 40 mg tablet Take 1 tablet (40 mg total) by mouth daily 90 tablet 1    calcium carbonate-vitamin D (OSCAL-D) 500 mg-200 units per tablet Take 1 tablet by mouth daily with breakfast (Patient not taking: Reported on 7/14/2020) 90 tablet 4    insulin NPH-insulin regular (NovoLIN 70/30) 100 units/mL subcutaneous injection Inject 40 Units under the skin 2 (two) times a day before meals (Patient taking differently: Inject 45 Units under the skin 2 (two) times a day before meals ) 300 mL 11    Insulin Pen Needle 31G X 6 MM MISC by Does not apply route      lisinopril (ZESTRIL) 20 mg tablet Take 1 tablet (20 mg total) by mouth daily 30 tablet 0    lisinopril-hydrochlorothiazide (PRINZIDE,ZESTORETIC) 20-12 5 MG per tablet Take 1 tablet by mouth daily 90 tablet 3    metFORMIN (GLUCOPHAGE-XR) 500 mg 24 hr tablet Take 1 tablet by mouth twice daily 180 tablet 0    mupirocin (BACTROBAN) 2 % ointment Apply topically 2 (two) times a day Apply to affected area twice daily (Patient not taking: Reported on 7/14/2020) 22 g 0    sertraline (ZOLOFT) 25 mg tablet Take 1 tablet (25 mg total) by mouth daily (Patient not taking: Reported on 7/14/2020) 90 tablet 2     No current facility-administered medications for this visit          Current Problems    Active Problems:   Patient Active Problem List    Diagnosis Date Noted    Medicare annual wellness visit, subsequent 07/14/2020    Diabetic ketoacidosis without coma associated with type 2 diabetes mellitus ([facility] [address] ) 06/18/2020    Fracture of right humerus with routine healing 06/18/2020    History of fall 06/18/2020    Paronychia of great toe of left foot 10/28/2019    Coronary artery disease with angina pectoris ([address] ) 10/16/2019    Anxiety and depression 06/18/2019    Perianal abscess 06/18/2019    Breast cancer screening 06/04/2019    Mixed hyperlipidemia 02/25/2019    Essential hypertension 02/25/2019    Uncontrolled type 2 diabetes mellitus with hyperglycemia ([facility] [address] ) 02/25/2019         Review of Systems:    General: negative for - chills, fatigue, fever,  weight gain or weight loss  Psychological: negative for - anxiety, behavioral disorder, concentration difficulties  Ophthalmic: negative for - blurry vision, decreased vision, double vision,      Past Medical History:   Past Medical History:   Diagnosis Date    Diabetes mellitus ([facility] [address] )     Hypertension        Past Surgical History:   Past Surgical History:   Procedure Laterality Date    APPENDECTOMY      CHOLECYSTECTOMY      EYE SURGERY Left     LEG SURGERY Right     vein    TONSILLECTOMY      VEIN SURGERY      WRIST SURGERY Right     ganglion cyst       Family History:  Family history reviewed and non-contributory  Family History   Problem Relation Age of Onset    Cancer Mother     Diabetes Father     Hypertension Father     Heart attack Father        Social History:  Social History     Socioeconomic History    Marital status:       Spouse name: Not on file    Number of children: Not on file    Years of education: Not on file    Highest education level: Not on file   Occupational History    Not on file   Social Needs    Financial resource strain: Not on file    Food insecurity:     Worry: Not on file     Inability: Not on file    Transportation needs:     Medical: Not on file     Non-medical: Not on file   Tobacco Use    Smoking status: Former Smoker     Packs/day: 1 00     Years: 3 00     Pack years: 3 00     Types: Cigarettes     Last attempt to quit: 1975     Years since quittin 5    Smokeless tobacco: Never Used   Substance and Sexual Activity    Alcohol use: Not Currently    Drug use: Never    Sexual activity: Not on file   Lifestyle    Physical activity:     Days per week: Not on file     Minutes per session: Not on file    Stress: Not on file   Relationships    Social connections:     Talks on phone: Not on file     Gets together: Not on file     Attends [employer] service: Not on file     Active member of club or organization: Not on file     Attends meetings of clubs or organizations: Not on file     Relationship status: Not on file    Intimate partner violence:     Fear of current or ex partner: Not on file     Emotionally abused: Not on file     Physically abused: Not on file     Forced sexual activity: Not on file   Other Topics Concern    Not on file   Social History Narrative    Not on file       Allergies: Allergies   Allergen Reactions    Ciprofloxacin     Other      Nitro TD Patch    Simvastatin Hives           Physical ExaminationThere were no vitals taken for this visit  Gen: Alert and oriented to person, place, time  HEENT: EOMI, eyes clear, moist mucus membranes, hearing intact      Orthopedic Exam  The right shoulder exam demonstrates skin intact, no erythema, no ecchymosis  Mild swelling  No tenderness to palpation  Gentle circumduction of the shoulder elicits no complaints  Passively, she has 90 degrees of forward flexion, 90 degrees of IR, and 45 degrees of ER  She has full elbow and forearm ROM without pain  Motor and sensory exams are grossly intact  +2 Radial pulse  Limb is warm and well perfused with good color and capillary refill of the digits                Impression  Right proximal humerus fracture        XRs of the right shoulder performed today in clinic demonstrate fracture of the proximal humerus without interval changes in alignment and abundant callous formation  Plan    Continue PT-may begin Active and Active-assisted ROM  Continue use of sling with out of the house, may discontinue sling when at home  OTC analgesics prn pain  F/U in 4 weeks  Repeat XRs to be obtained upon arrival      Rhiannon Petty PA-C        Portions of the record may have been created with voice recognition software  Occasional wrong word or "sound a like" substitutions may have occurred due to the inherent limitations of voice recognition software  Read the chart carefully and recognize, using context, where substitutions have occurred

## 2020-07-23 ENCOUNTER — OFFICE VISIT (OUTPATIENT)
Dept: PHYSICAL THERAPY | Facility: CLINIC | Age: 70
End: 2020-07-23
Payer: COMMERCIAL

## 2020-07-23 DIAGNOSIS — S42.291D: Primary | ICD-10-CM

## 2020-07-23 PROCEDURE — 97110 THERAPEUTIC EXERCISES: CPT

## 2020-07-23 PROCEDURE — 97140 MANUAL THERAPY 1/> REGIONS: CPT

## 2020-07-23 NOTE — PROGRESS NOTES
Daily Note     Today's date: 2020  Patient name: Vishal Borges  : 1950  MRN: [id number]  Referring provider: Maximino Caballero MD  Dx:   Encounter Diagnosis     ICD-10-CM    1  Closed 3-part fracture of proximal end of right humerus, with routine healing, subsequent encounter S42 291D        Start Time: 1451          Subjective: Patient states she has 0/10pain in the arm  She only has pain with certain exercises  She reports seeing the MD on Tuesday  He told her that she only has to wear her sling when she leaves the home  She has been doing that since  Yesterday is the first time she was able to shower by herself  Objective: See treatment diary below    Patient's home exercise program updated to include putty exercises  Demonstrations performed  Handout issued and explained  Yellow theraputty issued for home use  Assessment: Tolerated treatment well  Patient would benefit from continued PT for stretching and strengthening  Patient has a tendency to use the arm at times,until she remembers her precautions with the arm  Patient was marcos to increase some of her exercises and add exercises without difficulty or pain  She has a tendency to guard at times with some of her stretches  Patient seemed to understand all education given during today's session  Patient felt ok with today's session  Plan: Continue per plan of care  Progress treatment as tolerated         Precautions: balance dysfunction, history of falls  Progress note:   POC: 10/2    Manuals      Right shoulder, elbow, wrist PROM  KB Stretches performed x 10 min                             Neuro Re-Ed                                                                Ther Ex        pulleys        PROM elbow flexion 5"x10 10"x10 :10x10     Table slides  Flexion 5"x10 Flexion 5"x10     peldulums 10x ea 10x ea x10 ea     Wrist AROM 10x ea 1# 3x10ea  0# supination/pronation, flex 0# x30 sup/pro  1# x30 rest       scap retraction 10x 10x x15     PROM supine forward elevation  Pain; guarding      Nuts/ bolts    1 min low side screw/                                                                     Ther Activity           Yellow 30x Yellow  and fingers x30ea     Grooved pegboard  6x x10 in/out     webbing  Finger flex, yellow 30x Finger flex, yellow 30x red    Gait Training                        Modalities

## 2020-07-30 ENCOUNTER — EVALUATION (OUTPATIENT)
Dept: PHYSICAL THERAPY | Facility: CLINIC | Age: 70
End: 2020-07-30
Payer: COMMERCIAL

## 2020-07-30 DIAGNOSIS — E11.65 UNCONTROLLED TYPE 2 DIABETES MELLITUS WITH HYPERGLYCEMIA (HCC): ICD-10-CM

## 2020-07-30 DIAGNOSIS — S42.291D: Primary | ICD-10-CM

## 2020-07-30 PROCEDURE — 97140 MANUAL THERAPY 1/> REGIONS: CPT | Performed by: PHYSICAL THERAPIST

## 2020-07-30 PROCEDURE — 97110 THERAPEUTIC EXERCISES: CPT | Performed by: PHYSICAL THERAPIST

## 2020-07-30 RX ORDER — METFORMIN HYDROCHLORIDE 500 MG/1
500 TABLET, EXTENDED RELEASE ORAL 2 TIMES DAILY
Qty: 180 TABLET | Refills: 0 | Status: SHIPPED | OUTPATIENT
Start: 2020-07-30 | End: 2020-10-13 | Stop reason: SDUPTHER

## 2020-07-30 NOTE — TELEPHONE ENCOUNTER
patient had stopped the metformin since it was recalled and now her BS are going up every morning, today after 9 hour fast 259    Can she have something else?

## 2020-07-30 NOTE — PROGRESS NOTES
PT Re-Evaluation     Today's date: 2020  Patient name: Josiane Romero  : 1950  MRN: [id number]  Referring provider: Kesha Castro MD  Dx:   Encounter Diagnosis     ICD-10-CM    1  Closed 3-part fracture of proximal end of right humerus, with routine healing, subsequent encounter S42 291D        Start Time: 9302  Stop Time: 1710  Total time in clinic (min): 55 minutes    Assessment/Plan   Assessment details: Josiane Romero has been seen in outpatient PT for 4 sessions beginning 20  Patient is a 68 y o  female with diagnosis of proximal fracture of humerus and past medical history significant for DM, appendectomy, bilateral eye surgery, right wrist surgery, HTN  Re-assessment today shows significant improvement with FOTO increasing from 19% function to 50%  Overall patient shows improvement in active and passive range of motion, strength, and decreased symptom intensity  Continuation of skilled PT is indicated working to normalize right shoulder range of motion and strength to be able to dress, lift, reach, and carry at prior level of function without need for assistance  Impairments: abnormal muscle firing, abnormal or restricted ROM, impaired physical strength, lacks appropriate home exercise program, pain with function, scapular dyskinesis and poor posture     Goals  STG (6 weeks)  1  Patient's right shoulder flexion AROM will increase to 90 with 2/10 pain for increased ability to perform overhead ADLs  - MET (85 degrees)  2  Patient will have 0/10 pain in right UE at rest  -MET  3  Patient's right shoulder strength will increase to 3/5 for increased ability to lift  LTG (12 weeks)  1  Patient's UE AROM equal bilaterally for ability to complete hair hygiene, overhead, and behind the back ADLs  2  Patient's UE strength will be equal bilaterally for ability to lift and carry at PLOF     3  Patient will be independent with home exercise program for continued maintenance post PT discharge  Plan  Plan details: Progress note in 4 weeks  Patient opted to be seen 1x/week due to high co-pay  Patient was given updated HEP   Patient would benefit from: skilled physical therapy  Planned modality interventions: cryotherapy, unattended electrical stimulation and thermotherapy: hydrocollator packs  Planned therapy interventions: manual therapy, neuromuscular re-education, therapeutic activities, therapeutic exercise and home exercise program  Frequency: 2-3x/ week  Duration in weeks: 12  Plan of Care beginning date: 2020  Plan of Care expiration date: 10/2/2020  Treatment plan discussed with: patient and PTA      Subjective   History of Present Illness  Date of onset: 2020  Subjective : Patient states 50% improvement since the start of PT  Has been released from the sling while at home, is to wear in the community  May begin AAROM and AROM per MD  Patient states improvement with dressing, washing, lifting light # (coffee cup, plate)  Continues to have difficulty with reaching and lifting  Mechanism of injury: Wai Rivera is a 68 y o  female who presents to outpatient Physical Therapy today with complaints of right arm pain  Patient states she was descending stairs to basement when she miss the last step falling injuring arm, knees, and head  Was driven to the hospital where she was diagnosed with right humeral fx  Saw ortho who took x-rays stating arm was broken in 3 places and to begin PT  Was also given a sling  To f/u in 2 weeks  Ortho instruction: Pendulum exercises for the shoulder  Exercises for the elbow and hand  Nonweightbearing for the next 4 weeks    X-ray IMPRESSION:     Acute comminuted fracture of the proximal right humerus  Was recently in hospital for DM on an insulin drip       Pain      Current pain ratin  0  At best pain ratin  0  At worst pain ratin  3  Location: rgight elbow and proximal arm   Quality: sharp  Relieving factors: relaxation and rest    Social Support  Lives with: adult children    Employment status: not working  Hand dominance: right      Diagnostic Tests  X-ray: abnormal  Patient Goals  Patient goals for therapy: decreased pain and increased motion  Patient goal: "Get it moving"  Objective   Cervical/Thoracic Screen   Cervical range of motion within normal limits    Neurological Testing     Sensation     Shoulder   Left Shoulder   Intact: light touch    Right Shoulder   Intact: light touch    Active Range of Motion   7/30  Left Shoulder   Flexion: 167 degrees   Abduction: 145 degrees   External rotation BTH: T5   Internal rotation BTB: T6     Right Shoulder   Flexion: NT    85  Abduction: NT     60  External rotation at 45: NT  35  Internal rotation at 45: NT  70    Passive Range of Motion    7/30    Right Shoulder   Flexion: 45 degrees with pain   150  Abduction: 60 degrees with pain  110  External rotation 0°: 25 degrees with pain 40  Internal rotation 0°: to belly  with pain  80    Right Elbow   Flexion: WFL  Extension: -44 degrees with pain    Strength/Myotome Testing  7/30    Left Shoulder   Normal muscle strength    Right shoulder  Flexion: NT    3  Abduction: NT    3  IR: NT     3+  ER: NT    3-    Left Wrist/Hand      (2nd hand position)     Trial 1: 36    Trial 2: 34    Trial 3: 35    Right Wrist/Hand      (2nd hand position)     Trial 1: 19    Trial 2: 18    Trial 3: 16    Additional Strength Details  R UE strength NT    General Comments:      Shoulder Comments       FOTO: 50% was 19% function, 54% predicted function            Precautions: balance dysfunction, history of falls  MD instruction 7/21: Continue PT-may begin Active and Active-assisted ROM  Continue use of sling with out of the house, may discontinue sling when at home     Progress note: 8/2  POC: 10/2    Manuals 7/2 7/9 7/23 7/30    Right shoulder, elbow, wrist PROM  KB Stretches performed x 10 min Stretches performed x 10 min Neuro Re-Ed        Shoulder isometrics     *                                                   Ther Ex        pulleys    5"x10 flex, abd    PROM elbow flexion 5"x10 10"x10 :10x10 prn    Table slides  Flexion 5"x10 Flexion 5"x10 prn    peldulums 10x ea 10x ea x10 ea prn    Wrist AROM 10x ea 1# 3x10ea  0# supination/pronation, flex 0# x30 sup/pro  1# x30 rest   prn    scap retraction 10x 10x x15 prn    PROM supine forward elevation  Pain; guarding  nv    Nuts/ bolts    1 min low side screw/ prn            Cane supine flex, abd, ER    5"x10    Seated cane ER    5"x10    Standing cane flex, abd    10x    Wall walks flexion, scaption    Flexion 8x                            Ther Activity           Yellow 30x Yellow  and fingers x30ea nv    Grooved pegboard  6x x10 in/out prn    webbing  Finger flex, yellow 30x Finger flex, yellow 30x Red nv    Gait Training                        Modalities                        Tamara Weber was given a handout and verbal instruction of customized home exercise program  Patient accepted program and was able to demonstrate exercises

## 2020-07-30 NOTE — TELEPHONE ENCOUNTER
Contact the patient she should speak to her pharmacist there were certain lot #'s metformin recalled however the pharmacist should another formulation that she should renew and refill and resume taking this as most of patients are doing that

## 2020-08-13 ENCOUNTER — APPOINTMENT (OUTPATIENT)
Dept: PHYSICAL THERAPY | Facility: CLINIC | Age: 70
End: 2020-08-13
Payer: COMMERCIAL

## 2020-08-18 ENCOUNTER — OFFICE VISIT (OUTPATIENT)
Dept: OBGYN CLINIC | Facility: CLINIC | Age: 70
End: 2020-08-18
Payer: COMMERCIAL

## 2020-08-18 ENCOUNTER — APPOINTMENT (OUTPATIENT)
Dept: RADIOLOGY | Facility: MEDICAL CENTER | Age: 70
End: 2020-08-18
Payer: COMMERCIAL

## 2020-08-18 VITALS
DIASTOLIC BLOOD PRESSURE: 76 MMHG | BODY MASS INDEX: 33.37 KG/M2 | HEART RATE: 80 BPM | TEMPERATURE: 98.2 F | WEIGHT: 200.3 LBS | SYSTOLIC BLOOD PRESSURE: 127 MMHG | HEIGHT: 65 IN

## 2020-08-18 DIAGNOSIS — S42.291D CLOSED 3-PART FRACTURE OF PROXIMAL END OF RIGHT HUMERUS WITH ROUTINE HEALING, SUBSEQUENT ENCOUNTER: ICD-10-CM

## 2020-08-18 DIAGNOSIS — S42.291D CLOSED 3-PART FRACTURE OF PROXIMAL END OF RIGHT HUMERUS WITH ROUTINE HEALING, SUBSEQUENT ENCOUNTER: Primary | ICD-10-CM

## 2020-08-18 PROCEDURE — 99213 OFFICE O/P EST LOW 20 MIN: CPT | Performed by: ORTHOPAEDIC SURGERY

## 2020-08-18 PROCEDURE — 1111F DSCHRG MED/CURRENT MED MERGE: CPT | Performed by: ORTHOPAEDIC SURGERY

## 2020-08-18 PROCEDURE — 1036F TOBACCO NON-USER: CPT | Performed by: ORTHOPAEDIC SURGERY

## 2020-08-18 PROCEDURE — 73030 X-RAY EXAM OF SHOULDER: CPT

## 2020-08-18 PROCEDURE — 3078F DIAST BP <80 MM HG: CPT | Performed by: ORTHOPAEDIC SURGERY

## 2020-08-18 PROCEDURE — 1160F RVW MEDS BY RX/DR IN RCRD: CPT | Performed by: ORTHOPAEDIC SURGERY

## 2020-08-18 PROCEDURE — 3074F SYST BP LT 130 MM HG: CPT | Performed by: ORTHOPAEDIC SURGERY

## 2020-08-18 PROCEDURE — 3008F BODY MASS INDEX DOCD: CPT | Performed by: ORTHOPAEDIC SURGERY

## 2020-08-18 NOTE — PROGRESS NOTES
69 y o female presents for two-month follow-up of fracture right proximal humerus comminuted  Initial visit was 06/17/2020  She was last seen on 07/21/2020 and continued in limited physical therapy due to her insurance  She presents today for x-ray and follow-up  She notes she still has trouble sleeping on that side  She is able to wash her hair with that hand  She is generally well pleased with her outcome and is not looking to have any surgery  She denies any numbness or tingling  She feels her range of motion has improved since last visit      Review of Systems  Review of systems negative unless otherwise specified in HPI    Past Medical History  Past Medical History:   Diagnosis Date    Diabetes mellitus ([address] )     Hypertension      Past Surgical History  Past Surgical History:   Procedure Laterality Date    APPENDECTOMY      CHOLECYSTECTOMY      EYE SURGERY Left     LEG SURGERY Right     vein    TONSILLECTOMY      VEIN SURGERY      WRIST SURGERY Right     ganglion cyst     Current Medications  Current Outpatient Medications on File Prior to Visit   Medication Sig Dispense Refill    amLODIPine (NORVASC) 5 mg tablet Take 1 tablet (5 mg total) by mouth daily 90 tablet 2    atorvastatin (LIPITOR) 40 mg tablet Take 1 tablet (40 mg total) by mouth daily 90 tablet 1    calcium carbonate-vitamin D (OSCAL-D) 500 mg-200 units per tablet Take 1 tablet by mouth daily with breakfast (Patient not taking: Reported on 7/14/2020) 90 tablet 4    insulin NPH-insulin regular (NovoLIN 70/30) 100 units/mL subcutaneous injection Inject 40 Units under the skin 2 (two) times a day before meals (Patient taking differently: Inject 45 Units under the skin 2 (two) times a day before meals ) 300 mL 11    Insulin Pen Needle 31G X 6 MM MISC by Does not apply route      lisinopril (ZESTRIL) 20 mg tablet Take 1 tablet (20 mg total) by mouth daily (Patient not taking: Reported on 7/21/2020) 30 tablet 0    lisinopril-hydrochlorothiazide (PRINZIDE,ZESTORETIC) 20-12 5 MG per tablet Take 1 tablet by mouth daily 90 tablet 3    metFORMIN (GLUCOPHAGE-XR) 500 mg 24 hr tablet Take 1 tablet (500 mg total) by mouth 2 (two) times a day 180 tablet 0    mupirocin (BACTROBAN) 2 % ointment Apply topically 2 (two) times a day Apply to affected area twice daily 22 g 0    sertraline (ZOLOFT) 25 mg tablet Take 1 tablet (25 mg total) by mouth daily (Patient not taking: Reported on 7/14/2020) 90 tablet 2     No current facility-administered medications on file prior to visit  Recent Labs [facility])  0   Lab Value Date/Time    HCT 31 0 (L) 06/20/2020 0436    HCT 40 7 04/09/2018 0849    HGB 10 6 (L) 06/20/2020 0436    HGB 13 4 04/09/2018 0849    WBC 4 50 (L) 06/20/2020 0436    WBC 5 2 04/09/2018 0849    INR 1 13 06/12/2020 1833    HGBA1C 13 0 (H) 06/17/2020 1057    HGBA1C 12 2 (H) 04/09/2018 0849     Physical exam  Body mass index is 33 33 kg/m²  · General: Awake, Alert, Oriented  · Eyes: Pupils equal, round and reactive to light  · Heart: regular rate and rhythm  · Lungs: No audible wheezing  · Abdomen: soft  right Shoulder  · No gross outward deformity  No ecchymosis swelling erythema or warmth  She has full range of motion of the elbow  Passive range of motion of the shoulder abduction 85°, external rotation with the shoulder abducted is approximately 75°, internal rotation the arm abducted is prox 40 degree  Forward flexion is 115°  Radial pulse 4/4  Light touch sensation intact  No significant pain with palpation at the fracture site   4/+5 strength with resisted external rotation with the elbow at the side  She has a negative Neer's test   There is some weakness with supraspinatus empty can testing  Patient does hike her shoulder with attempted abduction and flexion      Procedure  None    Imaging  I personally reviewed x-rays taken the office today right shoulder which notes further healing and consolidation of the proximal humerus fracture with the humeral head maintained in the glenohumeral joint  1  Closed 3-part fracture of proximal end of right humerus with routine healing, subsequent encounter      Assessment:  right shoulder 8 weeks status post fracture progressing satisfactorily with some rotator cuff weakness  Plan: We will see the patient back in 6 weeks with repeat x-rays of the right shoulder and clinical exam   Consideration is for MRI evaluation at that time the patient continues to have rotator cuff weakness for evaluation of tear verses nonuse weakness  Patient may not be in favor of any surgical intervention even if there is a tear  Clinically patient is able to do most things related to her ADLs at current  This note was created with voice recognition software

## 2020-08-18 NOTE — PATIENT INSTRUCTIONS
We will see the patient back in 6 weeks with repeat x-rays of the right shoulder and clinical exam   Consideration is for MRI evaluation at that time the patient continues to have rotator cuff weakness for evaluation of tear verses nonuse weakness  Patient may not be in favor of any surgical intervention even if there is a tear  Clinically patient is able to do most things related to her ADLs at current

## 2020-08-20 ENCOUNTER — OFFICE VISIT (OUTPATIENT)
Dept: PHYSICAL THERAPY | Facility: CLINIC | Age: 70
End: 2020-08-20
Payer: COMMERCIAL

## 2020-08-20 DIAGNOSIS — S42.291D: Primary | ICD-10-CM

## 2020-08-20 PROCEDURE — 97110 THERAPEUTIC EXERCISES: CPT | Performed by: PHYSICAL THERAPIST

## 2020-08-20 PROCEDURE — 97140 MANUAL THERAPY 1/> REGIONS: CPT | Performed by: PHYSICAL THERAPIST

## 2020-08-20 NOTE — PROGRESS NOTES
Daily Note     Today's date: 2020  Patient name: Jeannine Abreu  : 1950  MRN: [id number]  Referring provider: Pati العراقي MD  Dx:   Encounter Diagnosis     ICD-10-CM    1  Closed 3-part fracture of proximal end of right humerus, with routine healing, subsequent encounter  S42 291D        Start Time: 1230  Stop Time: 1315  Total time in clinic (min): 45 minutes    Subjective: Patient states she saw the PA earlier this week who stated x-ray looked good, bone was healing well  Did note weakness of right supraspinatus indicative of possible RC injury  F/u in 6 weeks where RC will be reassessed with possible need for MRI  Patient did state she does not wish to have surgery if a tear is found  Most reported pain after sleeping on shoulder  Objective: See treatment diary below      Assessment: Tolerated treatment well  Full passive motion noted today with restriction during active movement  Patient would benefit from continued PT progressing to strengthen shoulder as tolerated  Plan: Continue per plan of care  Progress treatment as tolerated  Precautions: balance dysfunction, history of falls  MD instruction : Continue PT-may begin Active and Active-assisted ROM  Continue use of sling with out of the house, may discontinue sling when at home     Progress note:   POC: 10/2    Manuals    Right shoulder, elbow, wrist PROM  KB Stretches performed x 10 min Stretches performed x 10 min Stretches performed x 10 min                           Neuro Re-Ed        Shoulder isometrics     5"x5 IR, ER, abd, Flex, ext                                                   Ther Ex        pulleys    5"x10 flex, abd 5"x10 flex, abd   PROM elbow flexion 5"x10 10"x10 :10x10 prn    Table slides  Flexion 5"x10 Flexion 5"x10 prn    peldulums 10x ea 10x ea x10 ea prn    Wrist AROM 10x ea 1# 3x10ea  0# supination/pronation, flex 0# x30 sup/pro  1# x30 rest   prn    scap retraction 10x 10x x15 prn    PROM supine forward elevation  Pain; guarding  nv    Nuts/ bolts    1 min low side screw/ prn            Cane supine flex, abd, ER    5"x10 5"x10 flex, er   Seated cane ER    5"x10    Standing cane flex, abd    10x 3x10   Wall walks flexion, scaption    Flexion 8x 5x ea   MTP/LTP     Red, 3x10                   Ther Activity           Yellow 30x Yellow  and fingers x30ea nv Yellow  and fingers x30ea   Grooved pegboard  6x x10 in/out prn    webbing  Finger flex, yellow 30x Finger flex, yellow 30x Red nv    Gait Training                        Modalities                        Taran Ruelas was given a handout and verbal instruction of customized home exercise program  Patient accepted program and was able to demonstrate exercises

## 2020-08-24 DIAGNOSIS — E11.65 UNCONTROLLED TYPE 2 DIABETES MELLITUS WITH HYPERGLYCEMIA (HCC): ICD-10-CM

## 2020-08-24 RX ORDER — HUMAN INSULIN 100 [IU]/ML
45 INJECTION, SUSPENSION SUBCUTANEOUS
Qty: 50 ML | Refills: 11 | Status: SHIPPED | OUTPATIENT
Start: 2020-08-24 | End: 2021-04-22 | Stop reason: SDUPTHER

## 2020-09-08 NOTE — PROGRESS NOTES
PT Discharge    Today's date: 2020  Patient name: Nivia Hummel  : 1950  MRN: [id number]  Referring provider: Mio Yuen MD  Dx:   Encounter Diagnosis     ICD-10-CM    1  Closed 3-part fracture of proximal end of right humerus, with routine healing, subsequent encounter  S42 291D        Assessment/Plan   Assessment details: Nivia Hummel has been seen in outpatient PT for 5 sessions beginning 20  Patient is a 68 y o  female with diagnosis of proximal fracture of humerus and past medical history significant for DM, appendectomy, bilateral eye surgery, right wrist surgery, HTN  Patient would like to self discharge at this time, no longer wishes to continue with PT  Please see below re-evaluation from  for most current objective findings  Impairments: abnormal muscle firing, abnormal or restricted ROM, impaired physical strength, lacks appropriate home exercise program, pain with function, scapular dyskinesis and poor posture     Goals  STG (6 weeks)  1  Patient's right shoulder flexion AROM will increase to 90 with 2/10 pain for increased ability to perform overhead ADLs  - MET (85 degrees)  2  Patient will have 0/10 pain in right UE at rest  -MET  3  Patient's right shoulder strength will increase to 3/5 for increased ability to lift  LTG (12 weeks)  1  Patient's UE AROM equal bilaterally for ability to complete hair hygiene, overhead, and behind the back ADLs  2  Patient's UE strength will be equal bilaterally for ability to lift and carry at PLOF  3  Patient will be independent with home exercise program for continued maintenance post PT discharge  Plan  Plan details: Progress note in 4 weeks  Patient opted to be seen 1x/week due to high co-pay   Patient was given updated HEP   Patient would benefit from: skilled physical therapy  Planned modality interventions: cryotherapy, unattended electrical stimulation and thermotherapy: hydrocollator packs  Planned therapy interventions: manual therapy, neuromuscular re-education, therapeutic activities, therapeutic exercise and home exercise program  Frequency: 2-3x/ week  Duration in weeks: 12  Plan of Care beginning date: 2020  Plan of Care expiration date: 10/2/2020  Treatment plan discussed with: patient and PTA      Subjective   History of Present Illness  Date of onset: 2020  Subjective : Patient states 50% improvement since the start of PT  Has been released from the sling while at home, is to wear in the community  May begin AAROM and AROM per MD  Patient states improvement with dressing, washing, lifting light # (coffee cup, plate)  Continues to have difficulty with reaching and lifting  Mechanism of injury: Shauna Carcamo is a 68 y o  female who presents to outpatient Physical Therapy today with complaints of right arm pain  Patient states she was descending stairs to basement when she miss the last step falling injuring arm, knees, and head  Was driven to the hospital where she was diagnosed with right humeral fx  Saw ortho who took x-rays stating arm was broken in 3 places and to begin PT  Was also given a sling  To f/u in 2 weeks  Ortho instruction: Pendulum exercises for the shoulder  Exercises for the elbow and hand  Nonweightbearing for the next 4 weeks    X-ray IMPRESSION:     Acute comminuted fracture of the proximal right humerus  Was recently in hospital for DM on an insulin drip  Pain      Current pain ratin  0  At best pain ratin  0  At worst pain ratin  3  Location: rgight elbow and proximal arm   Quality: sharp  Relieving factors: relaxation and rest    Social Support  Lives with: adult children    Employment status: not working  Hand dominance: right      Diagnostic Tests  X-ray: abnormal  Patient Goals  Patient goals for therapy: decreased pain and increased motion  Patient goal: "Get it moving"       Objective   Cervical/Thoracic Screen   Cervical range of motion within normal limits    Neurological Testing     Sensation     Shoulder   Left Shoulder   Intact: light touch    Right Shoulder   Intact: light touch    Active Range of Motion   7/30  Left Shoulder   Flexion: 167 degrees   Abduction: 145 degrees   External rotation BTH: T5   Internal rotation BTB: T6     Right Shoulder   Flexion: NT    85  Abduction: NT     60  External rotation at 45: NT  35  Internal rotation at 45: NT  70    Passive Range of Motion    7/30    Right Shoulder   Flexion: 45 degrees with pain   150  Abduction: 60 degrees with pain  110  External rotation 0°: 25 degrees with pain 40  Internal rotation 0°: to belly   with pain  80    Right Elbow   Flexion: WFL  Extension: -44 degrees with pain    Strength/Myotome Testing  7/30    Left Shoulder   Normal muscle strength    Right shoulder  Flexion: NT    3  Abduction: NT    3  IR: NT     3+  ER: NT    3-    Left Wrist/Hand      (2nd hand position)     Trial 1: 36    Trial 2: 34    Trial 3: 35    Right Wrist/Hand      (2nd hand position)     Trial 1: 19    Trial 2: 18    Trial 3: 16    Additional Strength Details  R UE strength NT    General Comments:      Shoulder Comments       FOTO: 50% was 19% function, 54% predicted function

## 2020-10-13 ENCOUNTER — OFFICE VISIT (OUTPATIENT)
Dept: FAMILY MEDICINE CLINIC | Facility: CLINIC | Age: 70
End: 2020-10-13
Payer: COMMERCIAL

## 2020-10-13 VITALS
DIASTOLIC BLOOD PRESSURE: 80 MMHG | TEMPERATURE: 97.7 F | SYSTOLIC BLOOD PRESSURE: 140 MMHG | HEART RATE: 85 BPM | HEIGHT: 65 IN | BODY MASS INDEX: 35.12 KG/M2 | OXYGEN SATURATION: 99 % | WEIGHT: 210.8 LBS

## 2020-10-13 DIAGNOSIS — I25.119 CORONARY ARTERY DISEASE WITH ANGINA PECTORIS, UNSPECIFIED VESSEL OR LESION TYPE, UNSPECIFIED WHETHER NATIVE OR TRANSPLANTED HEART (HCC): Primary | ICD-10-CM

## 2020-10-13 DIAGNOSIS — Z12.11 SCREENING FOR COLON CANCER: ICD-10-CM

## 2020-10-13 DIAGNOSIS — E11.65 UNCONTROLLED TYPE 2 DIABETES MELLITUS WITH HYPERGLYCEMIA (HCC): ICD-10-CM

## 2020-10-13 DIAGNOSIS — F32.A ANXIETY AND DEPRESSION: ICD-10-CM

## 2020-10-13 DIAGNOSIS — E78.2 MIXED HYPERLIPIDEMIA: ICD-10-CM

## 2020-10-13 DIAGNOSIS — E11.65 UNCONTROLLED TYPE 2 DIABETES MELLITUS WITH HYPERGLYCEMIA (HCC): Primary | ICD-10-CM

## 2020-10-13 DIAGNOSIS — F41.9 ANXIETY AND DEPRESSION: ICD-10-CM

## 2020-10-13 DIAGNOSIS — I10 ESSENTIAL HYPERTENSION: ICD-10-CM

## 2020-10-13 DIAGNOSIS — S42.294D OTHER CLOSED NONDISPLACED FRACTURE OF PROXIMAL END OF RIGHT HUMERUS WITH ROUTINE HEALING, SUBSEQUENT ENCOUNTER: ICD-10-CM

## 2020-10-13 PROCEDURE — 1160F RVW MEDS BY RX/DR IN RCRD: CPT | Performed by: FAMILY MEDICINE

## 2020-10-13 PROCEDURE — 1036F TOBACCO NON-USER: CPT | Performed by: FAMILY MEDICINE

## 2020-10-13 PROCEDURE — 3725F SCREEN DEPRESSION PERFORMED: CPT | Performed by: FAMILY MEDICINE

## 2020-10-13 PROCEDURE — 99214 OFFICE O/P EST MOD 30 MIN: CPT | Performed by: FAMILY MEDICINE

## 2020-10-13 PROCEDURE — 3077F SYST BP >= 140 MM HG: CPT | Performed by: FAMILY MEDICINE

## 2020-10-13 PROCEDURE — 3079F DIAST BP 80-89 MM HG: CPT | Performed by: FAMILY MEDICINE

## 2020-10-13 RX ORDER — LANCETS 30 GAUGE
EACH MISCELLANEOUS
COMMUNITY
Start: 2020-07-16

## 2020-10-13 RX ORDER — ATORVASTATIN CALCIUM 40 MG/1
40 TABLET, FILM COATED ORAL DAILY
Qty: 90 TABLET | Refills: 1 | Status: CANCELLED | OUTPATIENT
Start: 2020-10-13

## 2020-10-13 RX ORDER — METFORMIN HYDROCHLORIDE 500 MG/1
500 TABLET, EXTENDED RELEASE ORAL 2 TIMES DAILY
Qty: 180 TABLET | Refills: 1 | Status: CANCELLED | OUTPATIENT
Start: 2020-10-13

## 2020-10-13 RX ORDER — METFORMIN HYDROCHLORIDE 500 MG/1
500 TABLET, EXTENDED RELEASE ORAL 2 TIMES DAILY
Qty: 180 TABLET | Refills: 0 | Status: SHIPPED | OUTPATIENT
Start: 2020-10-13 | End: 2021-01-04

## 2020-10-13 RX ORDER — ATORVASTATIN CALCIUM 40 MG/1
40 TABLET, FILM COATED ORAL DAILY
Qty: 90 TABLET | Refills: 1 | Status: SHIPPED | OUTPATIENT
Start: 2020-10-13 | End: 2021-05-06

## 2020-12-21 ENCOUNTER — APPOINTMENT (EMERGENCY)
Dept: RADIOLOGY | Facility: HOSPITAL | Age: 70
DRG: 682 | End: 2020-12-21
Payer: COMMERCIAL

## 2020-12-21 ENCOUNTER — HOSPITAL ENCOUNTER (INPATIENT)
Facility: HOSPITAL | Age: 70
LOS: 1 days | Discharge: LEFT BEFORE DISCHARGE | DRG: 682 | End: 2020-12-22
Attending: EMERGENCY MEDICINE | Admitting: HOSPITALIST
Payer: COMMERCIAL

## 2020-12-21 ENCOUNTER — OFFICE VISIT (OUTPATIENT)
Dept: URGENT CARE | Facility: CLINIC | Age: 70
End: 2020-12-21
Payer: COMMERCIAL

## 2020-12-21 ENCOUNTER — APPOINTMENT (EMERGENCY)
Dept: CT IMAGING | Facility: HOSPITAL | Age: 70
DRG: 682 | End: 2020-12-21
Payer: COMMERCIAL

## 2020-12-21 VITALS
WEIGHT: 210 LBS | TEMPERATURE: 96.6 F | HEART RATE: 78 BPM | BODY MASS INDEX: 34.99 KG/M2 | HEIGHT: 65 IN | SYSTOLIC BLOOD PRESSURE: 98 MMHG | RESPIRATION RATE: 18 BRPM | DIASTOLIC BLOOD PRESSURE: 53 MMHG | OXYGEN SATURATION: 97 %

## 2020-12-21 DIAGNOSIS — R55 SYNCOPE AND COLLAPSE: ICD-10-CM

## 2020-12-21 DIAGNOSIS — S42.301D: ICD-10-CM

## 2020-12-21 DIAGNOSIS — E86.0 DEHYDRATION: ICD-10-CM

## 2020-12-21 DIAGNOSIS — N17.9 ACUTE KIDNEY INJURY (HCC): ICD-10-CM

## 2020-12-21 DIAGNOSIS — R55 SYNCOPE: ICD-10-CM

## 2020-12-21 DIAGNOSIS — I95.9 HYPOTENSIVE EPISODE: ICD-10-CM

## 2020-12-21 DIAGNOSIS — U07.1 COVID-19: Primary | ICD-10-CM

## 2020-12-21 DIAGNOSIS — R55 SYNCOPE, UNSPECIFIED SYNCOPE TYPE: Primary | ICD-10-CM

## 2020-12-21 LAB
ALBUMIN SERPL BCP-MCNC: 4.1 G/DL (ref 3.5–5.7)
ALP SERPL-CCNC: 41 U/L (ref 55–165)
ALT SERPL W P-5'-P-CCNC: 11 U/L (ref 7–52)
ANION GAP SERPL CALCULATED.3IONS-SCNC: 7 MMOL/L (ref 4–13)
AST SERPL W P-5'-P-CCNC: 9 U/L (ref 13–39)
BACTERIA UR QL AUTO: ABNORMAL /HPF
BASOPHILS # BLD AUTO: 0 THOUSANDS/ΜL (ref 0–0.1)
BASOPHILS NFR BLD AUTO: 0 % (ref 0–2)
BILIRUB SERPL-MCNC: 0.6 MG/DL (ref 0.2–1)
BILIRUB UR QL STRIP: NEGATIVE
BNP SERPL-MCNC: 26 PG/ML (ref 1–100)
BUN SERPL-MCNC: 24 MG/DL (ref 7–25)
CA-I BLD-SCNC: 1.14 MMOL/L (ref 1.12–1.32)
CALCIUM SERPL-MCNC: 8.9 MG/DL (ref 8.6–10.5)
CHLORIDE SERPL-SCNC: 101 MMOL/L (ref 98–107)
CK SERPL-CCNC: 17 U/L (ref 30–192)
CLARITY UR: CLEAR
CO2 SERPL-SCNC: 27 MMOL/L (ref 21–31)
COLOR UR: YELLOW
CREAT SERPL-MCNC: 1.56 MG/DL (ref 0.6–1.2)
CRP SERPL QL: <5 MG/L
EOSINOPHIL # BLD AUTO: 0.1 THOUSAND/ΜL (ref 0–0.61)
EOSINOPHIL NFR BLD AUTO: 1 % (ref 0–5)
ERYTHROCYTE [DISTWIDTH] IN BLOOD BY AUTOMATED COUNT: 13.9 % (ref 11.5–14.5)
FLUAV RNA RESP QL NAA+PROBE: NEGATIVE
FLUBV RNA RESP QL NAA+PROBE: NEGATIVE
GFR SERPL CREATININE-BSD FRML MDRD: 33 ML/MIN/1.73SQ M
GLUCOSE SERPL-MCNC: 208 MG/DL (ref 65–99)
GLUCOSE UR STRIP-MCNC: ABNORMAL MG/DL
HCT VFR BLD AUTO: 39 % (ref 42–47)
HGB BLD-MCNC: 13.1 G/DL (ref 12–16)
HGB UR QL STRIP.AUTO: NEGATIVE
HYALINE CASTS #/AREA URNS LPF: ABNORMAL /LPF
INR PPP: 1.12 (ref 0.84–1.19)
KETONES UR STRIP-MCNC: ABNORMAL MG/DL
LACTATE SERPL-SCNC: 1.3 MMOL/L (ref 0.5–2)
LEUKOCYTE ESTERASE UR QL STRIP: ABNORMAL
LYMPHOCYTES # BLD AUTO: 1 THOUSANDS/ΜL (ref 0.6–4.47)
LYMPHOCYTES NFR BLD AUTO: 19 % (ref 21–51)
MCH RBC QN AUTO: 28.6 PG (ref 26–34)
MCHC RBC AUTO-ENTMCNC: 33.5 G/DL (ref 31–37)
MCV RBC AUTO: 85 FL (ref 81–99)
MONOCYTES # BLD AUTO: 0.3 THOUSAND/ΜL (ref 0.17–1.22)
MONOCYTES NFR BLD AUTO: 6 % (ref 2–12)
MUCOUS THREADS UR QL AUTO: ABNORMAL
NEUTROPHILS # BLD AUTO: 3.7 THOUSANDS/ΜL (ref 1.4–6.5)
NEUTS SEG NFR BLD AUTO: 73 % (ref 42–75)
NITRITE UR QL STRIP: NEGATIVE
NON-SQ EPI CELLS URNS QL MICRO: ABNORMAL /HPF
PH UR STRIP.AUTO: 5 [PH]
PLATELET # BLD AUTO: 258 THOUSANDS/UL (ref 149–390)
PMV BLD AUTO: 7.2 FL (ref 8.6–11.7)
POTASSIUM SERPL-SCNC: 4 MMOL/L (ref 3.5–5.5)
PROT SERPL-MCNC: 7 G/DL (ref 6.4–8.9)
PROT UR STRIP-MCNC: ABNORMAL MG/DL
PROTHROMBIN TIME: 14.3 SECONDS (ref 11.6–14.5)
RBC # BLD AUTO: 4.58 MILLION/UL (ref 3.9–5.2)
RBC #/AREA URNS AUTO: ABNORMAL /HPF
RSV RNA RESP QL NAA+PROBE: NEGATIVE
SARS-COV-2 RNA RESP QL NAA+PROBE: POSITIVE
SODIUM SERPL-SCNC: 135 MMOL/L (ref 134–143)
SP GR UR STRIP.AUTO: >=1.03 (ref 1–1.03)
TRIGL SERPL-MCNC: 216 MG/DL (ref 44–166)
TROPONIN I SERPL-MCNC: <0.03 NG/ML
UROBILINOGEN UR QL STRIP.AUTO: 0.2 E.U./DL
WBC # BLD AUTO: 5.1 THOUSAND/UL (ref 4.8–10.8)
WBC #/AREA URNS AUTO: ABNORMAL /HPF

## 2020-12-21 PROCEDURE — 99285 EMERGENCY DEPT VISIT HI MDM: CPT

## 2020-12-21 PROCEDURE — 80053 COMPREHEN METABOLIC PANEL: CPT | Performed by: EMERGENCY MEDICINE

## 2020-12-21 PROCEDURE — 70450 CT HEAD/BRAIN W/O DYE: CPT

## 2020-12-21 PROCEDURE — 83605 ASSAY OF LACTIC ACID: CPT | Performed by: EMERGENCY MEDICINE

## 2020-12-21 PROCEDURE — 99285 EMERGENCY DEPT VISIT HI MDM: CPT | Performed by: EMERGENCY MEDICINE

## 2020-12-21 PROCEDURE — 84145 PROCALCITONIN (PCT): CPT | Performed by: EMERGENCY MEDICINE

## 2020-12-21 PROCEDURE — 96360 HYDRATION IV INFUSION INIT: CPT

## 2020-12-21 PROCEDURE — 73060 X-RAY EXAM OF HUMERUS: CPT

## 2020-12-21 PROCEDURE — 36415 COLL VENOUS BLD VENIPUNCTURE: CPT | Performed by: EMERGENCY MEDICINE

## 2020-12-21 PROCEDURE — 83880 ASSAY OF NATRIURETIC PEPTIDE: CPT | Performed by: EMERGENCY MEDICINE

## 2020-12-21 PROCEDURE — 81001 URINALYSIS AUTO W/SCOPE: CPT | Performed by: EMERGENCY MEDICINE

## 2020-12-21 PROCEDURE — 82728 ASSAY OF FERRITIN: CPT | Performed by: EMERGENCY MEDICINE

## 2020-12-21 PROCEDURE — 99223 1ST HOSP IP/OBS HIGH 75: CPT | Performed by: NURSE PRACTITIONER

## 2020-12-21 PROCEDURE — 82955 ASSAY OF G6PD ENZYME: CPT | Performed by: EMERGENCY MEDICINE

## 2020-12-21 PROCEDURE — 71045 X-RAY EXAM CHEST 1 VIEW: CPT

## 2020-12-21 PROCEDURE — 0241U HB NFCT DS VIR RESP RNA 4 TRGT: CPT | Performed by: EMERGENCY MEDICINE

## 2020-12-21 PROCEDURE — 83520 IMMUNOASSAY QUANT NOS NONAB: CPT | Performed by: EMERGENCY MEDICINE

## 2020-12-21 PROCEDURE — 93005 ELECTROCARDIOGRAM TRACING: CPT | Performed by: NURSE PRACTITIONER

## 2020-12-21 PROCEDURE — 82550 ASSAY OF CK (CPK): CPT | Performed by: EMERGENCY MEDICINE

## 2020-12-21 PROCEDURE — 82330 ASSAY OF CALCIUM: CPT | Performed by: EMERGENCY MEDICINE

## 2020-12-21 PROCEDURE — 86140 C-REACTIVE PROTEIN: CPT | Performed by: EMERGENCY MEDICINE

## 2020-12-21 PROCEDURE — 84484 ASSAY OF TROPONIN QUANT: CPT | Performed by: EMERGENCY MEDICINE

## 2020-12-21 PROCEDURE — 82948 REAGENT STRIP/BLOOD GLUCOSE: CPT | Performed by: NURSE PRACTITIONER

## 2020-12-21 PROCEDURE — 85025 COMPLETE CBC W/AUTO DIFF WBC: CPT | Performed by: EMERGENCY MEDICINE

## 2020-12-21 PROCEDURE — 99213 OFFICE O/P EST LOW 20 MIN: CPT | Performed by: NURSE PRACTITIONER

## 2020-12-21 PROCEDURE — 85610 PROTHROMBIN TIME: CPT | Performed by: EMERGENCY MEDICINE

## 2020-12-21 PROCEDURE — S9088 SERVICES PROVIDED IN URGENT: HCPCS | Performed by: NURSE PRACTITIONER

## 2020-12-21 PROCEDURE — 93005 ELECTROCARDIOGRAM TRACING: CPT

## 2020-12-21 PROCEDURE — 84478 ASSAY OF TRIGLYCERIDES: CPT | Performed by: EMERGENCY MEDICINE

## 2020-12-21 RX ORDER — SODIUM CHLORIDE 9 MG/ML
100 INJECTION, SOLUTION INTRAVENOUS CONTINUOUS
Status: DISCONTINUED | OUTPATIENT
Start: 2020-12-21 | End: 2020-12-22 | Stop reason: HOSPADM

## 2020-12-21 RX ORDER — MELATONIN
2000 DAILY
Status: DISCONTINUED | OUTPATIENT
Start: 2020-12-22 | End: 2020-12-22 | Stop reason: HOSPADM

## 2020-12-21 RX ORDER — ONDANSETRON 2 MG/ML
4 INJECTION INTRAMUSCULAR; INTRAVENOUS EVERY 6 HOURS PRN
Status: DISCONTINUED | OUTPATIENT
Start: 2020-12-21 | End: 2020-12-22 | Stop reason: HOSPADM

## 2020-12-21 RX ORDER — MULTIVITAMIN/IRON/FOLIC ACID 18MG-0.4MG
1 TABLET ORAL DAILY
Status: DISCONTINUED | OUTPATIENT
Start: 2020-12-29 | End: 2020-12-22 | Stop reason: HOSPADM

## 2020-12-21 RX ORDER — ASCORBIC ACID 500 MG
1000 TABLET ORAL EVERY 12 HOURS SCHEDULED
Status: DISCONTINUED | OUTPATIENT
Start: 2020-12-21 | End: 2020-12-22 | Stop reason: HOSPADM

## 2020-12-21 RX ORDER — FAMOTIDINE 20 MG/1
20 TABLET, FILM COATED ORAL DAILY
Status: DISCONTINUED | OUTPATIENT
Start: 2020-12-22 | End: 2020-12-22 | Stop reason: HOSPADM

## 2020-12-21 RX ORDER — ACETAMINOPHEN 325 MG/1
650 TABLET ORAL EVERY 6 HOURS PRN
Status: DISCONTINUED | OUTPATIENT
Start: 2020-12-21 | End: 2020-12-22 | Stop reason: HOSPADM

## 2020-12-21 RX ORDER — ZINC SULFATE 50(220)MG
220 CAPSULE ORAL DAILY
Status: DISCONTINUED | OUTPATIENT
Start: 2020-12-22 | End: 2020-12-22 | Stop reason: HOSPADM

## 2020-12-21 RX ORDER — INSULIN ASPART 100 [IU]/ML
45 INJECTION, SUSPENSION SUBCUTANEOUS
Status: DISCONTINUED | OUTPATIENT
Start: 2020-12-22 | End: 2020-12-22 | Stop reason: HOSPADM

## 2020-12-21 RX ADMIN — SODIUM CHLORIDE 1000 ML: 0.9 INJECTION, SOLUTION INTRAVENOUS at 20:15

## 2020-12-22 VITALS
DIASTOLIC BLOOD PRESSURE: 58 MMHG | TEMPERATURE: 98 F | RESPIRATION RATE: 18 BRPM | OXYGEN SATURATION: 96 % | HEIGHT: 65 IN | BODY MASS INDEX: 34.01 KG/M2 | HEART RATE: 70 BPM | SYSTOLIC BLOOD PRESSURE: 130 MMHG | WEIGHT: 204.15 LBS

## 2020-12-22 PROBLEM — Z53.29 LEFT AGAINST MEDICAL ADVICE: Status: ACTIVE | Noted: 2020-12-22

## 2020-12-22 PROBLEM — N28.9 RENAL INSUFFICIENCY: Status: ACTIVE | Noted: 2020-12-21

## 2020-12-22 LAB
ANION GAP SERPL CALCULATED.3IONS-SCNC: 8 MMOL/L (ref 4–13)
ATRIAL RATE: 75 BPM
ATRIAL RATE: 76 BPM
ATRIAL RATE: 80 BPM
BASOPHILS # BLD AUTO: 0 THOUSANDS/ΜL (ref 0–0.1)
BASOPHILS # BLD AUTO: 0 THOUSANDS/ΜL (ref 0–0.1)
BASOPHILS NFR BLD AUTO: 1 % (ref 0–2)
BASOPHILS NFR BLD AUTO: 1 % (ref 0–2)
BUN SERPL-MCNC: 19 MG/DL (ref 7–25)
CALCIUM SERPL-MCNC: 8.4 MG/DL (ref 8.6–10.5)
CHLORIDE SERPL-SCNC: 104 MMOL/L (ref 98–107)
CHOLEST SERPL-MCNC: 81 MG/DL (ref 0–200)
CO2 SERPL-SCNC: 25 MMOL/L (ref 21–31)
CREAT SERPL-MCNC: 0.91 MG/DL (ref 0.6–1.2)
EOSINOPHIL # BLD AUTO: 0.1 THOUSAND/ΜL (ref 0–0.61)
EOSINOPHIL # BLD AUTO: 0.1 THOUSAND/ΜL (ref 0–0.61)
EOSINOPHIL NFR BLD AUTO: 2 % (ref 0–5)
EOSINOPHIL NFR BLD AUTO: 2 % (ref 0–5)
ERYTHROCYTE [DISTWIDTH] IN BLOOD BY AUTOMATED COUNT: 14 % (ref 11.5–14.5)
ERYTHROCYTE [DISTWIDTH] IN BLOOD BY AUTOMATED COUNT: 14.4 % (ref 11.5–14.5)
EST. AVERAGE GLUCOSE BLD GHB EST-MCNC: 292 MG/DL
FERRITIN SERPL-MCNC: 48 NG/ML (ref 8–388)
GFR SERPL CREATININE-BSD FRML MDRD: 63 ML/MIN/1.73SQ M
GLUCOSE SERPL-MCNC: 166 MG/DL (ref 65–140)
GLUCOSE SERPL-MCNC: 167 MG/DL (ref 65–140)
GLUCOSE SERPL-MCNC: 178 MG/DL (ref 65–99)
GLUCOSE SERPL-MCNC: 183 MG/DL (ref 65–140)
GLUCOSE SERPL-MCNC: 247 MG/DL (ref 65–140)
HBA1C MFR BLD: 11.8 %
HCT VFR BLD AUTO: 35.7 % (ref 42–47)
HCT VFR BLD AUTO: 36.3 % (ref 42–47)
HDLC SERPL-MCNC: 13 MG/DL
HGB BLD-MCNC: 12.1 G/DL (ref 12–16)
HGB BLD-MCNC: 12.1 G/DL (ref 12–16)
INR PPP: 1.17 (ref 0.84–1.19)
LDLC SERPL CALC-MCNC: 32 MG/DL (ref 0–100)
LYMPHOCYTES # BLD AUTO: 1.4 THOUSANDS/ΜL (ref 0.6–4.47)
LYMPHOCYTES # BLD AUTO: 1.7 THOUSANDS/ΜL (ref 0.6–4.47)
LYMPHOCYTES NFR BLD AUTO: 43 % (ref 21–51)
LYMPHOCYTES NFR BLD AUTO: 46 % (ref 21–51)
MAGNESIUM SERPL-MCNC: 1.5 MG/DL (ref 1.9–2.7)
MCH RBC QN AUTO: 28.3 PG (ref 26–34)
MCH RBC QN AUTO: 28.6 PG (ref 26–34)
MCHC RBC AUTO-ENTMCNC: 33.3 G/DL (ref 31–37)
MCHC RBC AUTO-ENTMCNC: 33.9 G/DL (ref 31–37)
MCV RBC AUTO: 84 FL (ref 81–99)
MCV RBC AUTO: 85 FL (ref 81–99)
MONOCYTES # BLD AUTO: 0.3 THOUSAND/ΜL (ref 0.17–1.22)
MONOCYTES # BLD AUTO: 0.3 THOUSAND/ΜL (ref 0.17–1.22)
MONOCYTES NFR BLD AUTO: 8 % (ref 2–12)
MONOCYTES NFR BLD AUTO: 8 % (ref 2–12)
NEUTROPHILS # BLD AUTO: 1.6 THOUSANDS/ΜL (ref 1.4–6.5)
NEUTROPHILS # BLD AUTO: 1.6 THOUSANDS/ΜL (ref 1.4–6.5)
NEUTS SEG NFR BLD AUTO: 43 % (ref 42–75)
NEUTS SEG NFR BLD AUTO: 47 % (ref 42–75)
P AXIS: 26 DEGREES
P AXIS: 31 DEGREES
P AXIS: 34 DEGREES
PLATELET # BLD AUTO: 244 THOUSANDS/UL (ref 149–390)
PLATELET # BLD AUTO: 247 THOUSANDS/UL (ref 149–390)
PLATELET # BLD AUTO: 250 THOUSANDS/UL (ref 149–390)
PMV BLD AUTO: 7.4 FL (ref 8.6–11.7)
PMV BLD AUTO: 7.6 FL (ref 8.6–11.7)
POTASSIUM SERPL-SCNC: 3.5 MMOL/L (ref 3.5–5.5)
PR INTERVAL: 160 MS
PR INTERVAL: 170 MS
PR INTERVAL: 186 MS
PROCALCITONIN SERPL-MCNC: 0.09 NG/ML
PROTHROMBIN TIME: 14.8 SECONDS (ref 11.6–14.5)
QRS AXIS: -56 DEGREES
QRS AXIS: -60 DEGREES
QRS AXIS: -64 DEGREES
QRSD INTERVAL: 136 MS
QRSD INTERVAL: 140 MS
QRSD INTERVAL: 142 MS
QT INTERVAL: 404 MS
QT INTERVAL: 406 MS
QT INTERVAL: 412 MS
QTC INTERVAL: 451 MS
QTC INTERVAL: 463 MS
QTC INTERVAL: 468 MS
RBC # BLD AUTO: 4.24 MILLION/UL (ref 3.9–5.2)
RBC # BLD AUTO: 4.27 MILLION/UL (ref 3.9–5.2)
SODIUM SERPL-SCNC: 137 MMOL/L (ref 134–143)
T WAVE AXIS: 102 DEGREES
T WAVE AXIS: 77 DEGREES
T WAVE AXIS: 99 DEGREES
TRIGL SERPL-MCNC: 180 MG/DL (ref 44–166)
TROPONIN I SERPL-MCNC: <0.03 NG/ML
TROPONIN I SERPL-MCNC: <0.03 NG/ML
VENTRICULAR RATE: 75 BPM
VENTRICULAR RATE: 76 BPM
VENTRICULAR RATE: 80 BPM
WBC # BLD AUTO: 3.4 THOUSAND/UL (ref 4.8–10.8)
WBC # BLD AUTO: 3.7 THOUSAND/UL (ref 4.8–10.8)

## 2020-12-22 PROCEDURE — 80048 BASIC METABOLIC PNL TOTAL CA: CPT | Performed by: NURSE PRACTITIONER

## 2020-12-22 PROCEDURE — 99222 1ST HOSP IP/OBS MODERATE 55: CPT | Performed by: PHYSICIAN ASSISTANT

## 2020-12-22 PROCEDURE — 93005 ELECTROCARDIOGRAM TRACING: CPT

## 2020-12-22 PROCEDURE — 84484 ASSAY OF TROPONIN QUANT: CPT | Performed by: NURSE PRACTITIONER

## 2020-12-22 PROCEDURE — 99239 HOSP IP/OBS DSCHRG MGMT >30: CPT | Performed by: HOSPITALIST

## 2020-12-22 PROCEDURE — 93010 ELECTROCARDIOGRAM REPORT: CPT | Performed by: INTERNAL MEDICINE

## 2020-12-22 PROCEDURE — 99222 1ST HOSP IP/OBS MODERATE 55: CPT | Performed by: NURSE PRACTITIONER

## 2020-12-22 PROCEDURE — 99222 1ST HOSP IP/OBS MODERATE 55: CPT | Performed by: INTERNAL MEDICINE

## 2020-12-22 PROCEDURE — 85610 PROTHROMBIN TIME: CPT | Performed by: NURSE PRACTITIONER

## 2020-12-22 PROCEDURE — 83735 ASSAY OF MAGNESIUM: CPT | Performed by: NURSE PRACTITIONER

## 2020-12-22 PROCEDURE — 97161 PT EVAL LOW COMPLEX 20 MIN: CPT

## 2020-12-22 PROCEDURE — 85025 COMPLETE CBC W/AUTO DIFF WBC: CPT | Performed by: NURSE PRACTITIONER

## 2020-12-22 PROCEDURE — 82948 REAGENT STRIP/BLOOD GLUCOSE: CPT

## 2020-12-22 PROCEDURE — 84484 ASSAY OF TROPONIN QUANT: CPT | Performed by: HOSPITALIST

## 2020-12-22 PROCEDURE — 97165 OT EVAL LOW COMPLEX 30 MIN: CPT

## 2020-12-22 PROCEDURE — 83036 HEMOGLOBIN GLYCOSYLATED A1C: CPT | Performed by: NURSE PRACTITIONER

## 2020-12-22 PROCEDURE — 85049 AUTOMATED PLATELET COUNT: CPT | Performed by: NURSE PRACTITIONER

## 2020-12-22 PROCEDURE — 80061 LIPID PANEL: CPT | Performed by: NURSE PRACTITIONER

## 2020-12-22 RX ORDER — MAGNESIUM SULFATE HEPTAHYDRATE 40 MG/ML
2 INJECTION, SOLUTION INTRAVENOUS ONCE
Status: DISCONTINUED | OUTPATIENT
Start: 2020-12-22 | End: 2020-12-22 | Stop reason: HOSPADM

## 2020-12-22 RX ORDER — POTASSIUM CHLORIDE 20 MEQ/1
20 TABLET, EXTENDED RELEASE ORAL ONCE
Status: DISCONTINUED | OUTPATIENT
Start: 2020-12-22 | End: 2020-12-22 | Stop reason: HOSPADM

## 2020-12-22 RX ADMIN — FAMOTIDINE 20 MG: 20 TABLET, FILM COATED ORAL at 09:15

## 2020-12-22 RX ADMIN — Medication 2000 UNITS: at 09:15

## 2020-12-22 RX ADMIN — OXYCODONE HYDROCHLORIDE AND ACETAMINOPHEN 1000 MG: 500 TABLET ORAL at 00:35

## 2020-12-22 RX ADMIN — SODIUM CHLORIDE 100 ML/HR: 0.9 INJECTION, SOLUTION INTRAVENOUS at 00:23

## 2020-12-22 RX ADMIN — INSULIN LISPRO 2 UNITS: 100 INJECTION, SOLUTION INTRAVENOUS; SUBCUTANEOUS at 11:49

## 2020-12-22 RX ADMIN — INSULIN LISPRO 1 UNITS: 100 INJECTION, SOLUTION INTRAVENOUS; SUBCUTANEOUS at 01:15

## 2020-12-22 RX ADMIN — INSULIN ASPART 45 UNITS: 100 INJECTION, SUSPENSION SUBCUTANEOUS at 09:15

## 2020-12-22 RX ADMIN — INSULIN LISPRO 1 UNITS: 100 INJECTION, SOLUTION INTRAVENOUS; SUBCUTANEOUS at 09:15

## 2020-12-22 RX ADMIN — ZINC SULFATE 220 MG (50 MG) CAPSULE 220 MG: CAPSULE at 09:15

## 2020-12-22 NOTE — PLAN OF CARE
Problem: DISCHARGE PLANNING  Goal: Discharge to home or other facility with appropriate resources  Description: INTERVENTIONS:  - Identify barriers to discharge w/patient and caregiver  - Arrange for needed discharge resources and transportation as appropriate  - Identify discharge learning needs (meds, wound care, etc )  - Refer to Case Management Department for coordinating discharge planning if the patient needs post-hospital services based on physician/advanced practitioner order or complex needs related to functional status, cognitive ability, or social support system  Outcome: Progressing     Problem: CARDIOVASCULAR - ADULT  Goal: Maintains optimal cardiac output and hemodynamic stability  Description: INTERVENTIONS:  - Monitor I/O, vital signs and rhythm  - Monitor for S/S and trends of decreased cardiac output  - Administer and titrate ordered vasoactive medications to optimize hemodynamic stability  - Assess quality of pulses, skin color and temperature  - Assess for signs of decreased coronary artery perfusion  - Instruct patient to report change in severity of symptoms  Outcome: Progressing  Goal: Absence of cardiac dysrhythmias or at baseline rhythm  Description: INTERVENTIONS:  - Continuous cardiac monitoring, vital signs, obtain 12 lead EKG if ordered  - Administer antiarrhythmic and heart rate control medications as ordered  - Monitor electrolytes and administer replacement therapy as ordered  Outcome: Progressing     Problem: GENITOURINARY - ADULT  Goal: Maintains or returns to baseline urinary function  Description: INTERVENTIONS:  - Assess urinary function  - Encourage oral fluids to ensure adequate hydration if ordered  - Administer IV fluids as ordered to ensure adequate hydration  - Administer ordered medications as needed  - Offer frequent toileting  - Follow urinary retention protocol if ordered  Outcome: Progressing     Problem: INFECTION - ADULT  Goal: Absence or prevention of progression during hospitalization  Description: INTERVENTIONS:  - Assess and monitor for signs and symptoms of infection  - Monitor lab/diagnostic results  - Monitor all insertion sites, i e  indwelling lines, tubes, and drains  - Monitor endotracheal if appropriate and nasal secretions for changes in amount and color  - [place] appropriate cooling/warming therapies per order  - Administer medications as ordered  - Instruct and encourage patient and family to use good hand hygiene technique  - Identify and instruct in appropriate isolation precautions for identified infection/condition  Outcome: Progressing

## 2020-12-22 NOTE — NURSING NOTE
Patient requested to sign out AMA  Physician made aware  Proper documentation collected  IV removed with catheter tip intact, DSD and pressure applied

## 2020-12-22 NOTE — UTILIZATION REVIEW
Notification of Inpatient Admission/Inpatient Authorization Request   This is a Notification of Inpatient Admission for [address]  Be advised that this patient was admitted to our facility under Inpatient Status  Contact Destinee Martel at [phone] for additional admission information  [facility] DEPT  DEDICATED [phone]  Patient Name:   Domingo Christian   YOB: 1950       [address]   [address]:   [address]  Tax ID: [id number]  NPI: [id number] Attending Provider/NPI:  Phone:  Address: Edgar Zaman [[id number]]  [phone]  Same as Facility   Place of Service Code: 24 Place of Service Name: [address] [place] [address]   Start Date: 12/21/20 2116 Discharge Date & Time: 12/22/2020 12:27 PM    Type of Admission: Inpatient Status Discharge Disposition   (if discharged): Left against medical advice or discontinued care   Patient Diagnoses: Syncope and collapse [R55]  Dehydration [E86 0]  Syncope [R55]  Acute kidney injury ([facility] [address] ) [N17 9]  Fracture of right humerus with routine healing [S42 301D]  COVID-19 [U07 1]     Orders: Admission Orders (From admission, onward)     Ordered        12/21/20 2116  Inpatient Admission  Once                    Assigned Utilization Review Contact: John Hernandez  Utilization   Network Utilization Review Department  Phone: [phone]; Fax [phone]  Email: [email or web address]   ATTENTION PAYERS: Please call the assigned Utilization  directly with any questions or concerns ALL voicemails in the department are confidential  Send all requests for admission clinical reviews, approved or denied determinations and any other requests to dedicated fax number belonging to the campus where the patient is receiving treatment

## 2020-12-22 NOTE — CONSULTS
CONSULTATION-NEPHROLOGY   Wataga Burn 65 y o  female MRN: [id number]  Unit/Bed#: -02 Encounter: [id number]        Assessment and Plan:    1  Acute kidney injury (POA) atop chronic kidney disease-resolved  · Etiology prerenal azotemia in the setting of volume depletion from dehydration, diuretic use and possibly failure to autoregulate in a setting of lisinopril use  Renal function has returned to baseline with volume expansion  · Would continue to hold the diuretic until diarrhea has resolved  She can restart lisinopril and metformin once discharged  She should have outpatient blood work done 3-5 days post discharge  Will be happy to see in office  She is at risk for renal progression due to uncontrolled diabetes mellitus type 2 and her urine protein study should be followed closely  2  Stage 2 chronic kidney disease with baseline creatinine around 0 8-0 9 mg/dL  · Etiology most likely diabetic nephropathy and age-related nephron loss  Will be happy to see in the office  3  Uncontrolled diabetes mellitus type 2  · With an A1c of 13%  Needs better blood sugar control  Will benefit from endocrinology consultation as an outpatient  She remains on lisinopril for renal protection which is temporarily on hold  4  Volume depletion  · Appears nearly resolved  She states she has a good appetite today  Again, would hold off on diuretic reinitiation until diarrhea has resolved  5  COVID-19 positive  · Not requiring any oxygen at this time and is being treated on mild pathway  6  Syncope and collapse  · Appears to be volume mediated  A cardiology consultation was requested per primary team   7  Hypomagnesemia  · Will provide 2 g IV magnesium  8  Hypokalemia  · Will provide 20 mEq oral potassium  9   Right humeral fracture      HPI:    Newton Burn is a 65 y o  female with an active problem list significant for uncontrolled T2DM with A1C 13%, hypertension, CAD, hyperlipidemia, who initially presented to urgent care 12/21 with chief complaint of dizziness and syncopal episode  Upon arrival to Urgent Care her blood pressures were low with systolics in the 17B  She was then sent to the emergency department  She would Needs tested for COVID-19 and was found to be positive  Was also found to have an acute kidney injury upon presentation to the emergency department  She was initiated on volume expansion with normal saline and admitted to the medical-surgical floor  Today renal function has returned to baseline  Upon discussion with the patient, she relays HPI as above in addition to having diarrhea for several days prior to presentation and also taking her antihypertensives and diuretics up until day of admission  From a renal standpoint, appears to have stage 2 chronic kidney disease with a baseline creatinine around 0 8-0 9 mg/dL  She does have uncontrolled type 2 diabetes mellitus  No urine protein quantification studies available  Is on lisinopril as an outpatient for renal protection  Has never seen a nephrologist before  Denies NSAID use  She is at risk for renal progression due to uncontrolled diabetes mellitus 2  Will be happy to see in the office  The medical record, including Care Everywhere and media tabs were reviewed  Reason for Consult: BIRGIT     Review of Systems:  A complete 10-point review of systems was performed  Aside from what was mentioned in the HPI, it is otherwise negative        Historical Information   Past Medical History:   Diagnosis Date    Coronary artery disease     Diabetes mellitus ([address] )     Hypertension      Past Surgical History:   Procedure Laterality Date    APPENDECTOMY      CHOLECYSTECTOMY      EYE SURGERY Left     LEG SURGERY Right     vein    TONSILLECTOMY      VEIN SURGERY      WRIST SURGERY Right     ganglion cyst     Social History   Social History     Substance and Sexual Activity   Alcohol Use Not Currently     Social History     Substance and Sexual Activity   Drug Use Never     Social History     Tobacco Use   Smoking Status Former Smoker    Packs/day: 1 00    Years: 3 00    Pack years: 3 00    Types: Cigarettes    Quit date: 65    Years since quittin 0   Smokeless Tobacco Never Used       Family History:   Family History   Problem Relation Age of Onset    Cancer Mother     Diabetes Father     Hypertension Father     Heart attack Father        Medications:  Pertinent medications were reviewed  Current Facility-Administered Medications   Medication Dose Route Frequency Provider Last Rate    acetaminophen  650 mg Oral Q6H PRN Na A Meckes, CRNP      ascorbic acid  1,000 mg Oral Q12H [address] Na A Meckes, CRNP      cholecalciferol  2,000 Units Oral Daily Na A Meckes, CRNP      enoxaparin  40 mg Subcutaneous Daily Na A Meckes, CRNP      famotidine  20 mg Oral Daily An A Meckes, CRNP      insulin aspart protamine-insulin aspart  45 Units Subcutaneous BID AC Na A Meckes, CRNP      insulin lispro  1-5 Units Subcutaneous TID AC Na A Meckes, CRNP      insulin lispro  1-5 Units Subcutaneous HS Na A Meckes, CRNP      zinc sulfate  220 mg Oral Daily Na A Meckes, CRNP      Followed by   Krysta Aguirre ON 2020] multivitamin-minerals  1 tablet Oral Daily Na A Meckes, CRNP      ondansetron  4 mg Intravenous Q6H PRN Na A Meckes, CRNP      sodium chloride  100 mL/hr Intravenous Continuous Na A Meckes, CRNP 100 mL/hr (20 0023)         Allergies   Allergen Reactions    Ciprofloxacin     Other      Nitro TD Patch    Simvastatin Hives         Vitals:   /58 (BP Location: Right arm)   Pulse 70   Temp 98 °F (36 7 °C) (Temporal)   Resp 18   Ht 5' 5" (1 651 m)   Wt 92 6 kg (204 lb 2 3 oz)   SpO2 96%   BMI 33 97 kg/m²   Body mass index is 33 97 kg/m²    SpO2: 96 %,   SpO2 Activity: At Rest,   O2 Device: None (Room air)      Intake/Output Summary (Last 24 hours) at 2020 0823  Last data filed at 12/21/2020 2227  Gross per 24 hour   Intake 1000 ml   Output --   Net 1000 ml     Invasive Devices     Peripheral Intravenous Line            Peripheral IV 12/21/20 Left Antecubital less than 1 day                Physical Exam:  General: conscious, cooperative, in no acute distress  Eyes: conjunctivae pink, anicteric sclerae  ENT: lips and mucous membranes moist  Neck: supple, no JVD, no masses  Chest: diminished  breath sounds bilateral, no crackles, ronchus or wheezings  CVS: S1 & S2, normal rate, regular rhythm  Abdomen: soft, non-tender, non-distended, normoactive bowel sounds, obese  Extremities: no edema of both legs  Skin: no rash  Neuro: awake, alert, oriented      Diagnostic Data:  Lab: I have personally reviewed pertinent lab results  ,   CBC:  Results from last 7 days   Lab Units 12/22/20  0805   WBC Thousand/uL 3 70*   HEMOGLOBIN g/dL 12 1   HEMATOCRIT % 36 3*   PLATELETS Thousands/uL 247      CMP:   Lab Results   Component Value Date    SODIUM 137 12/22/2020    K 3 5 12/22/2020     12/22/2020    CO2 25 12/22/2020    BUN 19 12/22/2020    CREATININE 0 91 12/22/2020    CALCIUM 8 4 (L) 12/22/2020    AST 9 (L) 12/21/2020    ALT 11 12/21/2020    ALKPHOS 41 (L) 12/21/2020    EGFR 63 12/22/2020   ,   PT/INR:   Lab Results   Component Value Date    INR 1 17 12/22/2020   ,   Magnesium: No components found for: MAG,  Phosphorous: No results found for: PHOS    Microbiology:  @LAB[facility],(urinecx:7)@        [facility]JOSELUIS Hernandez    Portions of the record may have been created with voice recognition software  Occasional wrong word or "sound a like" substitutions may have occurred due to the inherent limitations of voice recognition software  Read the chart carefully and recognize, using context, where substitutions have occurred

## 2020-12-22 NOTE — ASSESSMENT & PLAN NOTE
· While at the store  · Initially thought to be vasovagal  · Patient sent to urgent care with blood pressures in the low 80s  · Blood pressure remains 98/53, 100/59  · Neuro checks q 4 hours  · Cardiology consulted  · Continue normal saline solution at 100 mL/hour for the next 24 hours  · GERARDO = 3  · Monitor on telemetry  · Trend troponins  · CT of the head is pending

## 2020-12-22 NOTE — ASSESSMENT & PLAN NOTE
Likely related to volume depletion (diarrhea, diuretic use)  Blood pressures improved following fluid resuscitation  Troponin neg x 3  Recommend holding diuretics until diarrhea resolves  Echo for completeness once recovered from Covid-19 infection

## 2020-12-22 NOTE — PLAN OF CARE
Problem: DISCHARGE PLANNING  Goal: Discharge to home or other facility with appropriate resources  Description: INTERVENTIONS:  - Identify barriers to discharge w/patient and caregiver  - Arrange for needed discharge resources and transportation as appropriate  - Identify discharge learning needs (meds, wound care, etc )  - Refer to Case Management Department for coordinating discharge planning if the patient needs post-hospital services based on physician/advanced practitioner order or complex needs related to functional status, cognitive ability, or social support system  12/22/2020 1439 by Gavin Roach RN  Outcome: Adequate for Discharge  12/22/2020 1048 by Gavin Roach RN  Outcome: Progressing     Problem: CARDIOVASCULAR - ADULT  Goal: Maintains optimal cardiac output and hemodynamic stability  Description: INTERVENTIONS:  - Monitor I/O, vital signs and rhythm  - Monitor for S/S and trends of decreased cardiac output  - Administer and titrate ordered vasoactive medications to optimize hemodynamic stability  - Assess quality of pulses, skin color and temperature  - Assess for signs of decreased coronary artery perfusion  - Instruct patient to report change in severity of symptoms  12/22/2020 1439 by Gavin oRach RN  Outcome: Adequate for Discharge  12/22/2020 1048 by Gavin Roach RN  Outcome: Progressing  Goal: Absence of cardiac dysrhythmias or at baseline rhythm  Description: INTERVENTIONS:  - Continuous cardiac monitoring, vital signs, obtain 12 lead EKG if ordered  - Administer antiarrhythmic and heart rate control medications as ordered  - Monitor electrolytes and administer replacement therapy as ordered  12/22/2020 1439 by Gavin Roach RN  Outcome: Adequate for Discharge  12/22/2020 1048 by Gavin Roach RN  Outcome: Progressing     Problem: GENITOURINARY - ADULT  Goal: Maintains or returns to baseline urinary function  Description: INTERVENTIONS:  - Assess urinary function  - Encourage oral fluids to ensure adequate hydration if ordered  - Administer IV fluids as ordered to ensure adequate hydration  - Administer ordered medications as needed  - Offer frequent toileting  - Follow urinary retention protocol if ordered  12/22/2020 1439 by Tiarra Pittman RN  Outcome: Adequate for Discharge  12/22/2020 1048 by Tiarra Pittman RN  Outcome: Progressing     Problem: INFECTION - ADULT  Goal: Absence or prevention of progression during hospitalization  Description: INTERVENTIONS:  - Assess and monitor for signs and symptoms of infection  - Monitor lab/diagnostic results  - Monitor all insertion sites, i e  indwelling lines, tubes, and drains  - Monitor endotracheal if appropriate and nasal secretions for changes in amount and color  - [place] appropriate cooling/warming therapies per order  - Administer medications as ordered  - Instruct and encourage patient and family to use good hand hygiene technique  - Identify and instruct in appropriate isolation precautions for identified infection/condition  12/22/2020 1439 by Tiarra Pittman RN  Outcome: Adequate for Discharge  12/22/2020 1048 by Tiarra Pittman RN  Outcome: Progressing

## 2020-12-22 NOTE — CONSULTS
Consult- Kari Love 1950, 65 y o  female MRN: [id number]    Unit/Bed#: -02 Encounter: [id number]    Primary Care Provider: Tomas Martin DO   Date and time admitted to hospital: 12/21/2020  7:11 PM      Inpatient consult to Cardiology  Consult performed by: Blaise Jaime MD  Consult ordered by: JOSELUIS Ross          Renal insufficiency  Assessment & Plan  Improved  Hypotension, unspecified  Assessment & Plan  Will reinitiate meds for hypertension only if needed  Needed  Needed  COVID-19 virus infection  Assessment & Plan  Mild treatment path per medicine     * Syncope and collapse  Assessment & Plan  Likely related to volume depletion (diarrhea, diuretic use)  Blood pressures improved following fluid resuscitation  Troponin neg x 3  Recommend holding diuretics until diarrhea resolves  Echo for completeness once recovered from Covid-19 infection  Other summary comments:   See above  Will arrange echo for 3-4 weeks from now and follow-up if abnormal       HPI: Kari Love is a 65y o  year old female who presented with syncope  She describes several days of for appetite  She was in a store when she felt as if the room was spinning but she was also lightheaded  She fell to the floor and almost instantly came to  Initial blood pressure was low  Creatinine was moderately elevated as well  Blood pressure has come up and creatinine has fallen with volume  Although she did not feel ill it turns out she is COVID positive  Patient denies recent change in exertional capacity  She denies a history of chest pain or prior syncope  EKG:   Sinus rhythm  Incomplete left bundle branch block  MOST  RECENT CARDIAC IMAGING:   None      Review of Systems: a 10 point review of systems was conducted and is negative except for as mentioned in the HPI or as below          Historical Information   Past Medical History:   Diagnosis Date    Coronary artery disease     Diabetes mellitus ([facility] [address] )     Hypertension      Past Surgical History:   Procedure Laterality Date    APPENDECTOMY      CHOLECYSTECTOMY      EYE SURGERY Left     LEG SURGERY Right     vein    TONSILLECTOMY      VEIN SURGERY      WRIST SURGERY Right     ganglion cyst     Social History     Substance and Sexual Activity   Alcohol Use Not Currently     Social History     Substance and Sexual Activity   Drug Use Never     Social History     Tobacco Use   Smoking Status Former Smoker    Packs/day: 1 00    Years: 3 00    Pack years: 3 00    Types: Cigarettes    Quit date: 65    Years since quittin 0   Smokeless Tobacco Never Used       Family History:   Negative for early CAD  Meds/Allergies   all current active meds have been reviewed  Medications Prior to Admission   Medication    amLODIPine (NORVASC) 5 mg tablet    atorvastatin (LIPITOR) 40 mg tablet    insulin NPH-insulin regular (NovoLIN 70/30 ReliOn) 100 units/mL subcutaneous injection    Insulin Pen Needle 31G X 6 MM MISC    lisinopril-hydrochlorothiazide (PRINZIDE,ZESTORETIC) 20-12 5 MG per tablet    metFORMIN (GLUCOPHAGE-XR) 500 mg 24 hr tablet    ReliOn Ultra Thin Lancets 30G MISC    calcium carbonate-vitamin D (OSCAL-D) 500 mg-200 units per tablet    lisinopril (ZESTRIL) 20 mg tablet    mupirocin (BACTROBAN) 2 % ointment    sertraline (ZOLOFT) 25 mg tablet       Allergies   Allergen Reactions    Ciprofloxacin     Other      Nitro TD Patch    Simvastatin Hives       Objective   Vitals: Blood pressure 130/58, pulse 70, temperature 98 °F (36 7 °C), temperature source Temporal, resp  rate 18, height 5' 5" (1 651 m), weight 92 6 kg (204 lb 2 3 oz), SpO2 96 %  , Body mass index is 33 97 kg/m² ,   Orthostatic Blood Pressures      Most Recent Value   Blood Pressure  130/58 filed at 2020 0719   Patient Position - Orthostatic VS  Lying filed at 2020 2609          Systolic (09PGX), DGO:350 , Min:98 , DWI:220     Diastolic (24hrs), Av, Min:53, Max:97              Physical Exam:    General:  Normal appearance in no distress  Eyes:  Anicteric  Oral mucosa:  Moist   Neck:  No JVD  Carotid upstrokes are brisk without bruits  No masses  Chest:  Difficult to hear with PAPR noise  Cardiac:  No palpable PMI  Difficult to hear with PAPR noise  Abdomen:  Soft and nontender  No palpable organomegaly or aortic enlargement  Extremities:  No peripheral edema  Musculoskeletal:  Symmetric  Vascular:  Femoral pulses are brisk without bruits  Popliteal  pulses are intact bilaterally  Pedal pulses are intact  Neuro:  Grossly symmetric  Psych:  Alert and oriented x3          Lab Results:     Troponins:   Results from last 7 days   Lab Units 20  0544 20  0126 20   CK TOTAL U/L  --   --  17*   TROPONIN I ng/mL <0 03 <0 03 <0 03     BNP:   Results from last 6 Months   Lab Units 20  2132   BNP pg/mL 26       CBC :   Results from last 7 days   Lab Units 20  0805 20  0544   WBC Thousand/uL 3 70* 3 40*   HEMOGLOBIN g/dL 12 1 12 1   HEMATOCRIT % 36 3* 35 7*   MCV fL 85 84   PLATELETS Thousands/uL 247 250     TSH:     CMP:   Results from last 7 days   Lab Units 20  0805 20   POTASSIUM mmol/L 3 5 4 0   CHLORIDE mmol/L 104 101   CO2 mmol/L 25 27   BUN mg/dL 19 24   CREATININE mg/dL 0 91 1 56*   AST U/L  --  9*   ALT U/L  --  11   EGFR ml/min/1 73sq m 63 33     Lipid Profile:   Results from last 7 days   Lab Units 20  0544 20   TRIGLYCERIDES mg/dL 180* 216*   HDL mg/dL 13*  --      Coags:   Results from last 7 days   Lab Units 20  0545 20  2132   INR  1 17 1 12

## 2020-12-22 NOTE — PLAN OF CARE
Problem: DISCHARGE PLANNING  Goal: Discharge to home or other facility with appropriate resources  Description: INTERVENTIONS:  - Identify barriers to discharge w/patient and caregiver  - Arrange for needed discharge resources and transportation as appropriate  - Identify discharge learning needs (meds, wound care, etc )  - Refer to Case Management Department for coordinating discharge planning if the patient needs post-hospital services based on physician/advanced practitioner order or complex needs related to functional status, cognitive ability, or social support system  Outcome: Progressing     Problem: CARDIOVASCULAR - ADULT  Goal: Maintains optimal cardiac output and hemodynamic stability  Description: INTERVENTIONS:  - Monitor I/O, vital signs and rhythm  - Monitor for S/S and trends of decreased cardiac output  - Administer and titrate ordered vasoactive medications to optimize hemodynamic stability  - Assess quality of pulses, skin color and temperature  - Assess for signs of decreased coronary artery perfusion  - Instruct patient to report change in severity of symptoms  Outcome: Progressing  Goal: Absence of cardiac dysrhythmias or at baseline rhythm  Description: INTERVENTIONS:  - Continuous cardiac monitoring, vital signs, obtain 12 lead EKG if ordered  - Administer antiarrhythmic and heart rate control medications as ordered  - Monitor electrolytes and administer replacement therapy as ordered  Outcome: Progressing     Problem: GENITOURINARY - ADULT  Goal: Maintains or returns to baseline urinary function  Description: INTERVENTIONS:  - Assess urinary function  - Encourage oral fluids to ensure adequate hydration if ordered  - Administer IV fluids as ordered to ensure adequate hydration  - Administer ordered medications as needed  - Offer frequent toileting  - Follow urinary retention protocol if ordered  Outcome: Progressing     Problem: INFECTION - ADULT  Goal: Absence or prevention of progression during hospitalization  Description: INTERVENTIONS:  - Assess and monitor for signs and symptoms of infection  - Monitor lab/diagnostic results  - Monitor all insertion sites, i e  indwelling lines, tubes, and drains  - Monitor endotracheal if appropriate and nasal secretions for changes in amount and color  - [place] appropriate cooling/warming therapies per order  - Administer medications as ordered  - Instruct and encourage patient and family to use good hand hygiene technique  - Identify and instruct in appropriate isolation precautions for identified infection/condition  Outcome: Progressing

## 2020-12-22 NOTE — H&P
H&P- Starr Oh 1950, 65 y o  female MRN: [id number]    Unit/Bed#: -02 Encounter: [id number]    Primary Care Provider: Tomas Martin DO   Date and time admitted to hospital: 12/21/2020  7:11 PM        * Syncope and collapse  Assessment & Plan  · While at the store  · Initially thought to be vasovagal  · Patient sent to urgent care with blood pressures in the low 80s  · Blood pressure remains 98/53, 100/59  · Neuro checks q 4 hours  · Cardiology consulted  · Continue normal saline solution at 100 mL/hour for the next 24 hours  · GERARDO = 3  · Monitor on telemetry  · Trend troponins  · CT of the head is pending    Hypotension, unspecified  Assessment & Plan  · Blood pressures in the ED:  98/53, 100/59  · Hold all blood pressure medications including Norvasc, lisinopril, hydrochlorothiazide  · Cardiology is consulted  · Monitor blood pressures  · Fall precautions  · Orthostatic blood pressures    COVID-19 virus infection  Assessment & Plan  · Positive at this time  · Continue with mild treatment algorithm  · If patient requires oxygen will then start Decadron, Pepcid, remdesivir    Acute kidney injury due to COVID-19 [facility])  Assessment & Plan  · Baseline creatinine of 0 76 on 06/20/2020  · Creatinine on admission 1 56  · Nephrology on consult  · Normal saline solution at 100 mL/hr for the next 24 hours  · Avoid nephrotoxic medications  · Monitor labs    Uncontrolled type 2 diabetes mellitus with hyperglycemia ([facility] [address] )  Assessment & Plan  Lab Results   Component Value Date    HGBA1C 13 0 (H) 06/17/2020       No results for input(s): POCGLU in the last 72 hours  Blood Sugar Average: Last 72 hrs:  ·  Accu-Cheks Q a c   And HS with sliding scale insulin  · Check hemoglobin A1c  · Continue NPH  · Hold metformin    Fracture of right humerus with routine healing  Assessment & Plan  · 06/12/2020, patient had initial incident with right humerus fracture  · Orthopedics consulted    VTE Prophylaxis: Enoxaparin (Lovenox)  Code Status:  Full code  Discussion with family:  Patient    Anticipated Length of Stay:  Patient will be admitted on an Inpatient basis with an anticipated length of stay of  > 2 midnights  Justification for Hospital Stay:  COVID-19 infection, syncope with collapse, new acute kidney injury    Total Time for Visit, including Counseling / Coordination of Care: 45 minutes  Greater than 50% of this total time spent on direct patient counseling and coordination of care  Chief Complaint:   Hot flash followed by syncope at the grocery store    History of Present Illness:    Domingo Christian is a 65 y o  female who presents with syncope at the grocery store after having a hot flash  Patient did state that she had felt lightheaded and fell down  Was taken to the urgent care due to hypotension and then was sent to the emergency department as her blood pressure was 82/52 at the urgent care  In the emergency department the patient's blood pressure remained 98/53, 100/59  Patient was found to be COVID positive  Patient also has chronic right arm pain secondary to a fracture that occurred in June 12, 2020  Patient does continue to have complaints of lightheadedness  Review of Systems:    Review of Systems   Constitutional: Positive for fatigue  Past Medical and Surgical History:     Past Medical History:   Diagnosis Date    Coronary artery disease     Diabetes mellitus ([address] )     Hypertension        Past Surgical History:   Procedure Laterality Date    APPENDECTOMY      CHOLECYSTECTOMY      EYE SURGERY Left     LEG SURGERY Right     vein    TONSILLECTOMY      VEIN SURGERY      WRIST SURGERY Right     ganglion cyst       Meds/Allergies:    Prior to Admission medications    Medication Sig Start Date End Date Taking?  Authorizing Provider   amLODIPine (NORVASC) 5 mg tablet Take 1 tablet (5 mg total) by mouth daily 7/14/20   Ophelia Márquez DO   atorvastatin (LIPITOR) 40 mg tablet Take 1 tablet (40 mg total) by mouth daily 10/13/20   Henry Witt DO   calcium carbonate-vitamin D (OSCAL-D) 500 mg-200 units per tablet Take 1 tablet by mouth daily with breakfast  Patient not taking: Reported on 7/14/2020 6/24/20   Celeste Church MD   insulin NPH-insulin regular (NovoLIN 70/30 ReliOn) 100 units/mL subcutaneous injection Inject 45 Units under the skin 2 (two) times a day before meals 8/24/20   Henry Witt DO   Insulin Pen Needle 31G X 6 MM MISC by Does not apply route    Historical Provider, MD   lisinopril (ZESTRIL) 20 mg tablet Take 1 tablet (20 mg total) by mouth daily  Patient not taking: Reported on 12/21/2020 6/21/20   Murali Malik MD   lisinopril-hydrochlorothiazide (PRINZIDE,ZESTORETIC) 20-12 5 MG per tablet Take 1 tablet by mouth daily 7/14/20   Henry Witt DO   metFORMIN (GLUCOPHAGE-XR) 500 mg 24 hr tablet Take 1 tablet (500 mg total) by mouth 2 (two) times a day 10/13/20   Henry Witt DO   mupirocin (BACTROBAN) 2 % ointment Apply topically 2 (two) times a day Apply to affected area twice daily  Patient not taking: Reported on 10/13/2020 7/1/19   Ruperto Eli MD   ReliOn Ultra Thin Lancets 30G MISC USE 1 THREE TIMES DAILY 7/16/20   Historical Provider, MD   sertraline (ZOLOFT) 25 mg tablet Take 1 tablet (25 mg total) by mouth daily  Patient not taking: Reported on 7/14/2020 7/9/19   Henry Witt DO     all medications and allergies reviewed    Allergies: Allergies   Allergen Reactions    Ciprofloxacin     Other      Nitro TD Patch    Simvastatin Hives       Social History:     Marital Status:     Occupation: retired  Patient Pre-hospital Living Situation:  At home  Patient Pre-hospital Level of Mobility:  Unknown  Patient Pre-hospital Diet Restrictions:  Diabetic  Substance Use History:   Social History     Substance and Sexual Activity   Alcohol Use Not Currently     Social History     Tobacco Use   Smoking Status Former Smoker    Packs/day: 1 00  Years: 3 00    Pack years: 3 00    Types: Cigarettes    Quit date: 65    Years since quittin 0   Smokeless Tobacco Never Used     Social History     Substance and Sexual Activity   Drug Use Never       Family History:  I have reviewed the patient's family history    Physical Exam:     Vitals:   Blood Pressure: 124/60 (20)  Pulse: 70 (20)  Temperature: 98 2 °F (36 8 °C) (20)  Temp Source: Temporal (20)  Respirations: 18 (20)  Height: 5' 5" (165 1 cm) (20)  Weight - Scale: 92 6 kg (204 lb 2 3 oz) (20)  SpO2: 99 % (20)    Physical Exam  Vitals reviewed: Physical assessment performed with assistance by nursing staff  Constitutional:       General: She is awake  HENT:      Head: Normocephalic and atraumatic  Nose: Nose normal       Mouth/Throat:      Mouth: Mucous membranes are dry  Eyes:      Extraocular Movements: Extraocular movements intact  Neck:      Musculoskeletal: Normal range of motion  Cardiovascular:      Rate and Rhythm: Normal rate and regular rhythm  Heart sounds: Normal heart sounds  Pulmonary:      Effort: Tachypnea present  Breath sounds: Examination of the right-lower field reveals decreased breath sounds  Examination of the left-lower field reveals decreased breath sounds  Decreased breath sounds present  Abdominal:      General: Bowel sounds are normal    Musculoskeletal:      Comments: Generalized weakness  Right shoulder pain from previously fractured  Skin:     General: Skin is warm  Neurological:      Mental Status: She is alert and oriented to person, place, and time  Psychiatric:         Attention and Perception: Attention normal          Mood and Affect: Mood normal          Speech: Speech normal          Behavior: Behavior normal  Behavior is cooperative  Additional Data:     Lab Results: I have personally reviewed pertinent reports        Results from last 7 days   Lab Units 12/21/20 2006   WBC Thousand/uL 5 10   HEMOGLOBIN g/dL 13 1   HEMATOCRIT % 39 0*   PLATELETS Thousands/uL 258   NEUTROS PCT % 73   LYMPHS PCT % 19*   MONOS PCT % 6   EOS PCT % 1     Results from last 7 days   Lab Units 12/21/20 2006   SODIUM mmol/L 135   POTASSIUM mmol/L 4 0   CHLORIDE mmol/L 101   CO2 mmol/L 27   BUN mg/dL 24   CREATININE mg/dL 1 56*   ANION GAP mmol/L 7   CALCIUM mg/dL 8 9   ALBUMIN g/dL 4 1   TOTAL BILIRUBIN mg/dL 0 60   ALK PHOS U/L 41*   ALT U/L 11   AST U/L 9*   GLUCOSE RANDOM mg/dL 208*     Results from last 7 days   Lab Units 12/21/20  2132   INR  1 12             Results from last 7 days   Lab Units 12/21/20  2132   LACTIC ACID mmol/L 1 3      GERARDO Risk Score      Most Recent Value   Age >= 72  1 Filed at: 12/21/2020 2216   Known CAD (stenosis >= 50%)  1 Filed at: 12/21/2020 2216   Recent (<=24 hrs) Service Angina  0 Filed at: 12/21/2020 2216   ST Deviation >= 0 5 mm  0 Filed at: 12/21/2020 2216   3+ CAD Risk Factors (FHx, HTN, HLP, DM, Smoker)  1 Filed at: 12/21/2020 2216   Aspirin Use Past 7 Days  0 Filed at: 12/21/2020 2216   Elevated Cardiac Markers  0 Filed at: 12/21/2020 2216   GERARDO Risk Score (Calculated)  3 Filed at: 12/21/2020 2216            Imaging: I have personally reviewed pertinent reports  XR chest 1 view portable   Final Result by Nena Barragan MD (12/21 2050)      No infiltrates are seen                  Workstation performed: [id number]         XR humerus RIGHT   ED Interpretation by Vicky Ozuna DO (12/21 2038)   Fracture noted of the right proximal humerus, however this looks old  It is difficult to ascertain if there is any new fracture element here  Final Result by Nena Barragan MD (12/21 2039)      Obliquely oriented fracture of the proximal humeral shaft and neck with angulation and mild offset              Workstation performed: [id number]         CT head without contrast    (Results Pending)         EKG, Pathology, and Other Studies Reviewed on Admission:   · EKG:  Not performed in the ED    Epic / Care Everywhere Records Reviewed: Yes    ** Please Note: This note has been constructed using a voice recognition system   **

## 2020-12-22 NOTE — ASSESSMENT & PLAN NOTE
Lab Results   Component Value Date    HGBA1C 13 0 (H) 06/17/2020       No results for input(s): POCGLU in the last 72 hours  Blood Sugar Average: Last 72 hrs:  ·  Accu-Cheks Q a c   And HS with sliding scale insulin  · Check hemoglobin A1c  · Continue NPH  · Hold metformin

## 2020-12-22 NOTE — CASE MANAGEMENT
LOS: 1, URR score: 12 and Green, Pt is not a 30 day re-admit or Medicare bundle  CM spoke with pt via phone due to COVID restrictions and explained role  Pt lives with her daughter Suzy Larsen in a 1 story home with ramp entrance  No DME use reported  Pt is independent with ADLs  Family provides transport  Pt PCP is Stacy Mean  She uses [employer] and denies any barriers to obtaining medications  Pt has history of depression and anxiety but denies any prior BH admits  No history of STR or HHC reported either  CM awaiting PT/OT for recommendations s/p syncope with collapse  Family will transport pt home on discharge  CM to follow  Patient/caregiver received discharge checklist   Content reviewed  Patient/caregiver encouraged to participate in discharge plan of care prior to discharge home  CM reviewed d/c planning process including the following: identifying help at home, patient preference for d/c planning needs, availability of treatment team to discuss questions or concerns patient and/or family may have regarding understanding medications and recognizing signs and symptoms once discharged  CM also encouraged patient to follow up with all recommended appointments after discharge  Patient advised of importance for patient and family to participate in managing patients medical well being

## 2020-12-22 NOTE — ASSESSMENT & PLAN NOTE
· Blood pressures in the ED:  98/53, 100/59  · Hold all blood pressure medications including Norvasc, lisinopril, hydrochlorothiazide  · Cardiology is consulted  · Monitor blood pressures  · Fall precautions  · Orthostatic blood pressures

## 2020-12-22 NOTE — ED PROVIDER NOTES
History  Chief Complaint   Patient presents with    Syncope     had a hot flash followed by syncope at the grocery store, right arm pain althought chronic  denies chest pain, SOB, sweats  seen at [place] [address] in [place]     77-year-old female presents emergency department status post syncope in a market  Patient notes that she felt lightheaded and woozy/hot and fell  She was then seen at the urgent care center and due to hypotension, she was sent to the emergency department  She initially did not want to come but she eventually agreed  Patient denies any chest pain shortness of breath numbness or headache prior to the fall  Patient notes no significant history of syncope in the past   Patient notes she has been eating and drinking well  Prior to Admission Medications   Prescriptions Last Dose Informant Patient Reported? Taking?    Insulin Pen Needle 31G X 6 MM MISC  Self Yes No   Sig: by Does not apply route   ReliOn Ultra Thin Lancets 30G MISC  Self Yes No   Sig: USE 1 THREE TIMES DAILY   amLODIPine (NORVASC) 5 mg tablet  Self No No   Sig: Take 1 tablet (5 mg total) by mouth daily   atorvastatin (LIPITOR) 40 mg tablet   No No   Sig: Take 1 tablet (40 mg total) by mouth daily   calcium carbonate-vitamin D (OSCAL-D) 500 mg-200 units per tablet  Self No No   Sig: Take 1 tablet by mouth daily with breakfast   Patient not taking: Reported on 7/14/2020   insulin NPH-insulin regular (NovoLIN 70/30 ReliOn) 100 units/mL subcutaneous injection  Self No No   Sig: Inject 45 Units under the skin 2 (two) times a day before meals   lisinopril (ZESTRIL) 20 mg tablet  Self No No   Sig: Take 1 tablet (20 mg total) by mouth daily   Patient not taking: Reported on 12/21/2020   lisinopril-hydrochlorothiazide (PRINZIDE,ZESTORETIC) 20-12 5 MG per tablet  Self No No   Sig: Take 1 tablet by mouth daily   metFORMIN (GLUCOPHAGE-XR) 500 mg 24 hr tablet   No No   Sig: Take 1 tablet (500 mg total) by mouth 2 (two) times a day mupirocin (BACTROBAN) 2 % ointment  Self No No   Sig: Apply topically 2 (two) times a day Apply to affected area twice daily   Patient not taking: Reported on 10/13/2020   sertraline (ZOLOFT) 25 mg tablet  Self No No   Sig: Take 1 tablet (25 mg total) by mouth daily   Patient not taking: Reported on 2020      Facility-Administered Medications: None       Past Medical History:   Diagnosis Date    Diabetes mellitus ([facility] )     Hypertension        Past Surgical History:   Procedure Laterality Date    APPENDECTOMY      CHOLECYSTECTOMY      EYE SURGERY Left     LEG SURGERY Right     vein    TONSILLECTOMY      VEIN SURGERY      WRIST SURGERY Right     ganglion cyst       Family History   Problem Relation Age of Onset    Cancer Mother     Diabetes Father     Hypertension Father     Heart attack Father      I have reviewed and agree with the history as documented  E-Cigarette/Vaping    E-Cigarette Use Never User      E-Cigarette/Vaping Substances    Nicotine No     THC No     CBD No     Flavoring No     Other No     Unknown No      Social History     Tobacco Use    Smoking status: Former Smoker     Packs/day: 1 00     Years: 3 00     Pack years: 3 00     Types: Cigarettes     Quit date:      Years since quittin 0    Smokeless tobacco: Never Used   Substance Use Topics    Alcohol use: Not Currently    Drug use: Never       Review of Systems   Constitutional: Positive for activity change and fatigue  Respiratory: Negative for chest tightness and shortness of breath  Cardiovascular: Negative for chest pain  Gastrointestinal: Positive for diarrhea  Negative for abdominal pain  Musculoskeletal: Positive for arthralgias  Psychiatric/Behavioral: Negative for agitation  Physical Exam  Physical Exam  Constitutional:       General: She is not in acute distress  Appearance: Normal appearance  She is not ill-appearing  HENT:      Head: Normocephalic and atraumatic  Nose: Nose normal       Mouth/Throat:      Mouth: Mucous membranes are moist    Eyes:      Extraocular Movements: Extraocular movements intact  Conjunctiva/sclera: Conjunctivae normal    Neck:      Musculoskeletal: Normal range of motion and neck supple  No muscular tenderness  Cardiovascular:      Rate and Rhythm: Normal rate and regular rhythm  Pulses: Normal pulses  Pulmonary:      Effort: Pulmonary effort is normal       Breath sounds: Normal breath sounds  Abdominal:      General: Abdomen is flat  Palpations: Abdomen is soft  Musculoskeletal: Normal range of motion  General: Tenderness present  Comments: Tender noticed to the right proximal humerus  There is decreased range of motion due to an old fracture but due to increased pain we will image the area  Skin:     General: Skin is warm and dry  Capillary Refill: Capillary refill takes less than 2 seconds  Neurological:      General: No focal deficit present  Mental Status: She is alert  Vital Signs  ED Triage Vitals [12/21/20 1914]   Temp Pulse Respirations Blood Pressure SpO2   -- 71 16 100/59 98 %      Temp src Heart Rate Source Patient Position - Orthostatic VS BP Location FiO2 (%)   -- Monitor Lying Left arm --      Pain Score       --           Vitals:    12/21/20 1914   BP: 100/59   Pulse: 71   Patient Position - Orthostatic VS: Lying         Visual Acuity      ED Medications  Medications   sodium chloride 0 9 % bolus 1,000 mL (1,000 mL Intravenous New Bag 12/21/20 2015)       Diagnostic Studies  Results Reviewed     Procedure Component Value Units Date/Time    Ferritin [[id number]] Collected: 12/21/20 2006    Lab Status: In process Specimen: Blood from Arm, Left Updated: 12/21/20 2133    C-reactive protein [[id number]] Collected: 12/21/20 2006    Lab Status:  In process Specimen: Blood from Arm, Left Updated: 12/21/20 2132    CK (with reflex to MB) [[id number]] Collected: 12/21/20 2006    Lab Status: In process Specimen: Blood from Arm, Left Updated: 12/21/20 2132    Triglycerides [[id number]] Collected: 12/21/20 2006    Lab Status: In process Specimen: Blood from Arm, Left Updated: 12/21/20 2132    Lactic acid, plasma [[id number]] Collected: 12/21/20 2132    Lab Status: No result Specimen: Blood from Arm, Left     Calcium, ionized [[id number]] Collected: 12/21/20 2132    Lab Status: No result Specimen: Blood from Arm, Left     B-Type Natriuretic Peptide ([address]) [[id number]] Collected: 12/21/20 2132    Lab Status: No result Specimen: Blood from Arm, Left     Protime-INR [[id number]] Collected: 12/21/20 2132    Lab Status: No result Specimen: Blood from Arm, Left     Procalcitonin with AM Reflex [[id number]] Collected: 12/21/20 2132    Lab Status: No result Specimen: Blood from Arm, Left     Glucose 6 phosphate dehydrogenase [[id number]] Collected: 12/21/20 2132    Lab Status: No result Specimen: Blood from Arm, Left     Interleukin-6,Serum [[id number]] Collected: 12/21/20 2132    Lab Status: No result Specimen: Blood from Arm, Left     COVID19, Influenza A/B, RSV PCR, [facility] [[id number]]  (Abnormal) Collected: 12/21/20 2006    Lab Status: Final result Specimen: Nares from Nasopharyngeal Swab Updated: 12/21/20 2057     SARS-CoV-2 Positive     INFLUENZA A PCR Negative     INFLUENZA B PCR Negative     RSV PCR Negative    Narrative: This test has been authorized by FDA under an EUA (Emergency Use Assay) for use by authorized laboratories  Clinical caution and judgement should be used with the interpretation of these results with consideration of the clinical impression and other laboratory testing  Testing reported as "Positive" or "Negative" has been proven to be accurate according to standard laboratory validation requirements  All testing is performed with control materials showing appropriate reactivity at standard intervals      Troponin I [[id number]]  (Normal) Collected: 12/21/20 2006 Lab Status: Final result Specimen: Blood from Arm, Left Updated: 12/21/20 2032     Troponin I <0 03 ng/mL     Comprehensive metabolic panel [[id number]]  (Abnormal) Collected: 12/21/20 2006    Lab Status: Final result Specimen: Blood from Arm, Left Updated: 12/21/20 2031     Sodium 135 mmol/L      Potassium 4 0 mmol/L      Chloride 101 mmol/L      CO2 27 mmol/L      ANION GAP 7 mmol/L      BUN 24 mg/dL      Creatinine 1 56 mg/dL      Glucose 208 mg/dL      Calcium 8 9 mg/dL      AST 9 U/L      ALT 11 U/L      Alkaline Phosphatase 41 U/L      Total Protein 7 0 g/dL      Albumin 4 1 g/dL      Total Bilirubin 0 60 mg/dL      eGFR 33 ml/min/1 73sq m     Narrative:      [facility] guidelines for Chronic Kidney Disease (CKD):     Stage 1 with normal or high GFR (GFR > 90 mL/min/1 73 square meters)    Stage 2 Mild CKD (GFR = 60-89 mL/min/1 73 square meters)    Stage 3A Moderate CKD (GFR = 45-59 mL/min/1 73 square meters)    Stage 3B Moderate CKD (GFR = 30-44 mL/min/1 73 square meters)    Stage 4 Severe CKD (GFR = 15-29 mL/min/1 73 square meters)    Stage 5 End Stage CKD (GFR <15 mL/min/1 73 square meters)  Note: GFR calculation is accurate only with a steady state creatinine    CBC and differential [[id number]]  (Abnormal) Collected: 12/21/20 2006    Lab Status: Final result Specimen: Blood from Arm, Left Updated: 12/21/20 2016     WBC 5 10 Thousand/uL      RBC 4 58 Million/uL      Hemoglobin 13 1 g/dL      Hematocrit 39 0 %      MCV 85 fL      MCH 28 6 pg      MCHC 33 5 g/dL      RDW 13 9 %      MPV 7 2 fL      Platelets 446 Thousands/uL      Neutrophils Relative 73 %      Lymphocytes Relative 19 %      Monocytes Relative 6 %      Eosinophils Relative 1 %      Basophils Relative 0 %      Neutrophils Absolute 3 70 Thousands/µL      Lymphocytes Absolute 1 00 Thousands/µL      Monocytes Absolute 0 30 Thousand/µL      Eosinophils Absolute 0 10 Thousand/µL      Basophils Absolute 0 00 Thousands/µL UA w Reflex to Microscopic w Reflex to Culture [[id number]]     Lab Status: No result Specimen: Urine                  XR chest 1 view portable   Final Result by Lizz Ellis MD (12/21 2050)      No infiltrates are seen                  Workstation performed: [id number]         XR humerus RIGHT   ED Interpretation by Santosh Bergman DO (12/21 2038)   Fracture noted of the right proximal humerus, however this looks old  It is difficult to ascertain if there is any new fracture element here  Final Result by Lizz Ellis MD (12/21 2039)      Obliquely oriented fracture of the proximal humeral shaft and neck with angulation and mild offset  Workstation performed: [id number]         CT head without contrast    (Results Pending)              Procedures  Procedures         ED Course  ED Course as of Dec 21 2141   Mon Dec 21, 2020   2036 Creatinine(!): 1 56   2036 Renal function has shown an increased delineating BIRGIT or possibly dehydration  MDM    Disposition  Final diagnoses:   COVID-19   Syncope   Acute kidney injury ([facility] )   Dehydration     Time reflects when diagnosis was documented in both MDM as applicable and the Disposition within this note     Time User Action Codes Description Comment    12/21/2020  9:14 PM Arthurine Flick Add [U07 1] COVID-19     12/21/2020  9:14 PM Brutico, Zahida Corolla Add [R55] Syncope     12/21/2020  9:14 PM Brutico, Zahida Corolla Add [N17 9] Acute kidney injury ([facility] )     12/21/2020  9:14 PM Brutico, Zahida Corolla Add [E86 0] Dehydration       ED Disposition     ED Disposition Condition Date/Time Comment    Admit Stable Mon Dec 21, 2020  9:14 PM Case was discussed with Dee Garcia and the patient's admission status was agreed to be Admission Status: inpatient status to the service of Dr Stephens Spearing           Follow-up Information    None         Patient's Medications   Discharge Prescriptions    No medications on file     No discharge procedures on file      PDMP Review     None          ED Provider  Electronically Signed by           Shruthi Pitt DO  12/21/20 6999

## 2020-12-22 NOTE — ASSESSMENT & PLAN NOTE
· I was notified by nursing that the patient wants to be discharged  At the time I was notified, I was attending to other critical patients in the intensive care unit  I asked the nurse to have the patient wait for me  I was notified by they RN that the patient did not want to wait, and she subsequently signed out against medical advice  The patient was told of the risks of doing so by nursing which included respiratory failure, and potential death, but the patient signed out against medical advice anyway  · I, personally, never had a face-to-face interaction with this patient  · Please refer to the history and physical examination completed by Carolyne Andrews, and the subsequent consultation notes by Cardiology, Nephrology, and Orthopedic surgery for today's evaluation

## 2020-12-22 NOTE — ASSESSMENT & PLAN NOTE
· Positive at this time  · Continue with mild treatment algorithm  · If patient requires oxygen will then start Decadron, Pepcid, remdesivir

## 2020-12-22 NOTE — PHYSICAL THERAPY NOTE
Physical Therapy Evaluation     Patient's Name: Luisana Ruvalcaba    Admitting Diagnosis  Syncope and collapse [R55]  Dehydration [E86 0]  Syncope [R55]  Acute kidney injury ([facility] [address] ) [N17 9]  Fracture of right humerus with routine healing [S42 301D]  COVID-19 [U07 1]    Problem List  Patient Active Problem List   Diagnosis    Mixed hyperlipidemia    Essential hypertension    Uncontrolled type [facility]    Breast cancer screening    Anxiety and depression    Perianal abscess    Coronary artery disease with angina pectoris ([facility] )    Paronychia of great toe of left foot    Diabetic ketoacidosis without coma associated with type 2 diabetes mellitus ([facility] )    Fracture of right humerus with routine healing    History of fall    Medicare annual wellness visit, subsequent    Syncope and collapse    COVID-19 virus infection    Renal insufficiency    Hypotension, unspecified       Past Medical History  Past Medical History:   Diagnosis Date    Coronary artery disease     Diabetes mellitus ([facility]     Hypertension        Past Surgical History  Past Surgical History:   Procedure Laterality Date    APPENDECTOMY      CHOLECYSTECTOMY      EYE SURGERY Left     LEG SURGERY Right     vein    TONSILLECTOMY      VEIN SURGERY      WRIST SURGERY Right     ganglion cyst        12/22/20 0959   PT Last Visit   PT Visit Date 12/22/20   Note Type   Note type Evaluation   Pain Assessment   Pain Assessment Tool Pain Assessment not indicated - pt denies pain   Home Living   Type of [address] One level;Performs ADLs on one level; Able to live on main level with bedroom/bathroom; Ramped entrance   Bathroom Shower/Tub Tub/shower unit   [facility] Grab bars in shower; Shower chair;Grab bars around toilet   [address] [place];Cane  (no AD used at baseline )   Prior Function   Level of [place] Independent with ADLs and functional mobility   Lives With Daughter   Jong Love 30 in the last 6 months 1 to 4   Vocational Retired   Comments + dirver   Restrictions/Precautions   [place] Bearing Precautions Per Order Yes   RUE [place] Bearing Per Order WBAT   Other Precautions Airborne/isolation;Contact/isolation;Multiple lines;Telemetry   General   Family/Caregiver Present No   Cognition   Overall Cognitive Status WFL   Arousal/Participation Alert   Attention Within functional limits   Orientation Level Oriented X4   Memory Within functional limits   Following Commands Follows all commands and directions without difficulty   Comments pt agreeable to PT session   RLE Assessment   RLE Assessment WFL   LLE Assessment   LLE Assessment WFL   Coordination   Sensation WFL   Bed Mobility   Supine to Sit 6  Modified independent   Additional items HOB elevated; Bedrails   Sit to Supine 6  Modified independent   Additional items HOB elevated   Transfers   Sit to Stand 7  Independent   Stand to Sit 7  Independent   Ambulation/Elevation   Gait pattern WNL   Gait Assistance 6  Modified independent   Assistive Device None   Distance 25 ft   Balance   Static Sitting Normal   Dynamic Sitting Good   Static Standing Good   Dynamic Standing Fair +   Ambulatory Fair +   Endurance Deficit   Endurance Deficit No   Activity Tolerance   Activity Tolerance Patient tolerated treatment well   Assessment   Prognosis Excellent   Problem List   (none)   Assessment Pt is 65 y o  female seen for PT evaluation s/p admit to [facility] on 12/21/2020 w/ Syncope and collapse  PT consulted to assess pt's functional mobility and d/c needs  Order placed for PT eval and tx, w/ up as tolerated order  Comorbidities affecting pt's physical performance at time of assessment include: anxiety and h/o fall  PTA, pt was lives w/ daughter in 1 level  home   Personal factors affecting pt at time of IE include: positive fall history  Please find objective findings from PT assessment regarding body systems outlined above with impairments and limitations including no impairments  Pt's clinical presentation is currently unstable/unpredictable seen in pt's presentation of active COVID infection  From PT/mobility standpoint, recommendation at time of d/c would be anticipate no needs pending progress in order to facilitate return to PLOF     Barriers to Discharge None   Goals   Patient Goals to go home today   PT Treatment Day 0   Recommendation   PT Discharge Recommendation Return to previous environment with no needs   Equipment Recommended   (none)   PT - OK to Discharge Yes   Additional Comments upon conclusion pt supine in bed with all needs in reach           Wyatt King, PT

## 2020-12-22 NOTE — CONSULTS
Orthopedics   Minda Daly 65 y o  female MRN: [id number]  Unit/Bed#: -02      Chief Complaint:   right arm and shoulder pain    HPI:   65 y o  right hand dominant female status post syncopal fall from standing complaining of right shoulder pain  The patient is s/p right proximal humerus fracture sustained initially in June 2020  She reports yesterday she experienced a syncopal event while in a grocery store  She later presented to urgent care when she was urged to seek emergency medical evaluation due to hypotension  She was transported to the ED via EMS  XRs of the right humerus were obtained in the ED  She was subsequently admitted due to BIRGIT, dehydration, COVID-19  The patient reports overall, her right arm feels about the same as it did prior to her fall yesterday  She reports initially after the fall, she did experience increased pain, for which she contributes to using the shopping cart to pull herself up off the floor after syncope  She denies numbness or tingling distally  Review Of Systems:   · Skin: Normal  · Neuro: See HPI  · Musculoskeletal: See HPI  · 14 point review of systems negative except as stated above     Past Medical History:   Past Medical History:   Diagnosis Date    Coronary artery disease     Diabetes mellitus ([facility] )     Hypertension        Past Surgical History:   Past Surgical History:   Procedure Laterality Date    APPENDECTOMY      CHOLECYSTECTOMY      EYE SURGERY Left     LEG SURGERY Right     vein    TONSILLECTOMY      VEIN SURGERY      WRIST SURGERY Right     ganglion cyst       Family History:  Family history reviewed and non-contributory  Family History   Problem Relation Age of Onset    Cancer Mother     Diabetes Father     Hypertension Father     Heart attack Father        Social History:  Social History     Socioeconomic History    Marital status:       Spouse name: None    Number of children: None    Years of education: None    Highest education level: None   Occupational History    None   Social Needs    Financial resource strain: None    Food insecurity     Worry: None     Inability: None    Transportation needs     Medical: None     Non-medical: None   Tobacco Use    Smoking status: Former Smoker     Packs/day: 1 00     Years: 3 00     Pack years: 3 00     Types: Cigarettes     Quit date: 1975     Years since quittin 0    Smokeless tobacco: Never Used   Substance and Sexual Activity    Alcohol use: Not Currently    Drug use: Never    Sexual activity: None   Lifestyle    Physical activity     Days per week: None     Minutes per session: None    Stress: None   Relationships    Social connections     Talks on phone: None     Gets together: None     Attends Scientologist service: None     Active member of club or organization: None     Attends meetings of clubs or organizations: None     Relationship status: None    Intimate partner violence     Fear of current or ex partner: None     Emotionally abused: None     Physically abused: None     Forced sexual activity: None   Other Topics Concern    None   Social History Narrative    None       Allergies:    Allergies   Allergen Reactions    Ciprofloxacin     Other      Nitro TD Patch    Simvastatin Hives           Labs:  0   Lab Value Date/Time    HCT 36 3 (L) 2020 0805    HCT 35 7 (L) 2020 0544    HCT 39 0 (L) 2020    HCT 40 7 2018 0849    HGB 12 1 2020 0805    HGB 12 1 2020 0544    HGB 13 1 2020    HGB 13 4 2018 0849    INR 1 17 2020 0545    WBC 3 70 (L) 2020 0805    WBC 3 40 (L) 2020 0544    WBC 5 10 2020    WBC 5 2 2018 0849    CRP <5 0 2020 2006       Meds:    Current Facility-Administered Medications:     acetaminophen (TYLENOL) tablet 650 mg, 650 mg, Oral, Q6H PRN, JOSELUIS Silva    ascorbic acid (VITAMIN C) tablet 1,000 mg, 1,000 mg, Oral, Q12H [facility], Na Irving CRNP, 1,000 mg at 12/22/20 0035    cholecalciferol (VITAMIN D3) tablet 2,000 Units, 2,000 Units, Oral, Daily, STEFAN SilvaNP, 2,000 Units at 12/22/20 0915    enoxaparin (LOVENOX) subcutaneous injection 40 mg, 40 mg, Subcutaneous, Daily, STEFAN SilvaNP, 40 mg at 12/22/20 0915    famotidine (PEPCID) tablet 20 mg, 20 mg, Oral, Daily, Na Irving CRNP, 20 mg at 12/22/20 0915    insulin aspart protamine-insulin aspart (NovoLOG 70/30) 100 units/mL subcutaneous injection 45 Units, 45 Units, Subcutaneous, BID AC, STEFAN SilvaNP, 45 Units at 12/22/20 0915    insulin lispro (HumaLOG) 100 units/mL subcutaneous injection 1-5 Units, 1-5 Units, Subcutaneous, TID AC, 1 Units at 12/22/20 0915 **AND** Fingerstick Glucose (POCT), , , TID AC, JOSELUIS Silva    insulin lispro (HumaLOG) 100 units/mL subcutaneous injection 1-5 Units, 1-5 Units, Subcutaneous, HS, STEFAN SilvaNP, 1 Units at 12/22/20 0115    magnesium sulfate 2 g/50 mL IVPB (premix) 2 g, 2 g, Intravenous, Once, Jillian Mejia, CRNP    zinc sulfate (ZINCATE) capsule 220 mg, 220 mg, Oral, Daily, 220 mg at 12/22/20 0915 **FOLLOWED BY** [START ON 12/29/2020] multivitamin-minerals (CENTRUM ADULTS) tablet 1 tablet, 1 tablet, Oral, Daily, JOSELUIS Silva    ondansetron (ZOFRAN) injection 4 mg, 4 mg, Intravenous, Q6H PRN, JOSELUIS Silva    potassium chloride (K-DUR,KLOR-CON) CR tablet 20 mEq, 20 mEq, Oral, Once, Jillian Willettge, CRNP    sodium chloride 0 9 % infusion, 100 mL/hr, Intravenous, Continuous, JOSELUIS Silva, Last Rate: 100 mL/hr at 12/22/20 0023, 100 mL/hr at 12/22/20 0023    Blood Culture:   No results found for: BLOODCX    Wound Culture:   Lab Results   Component Value Date    WOUNDCULT 4+ Growth of Staphylococcus aureus (A) 06/24/2019       Ins and Outs:  I/O last 24 hours:   In: 1000 [IV Piggyback:1000]  Out: -           Physical Exam:   /58 (BP Location: Right arm)   Pulse 70   Temp 98 °F (36 7 °C) (Temporal)   Resp 18   Ht 5' 5" (1 651 m)   Wt 92 6 kg (204 lb 2 3 oz)   SpO2 96%   BMI 33 97 kg/m²   Gen: Alert and oriented to person, place, time  HEENT: EOMI, eyes clear, moist mucus membranes, hearing intact  Respiratory: Bilateral chest rise  No audible wheezing found  Cardiovascular: Regular Rate and Rhythm  Abdomen: soft nontender/nondistended  Musculoskeletal: right upper extremity  · Skin intact, ecchymosis noted of the distal upper arm and forearm  · Tender to palpation over shoulder and upper arm  · Active FF to 120 degrees without pain  · Sensation intact to radial, ulna, median, musculocutaneous, and axillary nerve distributions  · Motor intact to  radial, ulna, median, musculocutaneous, and axillary nerve distributions  · 2+ rad pulse  · Limb is warm and well perfused with good color and capillary refill  Radiology:   I personally reviewed the films  X-rays right humerus shows oblique fracture proximal humerus fracture with some signs of callous formation  Assessment:  65 y o  female S/P syncope  Chronic right proximal humerus fracture with delayed union with acute exacerbation of pain  Plan:   · Patient offered sling, but declined, stating she has slings at home to use if she feels she needs one  · Analgesics and ice for pain prn   · Activity as tolerated RUE  · Dispo: Ian Ramsey for discharge from ortho perspective  F/U with Dr Brenda Schafer in about 2 weeks  Due to her COVID-19 diagnosis, the patient will need to wait 21 days after diagnosis to be seen in clinic  Virtual visit could be considered  Will defer to Dr Brenda Schafer and his office staff to make the decision for when/how she will see him for follow-up       Rhiannon Petty PA-C

## 2020-12-22 NOTE — ASSESSMENT & PLAN NOTE
· Baseline creatinine of 0 76 on 06/20/2020  · Creatinine on admission 1 56  · Nephrology on consult  · Normal saline solution at 100 mL/hr for the next 24 hours  · Avoid nephrotoxic medications  · Monitor labs

## 2020-12-22 NOTE — OCCUPATIONAL THERAPY NOTE
Occupational Therapy Evaluation      Jaymes Goodpasture    12/22/2020    Patient Active Problem List   Diagnosis    Mixed hyperlipidemia    Essential hypertension    Uncontrolled type 2 diabetes mellitus with hyperglycemia ([facility] )    Breast cancer screening    Anxiety and depression    Perianal abscess    Coronary artery disease with angina pectoris (HCC)    Paronychia of great toe of left foot    Diabetic ketoacidosis without coma associated with type 2 diabetes mellitus ([facility] )    Fracture of right humerus with routine healing    History of fall    Medicare annual wellness visit, subsequent    Syncope and collapse    COVID-19 virus infection    Renal insufficiency    Hypotension, unspecified       Past Medical History:   Diagnosis Date    Coronary artery disease     Diabetes mellitus ([facility] )     Hypertension        Past Surgical History:   Procedure Laterality Date    APPENDECTOMY      CHOLECYSTECTOMY      EYE SURGERY Left     LEG SURGERY Right     vein    TONSILLECTOMY      VEIN SURGERY      WRIST SURGERY Right     ganglion cyst        12/22/20 0958   OT Last Visit   OT Visit Date 12/22/20   Note Type   Note type Evaluation   Restrictions/Precautions   Weight Bearing Precautions Per Order Yes   RUE Weight Bearing Per Order WBAT   Braces or Orthoses Sling  (has slings but does not currently wear )   Other Precautions Airborne/isolation;Contact/isolation;Multiple lines;Telemetry   Pain Assessment   Pain Assessment Tool Pain Assessment not indicated - pt denies pain   Pain Score No Pain   Home Living   Type of [address] [place] [address] One level;Performs ADLs on one level; Able to live on main level with bedroom/bathroom; Ramped entrance   Bathroom Shower/Tub Tub/shower unit   [employer] Grab bars in shower; Shower chair;Grab bars around toilet   [address] Walker;Cane  (no AD used at baseline )   Prior Function   Level of [place] Independent with ADLs and functional mobility   Lives With Daughter   Jong Love 30 in the last 6 months 1 to 4  (2 falls reported )   Vocational Retired   Comments (+) driving    Lifestyle   Autonomy Patient reporting being independent with ADLs/IADLs, ambulatory with no AD and lives with daughter in a one story house    Reciprocal Relationships supportive daughter    Service to Others retired    ADL   Eating Assistance 6  Modified independent   [facility][1] 6  [address] [place] [address]  5  [address]  5  [address]  5  [address]  5  Supervision/Setup   Bed Mobility   Supine to Sit 6  Modified independent   Additional items HOB elevated; Bedrails   Sit to Supine 6  Modified independent   Additional items HOB elevated   Transfers   Sit to Stand 7  Independent   Stand to Sit 7  Independent   Functional Mobility   Functional Mobility 7  Independent   Additional Comments Pt ambulated in room with no SOB or LOB    Additional items   (Pt ambulated with no AD)   Balance   Static Sitting Normal   Dynamic Sitting Good   Static Standing Good   Dynamic Standing Fair +   Activity Tolerance   Activity Tolerance Patient tolerated treatment well   Nurse Made Aware RN Mejia Fair verbalized pt appropriate for therapy and made aware of outcomes    RUE Assessment   RUE Assessment WFL   LUE Assessment   LUE Assessment WFL   Hand Function   Gross Motor Coordination Functional   Fine Motor Coordination Functional   Sensation   Light Touch No apparent deficits  (per pt )   Vision-Basic Assessment   Current Vision Wears glasses all the time   Patient Visual Report   (no significant changes reported )   Cognition   Overall Cognitive Status [facility]   Arousal/Participation Alert; Responsive; Cooperative Attention Within functional limits   Orientation Level Oriented X4   Memory Within functional limits   Following Commands Follows all commands and directions without difficulty   Comments Pt agreeable to OT eval    Assessment   Prognosis Good   Assessment Pt is a 65 y o  female who was admitted to [address] [place] [address] on 12/21/2020 with Syncope and collapse  At this time, patient is also affected by the comorbidities of: COVID-19 virus, fx of R humerus, hypotension, renal insufficiency, and DM2  Additionally, patient is affected by the following personal factors:difficulty performing IADLS   Orders placed for OT evaluation/treatment with up with assistance orders  Prior to admission, Patient reporting being independent with ADLs/IADLs, ambulatory with no AD and lives with daughter in a one story house  Upon OT evaluation, patient requires supervision/set-up for UB ADLs and supervision/set-up for LB ADLs  Patient presents with functional use of BUEs, with intact prehension and fine motor coordination, and symmetrical muscle strength  Patient appears to be functioning at baseline, no further Acute OT needs identified at this time to warrant OT services  D/C OT and from OT standpoint, recommendation at time of d/c would be Return to previous environment with social support     Goals   Patient Goals "To go home ASAP"   Recommendation   OT Discharge Recommendation Return to previous environment with social support   OT - OK to Discharge Yes  (Once medically cleared )   Additional Comments  At end of session, pt supine in bed with all needs met with call bell within reach    Barthel Index   Feeding 10   Bathing 5   Grooming Score 5   Dressing Score 10   Bladder Score 10   Bowels Score 10   Toilet Use Score 10   Transfers (Bed/Chair) Score 15   Mobility (Level Surface) Score 0   Stairs Score 0   Barthel Index Score 75     Lakeisha Paniagua OT

## 2020-12-22 NOTE — NURSING NOTE
Patient refused her magnesium, potassium, and all other medications besides her insulin this afternoon  Patient states that she wants to leave, and wants no other care performed  Hospitalist and supervisor both made aware

## 2020-12-22 NOTE — DISCHARGE SUMMARY
Discharge- Warren Gaffneyer 1950, 65 y o  female MRN: [id number]    Unit/Bed#: -02 Encounter: [id number]    Primary Care Provider: Tomas Martin DO   Date and time admitted to hospital: 12/21/2020  7:11 PM        Left against medical advice  Assessment & Plan  · I was notified by nursing that the patient wants to be discharged  At the time I was notified, I was attending to other critical patients in the intensive care unit  I asked the nurse to have the patient wait for me  I was notified by they RN that the patient did not want to wait, and she subsequently signed out against medical advice  The patient was told of the risks of doing so by nursing which included respiratory failure, and potential death, but the patient signed out against medical advice anyway  · I, personally, never had a face-to-face interaction with this patient  · Please refer to the history and physical examination completed by Kam Rodriguez, and the subsequent consultation notes by Cardiology, Nephrology, and Orthopedic surgery for today's evaluation  Discharging Physician / Practitioner: Tesfaye Frausto MD  PCP: Tomas Martin DO  Admission Date:   Admission Orders (From admission, onward)     Ordered        12/21/20 2116  Inpatient Admission  Once                   Discharge Date: 12/22/20    Resolved Problems  Date Reviewed: 12/22/2020    None          Consultations During Hospital Stay:  · Cardiology  · Orthopedic surgery  · Nephrology    Procedures Performed:   · None    Significant Findings / Test Results:   · CT brain-no acute intercranial abnormality  · X-ray chest-no infiltrates are seen  · X-ray right humerus-Obliquely oriented fracture of the proximal humeral shaft and neck with angulation and mild offset  Incidental Findings:   · None     Test Results Pending at Discharge (will require follow up):    · None     Outpatient Tests Requested:  · None    Complications:  None    Reason for Admission:  Syncope, collapse, acute kidney injury, acute COVID-19 infection, right humeral fracture    Hospital Course:     Wolfgang Alfaro is a 65 y o  female patient who originally presented to the hospital on 12/21/2020 due to a syncopal episode and local grocery store  Please refer to the initial history and physical examination completed by Aishwarya Cheek for the initial presenting features and complaints  In brief, the patient is a 49-year-old female who was admitted to [facility] after she came into the emergency room because of a syncopal event  She was admitted to [facility] telemetry  She was seen by Cardiology, and Nephrology  It was felt that her syncopal event was most likely secondary to hypovolemia in the setting of having recent diarrhea and continued diuretic use  She was additionally seen by Nephrology for an acute kidney injury  She had imaging as outlined above  She was seen by Orthopedic surgery for a fracture in the right humerus  Patient refused to sling  Patient subsequently left against medical advice as outlined above at approximately mid day on 12/22/2020  I, Dr Yeni Hahn, never had a face-to-face evaluation with this patient  Additionally, the patient was being treated for an acute COVID-19 positive infection under the mild pathway  The patient, initially admitted to the hospital as inpatient, was discharged earlier than expected given the following: Because she left against medical advice  Please see above list of diagnoses and related plan for additional information  Condition at Discharge: stable     Discharge Day Visit / Exam:     Once again, no physical examination was completed today by me in view of the patient leaving against medical advice and not waiting my evaluation    Discussion with Family:  None    Discharge instructions/Information to patient and family:   See after visit summary for information provided to patient and family        Provisions for Follow-Up Care:  See after visit summary for information related to follow-up care and any pertinent home health orders  Disposition:     Other: Left against medical advice    For Discharges to [facility]:   · Not Applicable to this Patient - Not Applicable to this Patient    Planned Readmission:  None     Discharge Statement:  I spent 35 minutes discharging the patient  This time was spent on the day of discharge  I had direct contact with the patient on the day of discharge  Greater than 50% of the total time was spent examining patient, answering all patient questions, arranging and discussing plan of care with patient as well as directly providing post-discharge instructions  Additional time then spent on discharge activities  Discharge Medications:  See after visit summary for reconciled discharge medications provided to patient and family        ** Please Note: This note has been constructed using a voice recognition system **

## 2020-12-23 ENCOUNTER — TELEPHONE (OUTPATIENT)
Dept: OBGYN CLINIC | Facility: HOSPITAL | Age: 70
End: 2020-12-23

## 2020-12-23 LAB
G6PD BLD QN: 275 U/10E12 RBC (ref 127–427)
RBC # BLD AUTO: 4.37 X10E6/UL (ref 3.77–5.28)

## 2020-12-24 ENCOUNTER — TELEPHONE (OUTPATIENT)
Dept: FAMILY MEDICINE CLINIC | Facility: CLINIC | Age: 70
End: 2020-12-24

## 2020-12-24 LAB
ATRIAL RATE: 80 BPM
IL6 SERPL-MCNC: <2.5 PG/ML (ref 0–13)
P AXIS: 31 DEGREES
PR INTERVAL: 186 MS
QRS AXIS: -60 DEGREES
QRSD INTERVAL: 140 MS
QT INTERVAL: 406 MS
QTC INTERVAL: 468 MS
T WAVE AXIS: 102 DEGREES
VENTRICULAR RATE: 80 BPM

## 2020-12-24 PROCEDURE — 93010 ELECTROCARDIOGRAM REPORT: CPT | Performed by: INTERNAL MEDICINE

## 2021-01-02 DIAGNOSIS — E11.65 UNCONTROLLED TYPE 2 DIABETES MELLITUS WITH HYPERGLYCEMIA (HCC): ICD-10-CM

## 2021-01-04 RX ORDER — METFORMIN HYDROCHLORIDE 500 MG/1
TABLET, EXTENDED RELEASE ORAL
Qty: 180 TABLET | Refills: 0 | Status: SHIPPED | OUTPATIENT
Start: 2021-01-04 | End: 2021-04-27

## 2021-01-14 ENCOUNTER — OFFICE VISIT (OUTPATIENT)
Dept: FAMILY MEDICINE CLINIC | Facility: CLINIC | Age: 71
End: 2021-01-14
Payer: COMMERCIAL

## 2021-01-14 VITALS
SYSTOLIC BLOOD PRESSURE: 122 MMHG | DIASTOLIC BLOOD PRESSURE: 78 MMHG | WEIGHT: 208.4 LBS | HEART RATE: 84 BPM | HEIGHT: 65 IN | OXYGEN SATURATION: 88 % | BODY MASS INDEX: 34.72 KG/M2 | TEMPERATURE: 97.8 F

## 2021-01-14 DIAGNOSIS — I25.119 CORONARY ARTERY DISEASE WITH ANGINA PECTORIS, UNSPECIFIED VESSEL OR LESION TYPE, UNSPECIFIED WHETHER NATIVE OR TRANSPLANTED HEART (HCC): ICD-10-CM

## 2021-01-14 DIAGNOSIS — F33.9 DEPRESSION, RECURRENT (HCC): ICD-10-CM

## 2021-01-14 DIAGNOSIS — N28.9 RENAL INSUFFICIENCY: ICD-10-CM

## 2021-01-14 DIAGNOSIS — E11.65 UNCONTROLLED TYPE 2 DIABETES MELLITUS WITH HYPERGLYCEMIA (HCC): Primary | ICD-10-CM

## 2021-01-14 DIAGNOSIS — I10 ESSENTIAL HYPERTENSION: ICD-10-CM

## 2021-01-14 DIAGNOSIS — S42.294D OTHER CLOSED NONDISPLACED FRACTURE OF PROXIMAL END OF RIGHT HUMERUS WITH ROUTINE HEALING, SUBSEQUENT ENCOUNTER: ICD-10-CM

## 2021-01-14 DIAGNOSIS — U07.1 COVID-19 VIRUS INFECTION: ICD-10-CM

## 2021-01-14 PROCEDURE — 3008F BODY MASS INDEX DOCD: CPT | Performed by: FAMILY MEDICINE

## 2021-01-14 PROCEDURE — 3074F SYST BP LT 130 MM HG: CPT | Performed by: FAMILY MEDICINE

## 2021-01-14 PROCEDURE — 1111F DSCHRG MED/CURRENT MED MERGE: CPT | Performed by: FAMILY MEDICINE

## 2021-01-14 PROCEDURE — 99214 OFFICE O/P EST MOD 30 MIN: CPT | Performed by: FAMILY MEDICINE

## 2021-01-14 PROCEDURE — 1160F RVW MEDS BY RX/DR IN RCRD: CPT | Performed by: FAMILY MEDICINE

## 2021-01-14 PROCEDURE — 3078F DIAST BP <80 MM HG: CPT | Performed by: FAMILY MEDICINE

## 2021-01-14 PROCEDURE — 1036F TOBACCO NON-USER: CPT | Performed by: FAMILY MEDICINE

## 2021-01-14 NOTE — PROGRESS NOTES
Assessment/Plan:       Problem List Items Addressed This Visit        Endocrine    Uncontrolled type 2 diabetes mellitus with hyperglycemia ([facility] ) - Primary       Lab Results   Component Value Date    HGBA1C 11 8 (H) 12/22/2020    diabetes needs better control currently adjusting insulin dosing would recommend an increase in 10 units on the 70/30 insulin twice daily continue with other medications and watch diet closely she is content with this insulin and does not want another form           Relevant Orders    CBC and differential    Comprehensive metabolic panel    Lipid panel    Hemoglobin A1C    TSH, 3rd generation with Free T4 reflex       Cardiovascular and Mediastinum    Essential hypertension      Hypertension stable continue current medications as directed follow-up at next office visit         Relevant Orders    CBC and differential    Comprehensive metabolic panel    Lipid panel    Hemoglobin A1C    TSH, 3rd generation with Free T4 reflex    Coronary artery disease with angina pectoris ([facility] )      Coronary disease stable no chest pain follow-up at next office visit follow-up with Cardiology as scheduled continue current medications         Relevant Orders    CBC and differential    Comprehensive metabolic panel    Lipid panel    Hemoglobin A1C    TSH, 3rd generation with Free T4 reflex       Musculoskeletal and Integument    Fracture of right humerus with routine healing      Post fall syncope with collapse and fractured humerus-prior to that event- hospitalized doing better followed up by Orthopedics now         Relevant Orders    CBC and differential    Comprehensive metabolic panel    Lipid panel    Hemoglobin A1C    TSH, 3rd generation with Free T4 reflex       Genitourinary    Renal insufficiency      Renal insufficiency stable monitor CMP follow-up at next scheduled office visit         Relevant Orders    CBC and differential    Comprehensive metabolic panel    Lipid panel    Hemoglobin A1C    TSH, 3rd generation with Free T4 reflex       Other    COVID-19 virus infection      Post COVID-19 viral infection resolved after hospitalization and syncope with collapse patient doing better now overall         Relevant Orders    CBC and differential    Comprehensive metabolic panel    Lipid panel    Hemoglobin A1C    TSH, 3rd generation with Free T4 reflex    Depression, recurrent (HCC)    Relevant Orders    CBC and differential    Comprehensive metabolic panel    Lipid panel    Hemoglobin A1C    TSH, 3rd generation with Free T4 reflex            Subjective:      Patient ID: Zuri Bell is a 65 y o  female  Patient presents today post hospitalization for passing out she had a syncopal episode with collapse and fractured humerus followed up by Orthopedics here today to review the event and medications and current medical situation after she was also positive for COVID-19      The following portions of the patient's history were reviewed and updated as appropriate: allergies, current medications, past family history, past medical history, past social history, past surgical history and problem list     Review of Systems   Constitutional: Negative for chills, fatigue and fever  HENT: Negative for congestion, nosebleeds, rhinorrhea, sinus pressure and sore throat  Eyes: Negative for discharge and redness  Respiratory: Negative for cough and shortness of breath  Cardiovascular: Negative for chest pain, palpitations and leg swelling  Gastrointestinal: Negative for abdominal pain, blood in stool and nausea  Endocrine: Negative for cold intolerance, heat intolerance and polyuria  Genitourinary: Negative for dysuria and frequency  Musculoskeletal: Negative for arthralgias, back pain and myalgias  Skin: Negative for rash  Neurological: Negative for dizziness, weakness and headaches  Hematological: Negative for adenopathy  Psychiatric/Behavioral: Negative for behavioral problems and sleep disturbance  The patient is not nervous/anxious  Objective:      /78 (BP Location: Left arm, Patient Position: Sitting)   Pulse 84   Temp 97 8 °F (36 6 °C)   Ht 5' 5" (1 651 m)   Wt 94 5 kg (208 lb 6 4 oz)   SpO2 (!) 88%   BMI 34 68 kg/m²        Physical Exam  Vitals signs and nursing note reviewed  Constitutional:       General: She is not in acute distress  Appearance: Normal appearance  She is well-developed and normal weight  HENT:      Head: Normocephalic and atraumatic  Right Ear: Tympanic membrane and external ear normal       Left Ear: Tympanic membrane and external ear normal       Nose: Nose normal       Mouth/Throat:      Mouth: Mucous membranes are moist       Pharynx: Oropharynx is clear  No oropharyngeal exudate  Eyes:      General: No scleral icterus  Right eye: No discharge  Left eye: No discharge  Conjunctiva/sclera: Conjunctivae normal       Pupils: Pupils are equal, round, and reactive to light  Neck:      Musculoskeletal: Normal range of motion  Thyroid: No thyromegaly  Vascular: No JVD  Cardiovascular:      Rate and Rhythm: Normal rate and regular rhythm  Heart sounds: Normal heart sounds  No murmur  Pulmonary:      Effort: Pulmonary effort is normal       Breath sounds: No wheezing or rales  Chest:      Chest wall: No tenderness  Abdominal:      General: Bowel sounds are normal  There is no distension  Palpations: Abdomen is soft  There is no mass  Tenderness: There is no abdominal tenderness  Musculoskeletal: Normal range of motion  General: No tenderness or deformity  Lymphadenopathy:      Cervical: No cervical adenopathy  Skin:     General: Skin is warm and dry  Findings: No rash  Neurological:      Mental Status: She is alert and oriented to person, place, and time  Cranial Nerves: No cranial nerve deficit        Coordination: Coordination normal       Deep Tendon Reflexes: Reflexes are normal and symmetric  Reflexes normal    Psychiatric:         Mood and Affect: Mood normal          Behavior: Behavior normal          Thought Content: Thought content normal          Judgment: Judgment normal           Data:    Laboratory Results: I have personally reviewed the pertinent laboratory results/reports   Radiology/Other Diagnostic Testing Results: I have personally reviewed pertinent reports         Lab Results   Component Value Date    WBC 3 70 (L) 12/22/2020    HGB 12 1 12/22/2020    HCT 36 3 (L) 12/22/2020    MCV 85 12/22/2020     12/22/2020     Lab Results   Component Value Date     04/09/2018    K 3 5 12/22/2020     12/22/2020    CO2 25 12/22/2020    ANIONGAP 10 1 04/09/2018    BUN 19 12/22/2020    CREATININE 0 91 12/22/2020    GLUF 769 (HH) 06/17/2020    CALCIUM 8 4 (L) 12/22/2020    AST 9 (L) 12/21/2020    ALT 11 12/21/2020    ALKPHOS 41 (L) 12/21/2020    PROT 6 6 04/09/2018    BILITOT 0 9 04/09/2018    EGFR 63 12/22/2020     Lab Results   Component Value Date    CHOLESTEROL 81 12/22/2020    CHOLESTEROL 94 06/17/2020    CHOLESTEROL 90 05/31/2019     Lab Results   Component Value Date    HDL 13 (L) 12/22/2020    HDL 14 (L) 06/17/2020    HDL 19 (L) 05/31/2019     Lab Results   Component Value Date    LDLCALC 32 12/22/2020    LDLCALC 41 06/17/2020    LDLCALC 43 05/31/2019     Lab Results   Component Value Date    TRIG 180 (H) 12/22/2020    TRIG 216 (H) 12/21/2020    TRIG 196 (H) 06/17/2020     No results found for: [place], [place]  Lab Results   Component Value Date    HGJ0PDYKYZWH 0 697 06/17/2020     Lab Results   Component Value Date    HGBA1C 11 8 (H) 12/22/2020     No results found for: ALISIA Martin DO

## 2021-01-14 NOTE — ASSESSMENT & PLAN NOTE
Lab Results   Component Value Date    HGBA1C 11 8 (H) 12/22/2020    diabetes needs better control currently adjusting insulin dosing would recommend an increase in 10 units on the 70/30 insulin twice daily continue with other medications and watch diet closely she is content with this insulin and does not want another form

## 2021-01-14 NOTE — ASSESSMENT & PLAN NOTE
Post fall syncope with collapse and fractured humerus-prior to that event- hospitalized doing better followed up by Orthopedics now

## 2021-01-14 NOTE — ASSESSMENT & PLAN NOTE
Coronary disease stable no chest pain follow-up at next office visit follow-up with Cardiology as scheduled continue current medications

## 2021-01-14 NOTE — ASSESSMENT & PLAN NOTE
Post COVID-19 viral infection resolved after hospitalization and syncope with collapse patient doing better now overall

## 2021-01-14 NOTE — PATIENT INSTRUCTIONS
Meal Planning with Diabetes Exchanges   AMBULATORY CARE:   Diabetes exchanges  are servings of food that contain similar amounts of carbohydrate, fat, protein, and calories within a food group  The exchanges can be used to develop a healthy meal plan that helps to keep your blood sugar within the recommended levels  A meal plan with the right amount of carbohydrates is especially important  Your blood sugar naturally rises after you eat carbohydrates  Too many carbohydrates in 1 meal or snack can raise your blood sugar level  Carbohydrates are found in starches, fruit, milk, yogurt, and sweets  Call your doctor if:   · You have high blood sugar levels during a certain time of day, or almost all of the time  · You often have low blood sugar levels  · You have questions or concerns about your condition or care  Create a meal plan with exchanges:  A dietitian will work with you to develop a healthy meal plan that is right for you  This meal plan will include the amount of exchanges you can have from each food group throughout the day  Follow your meal plan by keeping track of the amount of exchanges you eat for each meal and snack  Your meal plan will be based on your age, weight, blood sugar levels, medicine, and activity level  Starch food group exchanges:  Each exchange below contains about 15 grams of carbohydrate , 3 grams of protein, 1 gram of fat, and 80 calories  · 1 ounce of white, whole wheat or rye bread (1 slice)    · 1 ounce of bagel (about ¼ of a bagel)    · 1 6-inch flour or corn tortilla or 1 4-inch pancake (about ¼ inch thick)    · ?  cup of cooked pasta or rice    · ¾ cup of dry, ready-to-eat cereal with no sugar added     · ½ cup of cooked cereal, such as oatmeal    · 3 shruti cracker squares or 8 animal crackers    · 6 saltine-type crackers or     · 3 cups of popcorn or ¾ ounce of pretzels     · Starchy vegetables and cooked legumes:      ? ½ cup of corn, green peas, sweet potatoes, or mashed potatoes     ? ¼ of a large baked potato     ? 1 cup of acorn, butternut squash, or pumpkin     ? ½ cup of beans, lentils, or peas (such as christine, kidney, or black-eyed)    ? ? cup of lima beans    Fruit group exchanges:  Each exchange contains about 15 grams of carbohydrate  and 60 calories  · 1 small (4 ounce) apple, banana orange, or nectarine    · ½ cup of canned or fresh fruit    · ½ cup (4 ounces) of unsweetened fruit juice    · 2 tablespoons of dried fruit    Milk group exchanges:  Each exchange contains about 12 grams of carbohydrate  and 8 grams of protein  The amount of fat and calories in each serving depends on the type of milk (such as whole, low-fat, or fat-free)  · 1 cup fat-free or low-fat milk    · ¾ cup of plain, nonfat yogurt    · 1 cup fat-free, flavored yogurt with artificial (no calorie) sweetener    Non-starchy vegetable group exchanges:  Each exchange contains about 5 grams of carbohydrate , 2 grams of protein, and 25 calories  Examples include beets, broccoli, cabbage, carrots, cauliflower, cucumber, mushrooms, tomatoes, and zucchini  · ½ cup of cooked vegetables or 1 cup of raw vegetables     · ½ cup of vegetable juice    Meat and meat substitute group exchanges:  Each exchange of a lean meat  listed below contains about 7 grams of protein, 0 to 3 grams of fat, and 45 calories  The meat and meat substitutes food group does not contain any carbohydrates  Medium and high-fat meats have more calories  · 1 ounce of chicken or turkey without skin, or 1 ounce of fish (not breaded or fried)     · 1 ounce of lean beef, pork, or lamb     · 1-inch cube or 1 ounce of low-fat cheese     · 2 egg whites or ¼ cup of egg substitute     · ½ cup of tofu    Sweets, desserts, and other carbohydrate group exchanges:   · Sweets and other desserts:  Each exchange has about 15 grams of carbohydrate   ? 1 ounce of elizabeth food cake or 2-inch square cake (unfrosted)    ? 2 small cookies     ?  ½ cup of sugar-free, fat-free ice cream    ? 1 tablespoon of syrup, jam, jelly, table sugar, or honey    · Combination foods:     ? 1 cup of an entrée, such as lasagna, spaghetti with meatballs, macaroni and cheese, and chili with beans (each serving counts as 2 carbohydrate exchanges )     ? 1 cup of tomato or vegetable beef soup (each serving counts as 1 carbohydrate exchange )    Fat group exchanges:  Each exchange contains 5 grams of fat and 45 calories  · 1 teaspoon of oil (such as canola, olive, or corn oil)     · 6 almonds or cashews, 10 peanuts, or 4 pecan halves     · 2 tablespoons of avocado     · ½ tablespoon of peanut butter     · 1 teaspoon of regular margarine or 2 teaspoons of low-fat margarine     · 1 teaspoon of regular butter or 1 tablespoon of low-fat butter     · 1 teaspoon of regular mayonnaise or 1 tablespoon of low-fat mayonnaise     · 1 tablespoon of regular salad dressing or 2 tablespoons of low-fat salad dressing    Free foods: The foods on this list are called free foods because they have very few calories  Free foods usually do not increase your blood sugar if you limit them  · 1 tablespoon of catsup or taco sauce     · ¼ cup of salsa     · 2 tablespoons of sugar-free syrup or 2 teaspoons of light jam or jelly     · 1 tablespoon of fat-free salad dressing     · 4 tablespoons of fat-free margarine or fat-free mayonnaise     · Sugar-free drinks: diet soda, sugar-free drink mixes, or mineral water     · Low-sodium bouillon or fat-free broth     · Mustard     · Seasonings such as spices, herbs, and garlic     · Sugar-free gelatin without added fruit    Other healthy nutrition guidelines:   · Limit drinks with sugar substitutes  Your dietitian or healthcare provider will encourage you to drink water  Water helps your kidneys to function properly  Ask how much water you should drink every day  · Eat more fiber    Choose foods that are good sources of fiber, such as fruits, vegetables, and whole grains  Cereals that contain 5 or more grams of fiber per serving are good sources of fiber  Legumes such as garbanzo, christine beans, kidney beans, and lentils are also good sources  · Limit fat  Ask your dietitian or healthcare provider how much fat you should eat each day  Choose foods low in fat, saturated fat, trans fat, and cholesterol  Examples include turkey or chicken without the skin, fish, lean cuts of meat, and beans  Low-fat dairy foods, such as low-fat or fat-free milk and low-fat yogurt are also good choices  Omega-3 fatty acids are healthy fats that are found in canola oil, soybean oil and fatty fish  Redgranite, albacore tuna, and sardines are good sources of omega 3 fatty acids  Eat 2 servings of these types of fish each week  Do not eat fried fish  · Limit sugar  Sugar and sweets must be counted toward the carbohydrate exchanges that you can have within your meal plan  Limit sugar and sweets because they are usually also high in calories and fat  Eat smaller portions of sweets by sharing a dessert or asking for a child-size portion at a restaurant  · Limit sodium  (salt) to about 2,300 mg per day  You may need to eat even less sodium if you have certain medical conditions  Foods high in sodium include soy sauce, potato chips, and soup  · Limit alcohol  Ask your healthcare provider if it is safe for you to drink alcohol  If alcohol is safe for you to have, eat a meal when you drink alcohol  If you drink alcohol on an empty stomach, your blood sugar may drop to a low level  Women 21 years or older and men 72 years or older should limit alcohol to 1 drink a day  Men aged 24 to 59 years should limit alcohol to 2 drinks a day  A drink of alcohol is 5 ounces of wine, 12 ounces of beer, or 1½ ounces of liquor  Other ways to manage your diabetes:   · Control your blood sugar level  Test your blood sugar level regularly and keep a record of the results   Ask your healthcare provider when and how often to test your blood sugar  You may need to check your blood sugar level at least 3 times each day  · Talk to your healthcare provider about your weight  Ask if you need to lose weight, and how much you need to lose  If you are overweight, you may need to make other changes to lose weight  Ask your healthcare provider to help you create a weight loss program      · Get regular physical activity  Physical activity can help decrease your blood sugar level  It can also help to decrease your risk for heart disease and help you lose weight  Adults should have moderate intensity physical activity for at least 150 minutes every week  Spread the amount of activity over at least 3 days a week  Do not skip more than 2 days in a row  Children should get at least 60 minutes of moderate physical activity on most days of the week  Examples of moderate physical activity include brisk walking, running, and swimming  Do not sit for longer than 30 minutes  Work with your healthcare provider to create a plan for physical activity  © Copyright [address] Information is for End User's use only and may not be sold, redistributed or otherwise used for commercial purposes  All illustrations and images included in CareNotes® are the copyrighted property of [employer]  or [address] [place] [address]  The above information is an  only  It is not intended as medical advice for individual conditions or treatments  Talk to your doctor, nurse or pharmacist before following any medical regimen to see if it is safe and effective for you

## 2021-04-22 ENCOUNTER — OFFICE VISIT (OUTPATIENT)
Dept: FAMILY MEDICINE CLINIC | Facility: CLINIC | Age: 71
End: 2021-04-22
Payer: COMMERCIAL

## 2021-04-22 VITALS
WEIGHT: 204 LBS | DIASTOLIC BLOOD PRESSURE: 62 MMHG | TEMPERATURE: 99.2 F | SYSTOLIC BLOOD PRESSURE: 120 MMHG | BODY MASS INDEX: 33.99 KG/M2 | HEART RATE: 95 BPM | OXYGEN SATURATION: 96 % | HEIGHT: 65 IN

## 2021-04-22 DIAGNOSIS — I10 ESSENTIAL HYPERTENSION: ICD-10-CM

## 2021-04-22 DIAGNOSIS — E78.2 MIXED HYPERLIPIDEMIA: ICD-10-CM

## 2021-04-22 DIAGNOSIS — I25.119 CORONARY ARTERY DISEASE WITH ANGINA PECTORIS, UNSPECIFIED VESSEL OR LESION TYPE, UNSPECIFIED WHETHER NATIVE OR TRANSPLANTED HEART (HCC): ICD-10-CM

## 2021-04-22 DIAGNOSIS — E11.65 UNCONTROLLED TYPE 2 DIABETES MELLITUS WITH HYPERGLYCEMIA (HCC): Primary | ICD-10-CM

## 2021-04-22 PROCEDURE — 1160F RVW MEDS BY RX/DR IN RCRD: CPT | Performed by: FAMILY MEDICINE

## 2021-04-22 PROCEDURE — 99214 OFFICE O/P EST MOD 30 MIN: CPT | Performed by: FAMILY MEDICINE

## 2021-04-22 PROCEDURE — 3008F BODY MASS INDEX DOCD: CPT | Performed by: FAMILY MEDICINE

## 2021-04-22 PROCEDURE — 1036F TOBACCO NON-USER: CPT | Performed by: FAMILY MEDICINE

## 2021-04-22 PROCEDURE — 3074F SYST BP LT 130 MM HG: CPT | Performed by: FAMILY MEDICINE

## 2021-04-22 PROCEDURE — 3078F DIAST BP <80 MM HG: CPT | Performed by: FAMILY MEDICINE

## 2021-04-22 RX ORDER — HUMAN INSULIN 100 [IU]/ML
60 INJECTION, SUSPENSION SUBCUTANEOUS
Qty: 100 ML | Refills: 3 | Status: SHIPPED | OUTPATIENT
Start: 2021-04-22 | End: 2021-10-13 | Stop reason: SDUPTHER

## 2021-04-22 NOTE — PATIENT INSTRUCTIONS
Meal Planning with Diabetes Exchanges   AMBULATORY CARE:   Diabetes exchanges  are servings of food that contain similar amounts of carbohydrate, fat, protein, and calories within a food group  The exchanges can be used to develop a healthy meal plan that helps to keep your blood sugar within the recommended levels  A meal plan with the right amount of carbohydrates is especially important  Your blood sugar naturally rises after you eat carbohydrates  Too many carbohydrates in 1 meal or snack can raise your blood sugar level  Carbohydrates are found in starches, fruit, milk, yogurt, and sweets  Call your doctor if:   · You have high blood sugar levels during a certain time of day, or almost all of the time  · You often have low blood sugar levels  · You have questions or concerns about your condition or care  Create a meal plan with exchanges:  A dietitian will work with you to develop a healthy meal plan that is right for you  This meal plan will include the amount of exchanges you can have from each food group throughout the day  Follow your meal plan by keeping track of the amount of exchanges you eat for each meal and snack  Your meal plan will be based on your age, weight, blood sugar levels, medicine, and activity level  Starch food group exchanges:  Each exchange below contains about 15 grams of carbohydrate , 3 grams of protein, 1 gram of fat, and 80 calories  · 1 ounce of white, whole wheat or rye bread (1 slice)    · 1 ounce of bagel (about ¼ of a bagel)    · 1 6-inch flour or corn tortilla or 1 4-inch pancake (about ¼ inch thick)    · ?  cup of cooked pasta or rice    · ¾ cup of dry, ready-to-eat cereal with no sugar added     · ½ cup of cooked cereal, such as oatmeal    · 3 shruti cracker squares or 8 animal crackers    · 6 saltine-type crackers or     · 3 cups of popcorn or ¾ ounce of pretzels     · Starchy vegetables and cooked legumes:      ? ½ cup of corn, green peas, sweet potatoes, or mashed potatoes     ? ¼ of a large baked potato     ? 1 cup of acorn, butternut squash, or pumpkin     ? ½ cup of beans, lentils, or peas (such as christine, kidney, or black-eyed)    ? ? cup of lima beans    Fruit group exchanges:  Each exchange contains about 15 grams of carbohydrate  and 60 calories  · 1 small (4 ounce) apple, banana orange, or nectarine    · ½ cup of canned or fresh fruit    · ½ cup (4 ounces) of unsweetened fruit juice    · 2 tablespoons of dried fruit    Milk group exchanges:  Each exchange contains about 12 grams of carbohydrate  and 8 grams of protein  The amount of fat and calories in each serving depends on the type of milk (such as whole, low-fat, or fat-free)  · 1 cup fat-free or low-fat milk    · ¾ cup of plain, nonfat yogurt    · 1 cup fat-free, flavored yogurt with artificial (no calorie) sweetener    Non-starchy vegetable group exchanges:  Each exchange contains about 5 grams of carbohydrate , 2 grams of protein, and 25 calories  Examples include beets, broccoli, cabbage, carrots, cauliflower, cucumber, mushrooms, tomatoes, and zucchini  · ½ cup of cooked vegetables or 1 cup of raw vegetables     · ½ cup of vegetable juice    Meat and meat substitute group exchanges:  Each exchange of a lean meat  listed below contains about 7 grams of protein, 0 to 3 grams of fat, and 45 calories  The meat and meat substitutes food group does not contain any carbohydrates  Medium and high-fat meats have more calories  · 1 ounce of chicken or turkey without skin, or 1 ounce of fish (not breaded or fried)     · 1 ounce of lean beef, pork, or lamb     · 1-inch cube or 1 ounce of low-fat cheese     · 2 egg whites or ¼ cup of egg substitute     · ½ cup of tofu    Sweets, desserts, and other carbohydrate group exchanges:   · Sweets and other desserts:  Each exchange has about 15 grams of carbohydrate   ? 1 ounce of elizabeth food cake or 2-inch square cake (unfrosted)    ? 2 small cookies     ?  ½ cup of sugar-free, fat-free ice cream    ? 1 tablespoon of syrup, jam, jelly, table sugar, or honey    · Combination foods:     ? 1 cup of an entrée, such as lasagna, spaghetti with meatballs, macaroni and cheese, and chili with beans (each serving counts as 2 carbohydrate exchanges )     ? 1 cup of tomato or vegetable beef soup (each serving counts as 1 carbohydrate exchange )    Fat group exchanges:  Each exchange contains 5 grams of fat and 45 calories  · 1 teaspoon of oil (such as canola, olive, or corn oil)     · 6 almonds or cashews, 10 peanuts, or 4 pecan halves     · 2 tablespoons of avocado     · ½ tablespoon of peanut butter     · 1 teaspoon of regular margarine or 2 teaspoons of low-fat margarine     · 1 teaspoon of regular butter or 1 tablespoon of low-fat butter     · 1 teaspoon of regular mayonnaise or 1 tablespoon of low-fat mayonnaise     · 1 tablespoon of regular salad dressing or 2 tablespoons of low-fat salad dressing    Free foods: The foods on this list are called free foods because they have very few calories  Free foods usually do not increase your blood sugar if you limit them  · 1 tablespoon of catsup or taco sauce     · ¼ cup of salsa     · 2 tablespoons of sugar-free syrup or 2 teaspoons of light jam or jelly     · 1 tablespoon of fat-free salad dressing     · 4 tablespoons of fat-free margarine or fat-free mayonnaise     · Sugar-free drinks: diet soda, sugar-free drink mixes, or mineral water     · Low-sodium bouillon or fat-free broth     · Mustard     · Seasonings such as spices, herbs, and garlic     · Sugar-free gelatin without added fruit    Other healthy nutrition guidelines:   · Limit drinks with sugar substitutes  Your dietitian or healthcare provider will encourage you to drink water  Water helps your kidneys to function properly  Ask how much water you should drink every day  · Eat more fiber    Choose foods that are good sources of fiber, such as fruits, vegetables, and whole grains  Cereals that contain 5 or more grams of fiber per serving are good sources of fiber  Legumes such as garbanzo, christine beans, kidney beans, and lentils are also good sources  · Limit fat  Ask your dietitian or healthcare provider how much fat you should eat each day  Choose foods low in fat, saturated fat, trans fat, and cholesterol  Examples include turkey or chicken without the skin, fish, lean cuts of meat, and beans  Low-fat dairy foods, such as low-fat or fat-free milk and low-fat yogurt are also good choices  Omega-3 fatty acids are healthy fats that are found in canola oil, soybean oil and fatty fish  [place]isula, albacore tuna, and sardines are good sources of omega 3 fatty acids  Eat 2 servings of these types of fish each week  Do not eat fried fish  · Limit sugar  Sugar and sweets must be counted toward the carbohydrate exchanges that you can have within your meal plan  Limit sugar and sweets because they are usually also high in calories and fat  Eat smaller portions of sweets by sharing a dessert or asking for a child-size portion at a restaurant  · Limit sodium  (salt) to about 2,300 mg per day  You may need to eat even less sodium if you have certain medical conditions  Foods high in sodium include soy sauce, potato chips, and soup  · Limit alcohol  Ask your healthcare provider if it is safe for you to drink alcohol  If alcohol is safe for you to have, eat a meal when you drink alcohol  If you drink alcohol on an empty stomach, your blood sugar may drop to a low level  Women 21 years or older and men 72 years or older should limit alcohol to 1 drink a day  Men aged 24 to 59 years should limit alcohol to 2 drinks a day  A drink of alcohol is 5 ounces of wine, 12 ounces of beer, or 1½ ounces of liquor  Other ways to manage your diabetes:   · Control your blood sugar level  Test your blood sugar level regularly and keep a record of the results   Ask your healthcare provider when and how often to test your blood sugar  You may need to check your blood sugar level at least 3 times each day  · Talk to your healthcare provider about your weight  Ask if you need to lose weight, and how much you need to lose  If you are overweight, you may need to make other changes to lose weight  Ask your healthcare provider to help you create a weight loss program      · Get regular physical activity  Physical activity can help decrease your blood sugar level  It can also help to decrease your risk for heart disease and help you lose weight  Adults should have moderate intensity physical activity for at least 150 minutes every week  Spread the amount of activity over at least 3 days a week  Do not skip more than 2 days in a row  Children should get at least 60 minutes of moderate physical activity on most days of the week  Examples of moderate physical activity include brisk walking, running, and swimming  Do not sit for longer than 30 minutes  Work with your healthcare provider to create a plan for physical activity  © Copyright [address] Information is for End User's use only and may not be sold, redistributed or otherwise used for commercial purposes  All illustrations and images included in CareNotes® are the copyrighted property of [employer]  or [address] [place] [address]  The above information is an  only  It is not intended as medical advice for individual conditions or treatments  Talk to your doctor, nurse or pharmacist before following any medical regimen to see if it is safe and effective for you

## 2021-04-22 NOTE — ASSESSMENT & PLAN NOTE
Patient's A1c is elevated significantly now she is questioning metformin therapy and would like to stop this IA additionally would like her to change and adjust her insulin dosing as her numbers have elevated significantly and I would like her to see diabetic educator now  Lab Results   Component Value Date    HGBA1C 11 8 (H) 12/22/2020

## 2021-04-22 NOTE — PROGRESS NOTES
Assessment/Plan:       Problem List Items Addressed This Visit        Endocrine    Uncontrolled type 2 diabetes mellitus with hyperglycemia ([facility] [address] ) - Primary     Patient's A1c is elevated significantly now she is questioning metformin therapy and would like to stop this IA additionally would like her to change and adjust her insulin dosing as her numbers have elevated significantly and I would like her to see diabetic educator now  Lab Results   Component Value Date    HGBA1C 11 8 (H) 12/22/2020               Cardiovascular and Mediastinum    Essential hypertension      Hypertension under stable control patient will continue with current amlodipine dose at 5 mg along with hydrochlorothiazide and lisinopril and follow-up at next office visit in light of A1c elevation patient will follow-up in 3 months         Coronary artery disease with angina pectoris (HCC)      No recent chest pain shortness of breath or angina follow-up with Cardiology continue current medications            Other    Mixed hyperlipidemia      Mixed hyperlipidemia stable continue Lipitor 40 mg follow CMP lipid profile                 Subjective:      Patient ID: Vishal Borges is a 65 y o  female  Patient presents for follow-up evaluation review of laboratory work and discussion regarding diabetes poor control      The following portions of the patient's history were reviewed and updated as appropriate: allergies, current medications, past family history, past medical history, past social history, past surgical history and problem list     Review of Systems   Constitutional: Negative for chills, fatigue and fever  HENT: Negative for congestion, nosebleeds, rhinorrhea, sinus pressure and sore throat  Eyes: Negative for discharge and redness  Respiratory: Negative for cough and shortness of breath  Cardiovascular: Negative for chest pain, palpitations and leg swelling     Gastrointestinal: Negative for abdominal pain, blood in stool and nausea  Endocrine: Negative for cold intolerance, heat intolerance and polyuria  Genitourinary: Negative for dysuria and frequency  Musculoskeletal: Negative for arthralgias, back pain and myalgias  Skin: Negative for rash  Neurological: Negative for dizziness, weakness and headaches  Hematological: Negative for adenopathy  Psychiatric/Behavioral: Negative for behavioral problems and sleep disturbance  The patient is not nervous/anxious  Objective:      /62 (BP Location: Left arm, Patient Position: Sitting, Cuff Size: Adult)   Pulse 95   Temp 99 2 °F (37 3 °C) (Tympanic)   Ht 5' 5" (1 651 m)   Wt 92 5 kg (204 lb)   SpO2 96%   BMI 33 95 kg/m²        Physical Exam  Vitals signs and nursing note reviewed  Constitutional:       General: She is not in acute distress  Appearance: Normal appearance  She is well-developed  HENT:      Head: Normocephalic and atraumatic  Right Ear: Tympanic membrane and external ear normal       Left Ear: Tympanic membrane and external ear normal       Nose: Nose normal       Mouth/Throat:      Mouth: Mucous membranes are moist       Pharynx: No oropharyngeal exudate  Eyes:      General: No scleral icterus  Right eye: No discharge  Left eye: No discharge  Conjunctiva/sclera: Conjunctivae normal       Pupils: Pupils are equal, round, and reactive to light  Neck:      Musculoskeletal: Normal range of motion  Thyroid: No thyromegaly  Vascular: No JVD  Cardiovascular:      Rate and Rhythm: Normal rate and regular rhythm  Heart sounds: Normal heart sounds  No murmur  Pulmonary:      Effort: Pulmonary effort is normal       Breath sounds: No wheezing or rales  Chest:      Chest wall: No tenderness  Abdominal:      General: Bowel sounds are normal  There is no distension  Palpations: Abdomen is soft  There is no mass  Tenderness: There is no abdominal tenderness     Musculoskeletal: Normal range of motion  General: No tenderness or deformity  Lymphadenopathy:      Cervical: No cervical adenopathy  Skin:     General: Skin is warm and dry  Findings: No rash  Neurological:      General: No focal deficit present  Mental Status: She is alert and oriented to person, place, and time  Cranial Nerves: No cranial nerve deficit  Coordination: Coordination normal       Deep Tendon Reflexes: Reflexes are normal and symmetric  Reflexes normal    Psychiatric:         Mood and Affect: Mood normal          Behavior: Behavior normal          Thought Content: Thought content normal          Judgment: Judgment normal           Data:    Laboratory Results: I have personally reviewed the pertinent laboratory results/reports   Radiology/Other Diagnostic Testing Results: I have personally reviewed pertinent reports         Lab Results   Component Value Date    WBC 3 70 (L) 12/22/2020    HGB 12 1 12/22/2020    HCT 36 3 (L) 12/22/2020    MCV 85 12/22/2020     12/22/2020     Lab Results   Component Value Date     04/09/2018    K 3 5 12/22/2020     12/22/2020    CO2 25 12/22/2020    ANIONGAP 10 1 04/09/2018    BUN 19 12/22/2020    CREATININE 0 91 12/22/2020    GLUF 769 (HH) 06/17/2020    CALCIUM 8 4 (L) 12/22/2020    AST 9 (L) 12/21/2020    ALT 11 12/21/2020    ALKPHOS 41 (L) 12/21/2020    PROT 6 6 04/09/2018    BILITOT 0 9 04/09/2018    EGFR 63 12/22/2020     Lab Results   Component Value Date    CHOLESTEROL 81 12/22/2020    CHOLESTEROL 94 06/17/2020    CHOLESTEROL 90 05/31/2019     Lab Results   Component Value Date    HDL 13 (L) 12/22/2020    HDL 14 (L) 06/17/2020    HDL 19 (L) 05/31/2019     Lab Results   Component Value Date    LDLCALC 32 12/22/2020    LDLCALC 41 06/17/2020    LDLCALC 43 05/31/2019     Lab Results   Component Value Date    TRIG 180 (H) 12/22/2020    TRIG 216 (H) 12/21/2020    TRIG 196 (H) 06/17/2020     No results found for: [place], [place]  Lab Results   Component Value Date    [id number] 0 697 06/17/2020     Lab Results   Component Value Date    HGBA1C 11 8 (H) 12/22/2020     No results found for: ALISIA Granda DO

## 2021-04-22 NOTE — ASSESSMENT & PLAN NOTE
No recent chest pain shortness of breath or angina follow-up with Cardiology continue current medications

## 2021-04-22 NOTE — ASSESSMENT & PLAN NOTE
Hypertension under stable control patient will continue with current amlodipine dose at 5 mg along with hydrochlorothiazide and lisinopril and follow-up at next office visit in light of A1c elevation patient will follow-up in 3 months

## 2021-04-27 DIAGNOSIS — E11.65 UNCONTROLLED TYPE 2 DIABETES MELLITUS WITH HYPERGLYCEMIA (HCC): ICD-10-CM

## 2021-04-27 DIAGNOSIS — I10 ESSENTIAL HYPERTENSION: ICD-10-CM

## 2021-04-27 RX ORDER — METFORMIN HYDROCHLORIDE 500 MG/1
TABLET, EXTENDED RELEASE ORAL
Qty: 180 TABLET | Refills: 0 | Status: SHIPPED | OUTPATIENT
Start: 2021-04-27 | End: 2021-08-16

## 2021-04-27 RX ORDER — AMLODIPINE BESYLATE 5 MG/1
TABLET ORAL
Qty: 90 TABLET | Refills: 0 | Status: SHIPPED | OUTPATIENT
Start: 2021-04-27 | End: 2021-08-06

## 2021-05-06 DIAGNOSIS — E78.2 MIXED HYPERLIPIDEMIA: ICD-10-CM

## 2021-05-06 RX ORDER — ATORVASTATIN CALCIUM 40 MG/1
TABLET, FILM COATED ORAL
Qty: 90 TABLET | Refills: 0 | Status: SHIPPED | OUTPATIENT
Start: 2021-05-06 | End: 2021-05-06 | Stop reason: SDUPTHER

## 2021-05-06 RX ORDER — ATORVASTATIN CALCIUM 40 MG/1
40 TABLET, FILM COATED ORAL DAILY
Qty: 90 TABLET | Refills: 0 | Status: SHIPPED | OUTPATIENT
Start: 2021-05-06 | End: 2021-08-06

## 2021-05-06 NOTE — TELEPHONE ENCOUNTER
Confirm that this prescription went through otherwise sent back to me a message came back possibly that it failed?  Check with pharmacy

## 2021-06-11 ENCOUNTER — TELEPHONE (OUTPATIENT)
Dept: ADMINISTRATIVE | Facility: OTHER | Age: 71
End: 2021-06-11

## 2021-08-03 ENCOUNTER — VBI (OUTPATIENT)
Dept: ADMINISTRATIVE | Facility: OTHER | Age: 71
End: 2021-08-03

## 2021-08-06 DIAGNOSIS — E78.2 MIXED HYPERLIPIDEMIA: ICD-10-CM

## 2021-08-06 DIAGNOSIS — I10 ESSENTIAL HYPERTENSION: ICD-10-CM

## 2021-08-06 RX ORDER — ATORVASTATIN CALCIUM 40 MG/1
TABLET, FILM COATED ORAL
Qty: 90 TABLET | Refills: 0 | Status: SHIPPED | OUTPATIENT
Start: 2021-08-06 | End: 2021-10-13 | Stop reason: SDUPTHER

## 2021-08-06 RX ORDER — AMLODIPINE BESYLATE 5 MG/1
TABLET ORAL
Qty: 90 TABLET | Refills: 0 | Status: SHIPPED | OUTPATIENT
Start: 2021-08-06 | End: 2021-10-13 | Stop reason: SDUPTHER

## 2021-08-16 DIAGNOSIS — E11.65 UNCONTROLLED TYPE 2 DIABETES MELLITUS WITH HYPERGLYCEMIA (HCC): ICD-10-CM

## 2021-08-16 RX ORDER — METFORMIN HYDROCHLORIDE 500 MG/1
TABLET, EXTENDED RELEASE ORAL
Qty: 180 TABLET | Refills: 0 | Status: SHIPPED | OUTPATIENT
Start: 2021-08-16 | End: 2021-10-13 | Stop reason: SDUPTHER

## 2021-08-26 ENCOUNTER — VBI (OUTPATIENT)
Dept: ADMINISTRATIVE | Facility: OTHER | Age: 71
End: 2021-08-26

## 2021-09-15 ENCOUNTER — APPOINTMENT (OUTPATIENT)
Dept: LAB | Facility: CLINIC | Age: 71
End: 2021-09-15
Payer: COMMERCIAL

## 2021-09-15 DIAGNOSIS — I25.119 CORONARY ARTERY DISEASE WITH ANGINA PECTORIS, UNSPECIFIED VESSEL OR LESION TYPE, UNSPECIFIED WHETHER NATIVE OR TRANSPLANTED HEART (HCC): ICD-10-CM

## 2021-09-15 DIAGNOSIS — N28.9 RENAL INSUFFICIENCY: ICD-10-CM

## 2021-09-15 DIAGNOSIS — F32.A ANXIETY AND DEPRESSION: ICD-10-CM

## 2021-09-15 DIAGNOSIS — E11.65 UNCONTROLLED TYPE 2 DIABETES MELLITUS WITH HYPERGLYCEMIA (HCC): ICD-10-CM

## 2021-09-15 DIAGNOSIS — Z12.11 SCREENING FOR COLON CANCER: ICD-10-CM

## 2021-09-15 DIAGNOSIS — E78.2 MIXED HYPERLIPIDEMIA: ICD-10-CM

## 2021-09-15 DIAGNOSIS — F41.9 ANXIETY AND DEPRESSION: ICD-10-CM

## 2021-09-15 DIAGNOSIS — S42.294D OTHER CLOSED NONDISPLACED FRACTURE OF PROXIMAL END OF RIGHT HUMERUS WITH ROUTINE HEALING, SUBSEQUENT ENCOUNTER: ICD-10-CM

## 2021-09-15 DIAGNOSIS — I10 ESSENTIAL HYPERTENSION: ICD-10-CM

## 2021-09-15 DIAGNOSIS — U07.1 COVID-19 VIRUS INFECTION: ICD-10-CM

## 2021-09-15 DIAGNOSIS — F33.9 DEPRESSION, RECURRENT (HCC): ICD-10-CM

## 2021-09-15 LAB
ALBUMIN SERPL BCP-MCNC: 3.6 G/DL (ref 3.5–5)
ALP SERPL-CCNC: 68 U/L (ref 46–116)
ALT SERPL W P-5'-P-CCNC: 24 U/L (ref 12–78)
ANION GAP SERPL CALCULATED.3IONS-SCNC: 5 MMOL/L (ref 4–13)
AST SERPL W P-5'-P-CCNC: 15 U/L (ref 5–45)
BASOPHILS # BLD AUTO: 0.03 THOUSANDS/ΜL (ref 0–0.1)
BASOPHILS NFR BLD AUTO: 1 % (ref 0–1)
BILIRUB SERPL-MCNC: 0.78 MG/DL (ref 0.2–1)
BUN SERPL-MCNC: 18 MG/DL (ref 5–25)
CALCIUM SERPL-MCNC: 9.2 MG/DL (ref 8.3–10.1)
CHLORIDE SERPL-SCNC: 106 MMOL/L (ref 100–108)
CHOLEST SERPL-MCNC: 103 MG/DL (ref 50–200)
CO2 SERPL-SCNC: 26 MMOL/L (ref 21–32)
CREAT SERPL-MCNC: 0.95 MG/DL (ref 0.6–1.3)
CREAT UR-MCNC: 259 MG/DL
EOSINOPHIL # BLD AUTO: 0.19 THOUSAND/ΜL (ref 0–0.61)
EOSINOPHIL NFR BLD AUTO: 4 % (ref 0–6)
ERYTHROCYTE [DISTWIDTH] IN BLOOD BY AUTOMATED COUNT: 13.5 % (ref 11.6–15.1)
EST. AVERAGE GLUCOSE BLD GHB EST-MCNC: 255 MG/DL
GFR SERPL CREATININE-BSD FRML MDRD: 60 ML/MIN/1.73SQ M
GLUCOSE P FAST SERPL-MCNC: 139 MG/DL (ref 65–99)
HBA1C MFR BLD: 10.5 %
HCT VFR BLD AUTO: 39.5 % (ref 34.8–46.1)
HDLC SERPL-MCNC: 23 MG/DL
HGB BLD-MCNC: 12.6 G/DL (ref 11.5–15.4)
IMM GRANULOCYTES # BLD AUTO: 0.05 THOUSAND/UL (ref 0–0.2)
IMM GRANULOCYTES NFR BLD AUTO: 1 % (ref 0–2)
LDLC SERPL CALC-MCNC: 58 MG/DL (ref 0–100)
LYMPHOCYTES # BLD AUTO: 1.98 THOUSANDS/ΜL (ref 0.6–4.47)
LYMPHOCYTES NFR BLD AUTO: 41 % (ref 14–44)
MCH RBC QN AUTO: 27.8 PG (ref 26.8–34.3)
MCHC RBC AUTO-ENTMCNC: 31.9 G/DL (ref 31.4–37.4)
MCV RBC AUTO: 87 FL (ref 82–98)
MICROALBUMIN UR-MCNC: 52.1 MG/L (ref 0–20)
MICROALBUMIN/CREAT 24H UR: 20 MG/G CREATININE (ref 0–30)
MONOCYTES # BLD AUTO: 0.35 THOUSAND/ΜL (ref 0.17–1.22)
MONOCYTES NFR BLD AUTO: 7 % (ref 4–12)
NEUTROPHILS # BLD AUTO: 2.24 THOUSANDS/ΜL (ref 1.85–7.62)
NEUTS SEG NFR BLD AUTO: 46 % (ref 43–75)
NONHDLC SERPL-MCNC: 80 MG/DL
NRBC BLD AUTO-RTO: 0 /100 WBCS
PLATELET # BLD AUTO: 305 THOUSANDS/UL (ref 149–390)
PMV BLD AUTO: 9.5 FL (ref 8.9–12.7)
POTASSIUM SERPL-SCNC: 4 MMOL/L (ref 3.5–5.3)
PROT SERPL-MCNC: 7.4 G/DL (ref 6.4–8.2)
RBC # BLD AUTO: 4.53 MILLION/UL (ref 3.81–5.12)
SODIUM SERPL-SCNC: 137 MMOL/L (ref 136–145)
T4 FREE SERPL-MCNC: 0.91 NG/DL (ref 0.76–1.46)
TRIGL SERPL-MCNC: 109 MG/DL
TSH SERPL DL<=0.05 MIU/L-ACNC: 4.25 UIU/ML (ref 0.36–3.74)
WBC # BLD AUTO: 4.84 THOUSAND/UL (ref 4.31–10.16)

## 2021-09-15 PROCEDURE — 82570 ASSAY OF URINE CREATININE: CPT

## 2021-09-15 PROCEDURE — 84443 ASSAY THYROID STIM HORMONE: CPT

## 2021-09-15 PROCEDURE — 84439 ASSAY OF FREE THYROXINE: CPT

## 2021-09-15 PROCEDURE — 83036 HEMOGLOBIN GLYCOSYLATED A1C: CPT

## 2021-09-15 PROCEDURE — 85025 COMPLETE CBC W/AUTO DIFF WBC: CPT

## 2021-09-15 PROCEDURE — 36415 COLL VENOUS BLD VENIPUNCTURE: CPT

## 2021-09-15 PROCEDURE — 80053 COMPREHEN METABOLIC PANEL: CPT

## 2021-09-15 PROCEDURE — 80061 LIPID PANEL: CPT

## 2021-09-15 PROCEDURE — 82043 UR ALBUMIN QUANTITATIVE: CPT

## 2021-09-26 DIAGNOSIS — Z12.31 VISIT FOR SCREENING MAMMOGRAM: ICD-10-CM

## 2021-09-26 DIAGNOSIS — F32.A ANXIETY AND DEPRESSION: ICD-10-CM

## 2021-09-26 DIAGNOSIS — E11.65 UNCONTROLLED TYPE 2 DIABETES MELLITUS WITH HYPERGLYCEMIA (HCC): ICD-10-CM

## 2021-09-26 DIAGNOSIS — I10 ESSENTIAL HYPERTENSION: ICD-10-CM

## 2021-09-26 DIAGNOSIS — I25.119 CORONARY ARTERY DISEASE WITH ANGINA PECTORIS, UNSPECIFIED VESSEL OR LESION TYPE, UNSPECIFIED WHETHER NATIVE OR TRANSPLANTED HEART (HCC): ICD-10-CM

## 2021-09-26 DIAGNOSIS — F41.9 ANXIETY AND DEPRESSION: ICD-10-CM

## 2021-09-26 DIAGNOSIS — E78.2 MIXED HYPERLIPIDEMIA: ICD-10-CM

## 2021-09-27 RX ORDER — LISINOPRIL AND HYDROCHLOROTHIAZIDE 20; 12.5 MG/1; MG/1
TABLET ORAL
Qty: 90 TABLET | Refills: 0 | Status: SHIPPED | OUTPATIENT
Start: 2021-09-27 | End: 2021-10-13 | Stop reason: SDUPTHER

## 2021-10-13 ENCOUNTER — OFFICE VISIT (OUTPATIENT)
Dept: FAMILY MEDICINE CLINIC | Facility: CLINIC | Age: 71
End: 2021-10-13
Payer: COMMERCIAL

## 2021-10-13 VITALS
BODY MASS INDEX: 35.65 KG/M2 | SYSTOLIC BLOOD PRESSURE: 120 MMHG | HEART RATE: 82 BPM | HEIGHT: 65 IN | OXYGEN SATURATION: 97 % | WEIGHT: 214 LBS | DIASTOLIC BLOOD PRESSURE: 70 MMHG

## 2021-10-13 DIAGNOSIS — E11.65 UNCONTROLLED TYPE 2 DIABETES MELLITUS WITH HYPERGLYCEMIA (HCC): ICD-10-CM

## 2021-10-13 DIAGNOSIS — I25.119 CORONARY ARTERY DISEASE WITH ANGINA PECTORIS, UNSPECIFIED VESSEL OR LESION TYPE, UNSPECIFIED WHETHER NATIVE OR TRANSPLANTED HEART (HCC): ICD-10-CM

## 2021-10-13 DIAGNOSIS — Z00.00 MEDICARE ANNUAL WELLNESS VISIT, SUBSEQUENT: ICD-10-CM

## 2021-10-13 DIAGNOSIS — Z12.31 VISIT FOR SCREENING MAMMOGRAM: ICD-10-CM

## 2021-10-13 DIAGNOSIS — E78.2 MIXED HYPERLIPIDEMIA: ICD-10-CM

## 2021-10-13 DIAGNOSIS — I10 ESSENTIAL HYPERTENSION: ICD-10-CM

## 2021-10-13 DIAGNOSIS — F33.9 DEPRESSION, RECURRENT (HCC): Primary | ICD-10-CM

## 2021-10-13 DIAGNOSIS — N28.9 RENAL INSUFFICIENCY: ICD-10-CM

## 2021-10-13 DIAGNOSIS — F41.9 ANXIETY AND DEPRESSION: ICD-10-CM

## 2021-10-13 DIAGNOSIS — F32.A ANXIETY AND DEPRESSION: ICD-10-CM

## 2021-10-13 DIAGNOSIS — E66.01 OBESITY, MORBID (HCC): ICD-10-CM

## 2021-10-13 PROCEDURE — 1125F AMNT PAIN NOTED PAIN PRSNT: CPT | Performed by: FAMILY MEDICINE

## 2021-10-13 PROCEDURE — 99214 OFFICE O/P EST MOD 30 MIN: CPT | Performed by: FAMILY MEDICINE

## 2021-10-13 PROCEDURE — 3078F DIAST BP <80 MM HG: CPT | Performed by: FAMILY MEDICINE

## 2021-10-13 PROCEDURE — 1170F FXNL STATUS ASSESSED: CPT | Performed by: FAMILY MEDICINE

## 2021-10-13 PROCEDURE — 3008F BODY MASS INDEX DOCD: CPT | Performed by: FAMILY MEDICINE

## 2021-10-13 PROCEDURE — 3725F SCREEN DEPRESSION PERFORMED: CPT | Performed by: FAMILY MEDICINE

## 2021-10-13 PROCEDURE — G0439 PPPS, SUBSEQ VISIT: HCPCS | Performed by: FAMILY MEDICINE

## 2021-10-13 PROCEDURE — 1160F RVW MEDS BY RX/DR IN RCRD: CPT | Performed by: FAMILY MEDICINE

## 2021-10-13 PROCEDURE — 3074F SYST BP LT 130 MM HG: CPT | Performed by: FAMILY MEDICINE

## 2021-10-13 PROCEDURE — 3288F FALL RISK ASSESSMENT DOCD: CPT | Performed by: FAMILY MEDICINE

## 2021-10-13 PROCEDURE — 1036F TOBACCO NON-USER: CPT | Performed by: FAMILY MEDICINE

## 2021-10-13 RX ORDER — ATORVASTATIN CALCIUM 40 MG/1
40 TABLET, FILM COATED ORAL DAILY
Qty: 90 TABLET | Refills: 1 | Status: SHIPPED | OUTPATIENT
Start: 2021-10-13 | End: 2021-11-04

## 2021-10-13 RX ORDER — LISINOPRIL AND HYDROCHLOROTHIAZIDE 20; 12.5 MG/1; MG/1
1 TABLET ORAL DAILY
Qty: 90 TABLET | Refills: 1 | Status: SHIPPED | OUTPATIENT
Start: 2021-10-13 | End: 2022-06-16 | Stop reason: SDUPTHER

## 2021-10-13 RX ORDER — ALPRAZOLAM 0.25 MG/1
0.25 TABLET ORAL 2 TIMES DAILY PRN
Qty: 20 TABLET | Refills: 0 | Status: SHIPPED | OUTPATIENT
Start: 2021-10-13

## 2021-10-13 RX ORDER — HUMAN INSULIN 100 [IU]/ML
60 INJECTION, SUSPENSION SUBCUTANEOUS
Qty: 100 ML | Refills: 3 | Status: SHIPPED | OUTPATIENT
Start: 2021-10-13

## 2021-10-13 RX ORDER — METFORMIN HYDROCHLORIDE 500 MG/1
500 TABLET, EXTENDED RELEASE ORAL 2 TIMES DAILY
Qty: 180 TABLET | Refills: 1 | Status: SHIPPED | OUTPATIENT
Start: 2021-10-13 | End: 2022-05-23

## 2021-10-13 RX ORDER — AMLODIPINE BESYLATE 5 MG/1
5 TABLET ORAL DAILY
Qty: 90 TABLET | Refills: 1 | Status: SHIPPED | OUTPATIENT
Start: 2021-10-13 | End: 2021-12-17

## 2021-11-04 DIAGNOSIS — E78.2 MIXED HYPERLIPIDEMIA: ICD-10-CM

## 2021-11-04 RX ORDER — ATORVASTATIN CALCIUM 40 MG/1
TABLET, FILM COATED ORAL
Qty: 90 TABLET | Refills: 0 | Status: SHIPPED | OUTPATIENT
Start: 2021-11-04 | End: 2022-02-03

## 2021-11-23 ENCOUNTER — VBI (OUTPATIENT)
Dept: ADMINISTRATIVE | Facility: OTHER | Age: 71
End: 2021-11-23

## 2021-12-17 DIAGNOSIS — I10 ESSENTIAL HYPERTENSION: ICD-10-CM

## 2021-12-17 RX ORDER — AMLODIPINE BESYLATE 5 MG/1
TABLET ORAL
Qty: 90 TABLET | Refills: 0 | Status: SHIPPED | OUTPATIENT
Start: 2021-12-17 | End: 2021-12-23

## 2021-12-23 DIAGNOSIS — I10 ESSENTIAL HYPERTENSION: ICD-10-CM

## 2021-12-23 RX ORDER — AMLODIPINE BESYLATE 5 MG/1
TABLET ORAL
Qty: 90 TABLET | Refills: 0 | Status: SHIPPED | OUTPATIENT
Start: 2021-12-23 | End: 2022-06-16 | Stop reason: SDUPTHER

## 2022-02-03 ENCOUNTER — VBI (OUTPATIENT)
Dept: ADMINISTRATIVE | Facility: OTHER | Age: 72
End: 2022-02-03

## 2022-02-03 DIAGNOSIS — E78.2 MIXED HYPERLIPIDEMIA: ICD-10-CM

## 2022-02-03 RX ORDER — ATORVASTATIN CALCIUM 40 MG/1
TABLET, FILM COATED ORAL
Qty: 90 TABLET | Refills: 0 | Status: SHIPPED | OUTPATIENT
Start: 2022-02-03 | End: 2022-02-11 | Stop reason: SDUPTHER

## 2022-02-11 DIAGNOSIS — E78.2 MIXED HYPERLIPIDEMIA: ICD-10-CM

## 2022-02-11 RX ORDER — ATORVASTATIN CALCIUM 40 MG/1
TABLET, FILM COATED ORAL
Qty: 90 TABLET | Refills: 0 | OUTPATIENT
Start: 2022-02-11

## 2022-02-11 RX ORDER — ATORVASTATIN CALCIUM 40 MG/1
40 TABLET, FILM COATED ORAL DAILY
Qty: 90 TABLET | Refills: 0 | Status: SHIPPED | OUTPATIENT
Start: 2022-02-11 | End: 2022-05-05 | Stop reason: SDUPTHER

## 2022-03-01 ENCOUNTER — VBI (OUTPATIENT)
Dept: ADMINISTRATIVE | Facility: OTHER | Age: 72
End: 2022-03-01

## 2022-05-05 DIAGNOSIS — E78.2 MIXED HYPERLIPIDEMIA: ICD-10-CM

## 2022-05-05 RX ORDER — ATORVASTATIN CALCIUM 40 MG/1
40 TABLET, FILM COATED ORAL DAILY
Qty: 90 TABLET | Refills: 0 | Status: SHIPPED | OUTPATIENT
Start: 2022-05-05 | End: 2022-08-08 | Stop reason: SDUPTHER

## 2022-05-22 DIAGNOSIS — E11.65 UNCONTROLLED TYPE 2 DIABETES MELLITUS WITH HYPERGLYCEMIA (HCC): ICD-10-CM

## 2022-05-23 RX ORDER — METFORMIN HYDROCHLORIDE 500 MG/1
TABLET, EXTENDED RELEASE ORAL
Qty: 180 TABLET | Refills: 0 | Status: SHIPPED | OUTPATIENT
Start: 2022-05-23 | End: 2022-05-31

## 2022-05-28 DIAGNOSIS — E11.65 UNCONTROLLED TYPE 2 DIABETES MELLITUS WITH HYPERGLYCEMIA (HCC): ICD-10-CM

## 2022-05-31 RX ORDER — METFORMIN HYDROCHLORIDE 500 MG/1
TABLET, EXTENDED RELEASE ORAL
Qty: 180 TABLET | Refills: 0 | Status: SHIPPED | OUTPATIENT
Start: 2022-05-31 | End: 2022-08-04 | Stop reason: HOSPADM

## 2022-06-13 ENCOUNTER — RA CDI HCC (OUTPATIENT)
Dept: OTHER | Facility: HOSPITAL | Age: 72
End: 2022-06-13

## 2022-06-13 NOTE — PROGRESS NOTES
[facility]  coding opportunities     [id number], [id number]     Chart Reviewed number of suggestions sent to Provider: 2     Patients Insurance     Medicare Insurance: [address] [place] [address]

## 2022-06-16 ENCOUNTER — OFFICE VISIT (OUTPATIENT)
Dept: FAMILY MEDICINE CLINIC | Facility: CLINIC | Age: 72
End: 2022-06-16
Payer: COMMERCIAL

## 2022-06-16 VITALS
OXYGEN SATURATION: 97 % | TEMPERATURE: 99.4 F | SYSTOLIC BLOOD PRESSURE: 120 MMHG | BODY MASS INDEX: 36.79 KG/M2 | DIASTOLIC BLOOD PRESSURE: 70 MMHG | HEART RATE: 74 BPM | HEIGHT: 65 IN | WEIGHT: 220.8 LBS

## 2022-06-16 DIAGNOSIS — E66.01 OBESITY, MORBID (HCC): Primary | ICD-10-CM

## 2022-06-16 DIAGNOSIS — E11.3553 TYPE 2 DIABETES MELLITUS WITH STABLE PROLIFERATIVE RETINOPATHY OF BOTH EYES, WITH LONG-TERM CURRENT USE OF INSULIN (HCC): ICD-10-CM

## 2022-06-16 DIAGNOSIS — I10 ESSENTIAL HYPERTENSION: ICD-10-CM

## 2022-06-16 DIAGNOSIS — E78.2 MIXED HYPERLIPIDEMIA: ICD-10-CM

## 2022-06-16 DIAGNOSIS — Z12.31 VISIT FOR SCREENING MAMMOGRAM: ICD-10-CM

## 2022-06-16 DIAGNOSIS — F32.A ANXIETY AND DEPRESSION: ICD-10-CM

## 2022-06-16 DIAGNOSIS — Z79.4 TYPE 2 DIABETES MELLITUS WITH STABLE PROLIFERATIVE RETINOPATHY OF BOTH EYES, WITH LONG-TERM CURRENT USE OF INSULIN (HCC): ICD-10-CM

## 2022-06-16 DIAGNOSIS — E11.65 UNCONTROLLED TYPE 2 DIABETES MELLITUS WITH HYPERGLYCEMIA (HCC): ICD-10-CM

## 2022-06-16 DIAGNOSIS — I25.119 CORONARY ARTERY DISEASE WITH ANGINA PECTORIS, UNSPECIFIED VESSEL OR LESION TYPE, UNSPECIFIED WHETHER NATIVE OR TRANSPLANTED HEART (HCC): ICD-10-CM

## 2022-06-16 DIAGNOSIS — F33.9 DEPRESSION, RECURRENT (HCC): ICD-10-CM

## 2022-06-16 DIAGNOSIS — F41.9 ANXIETY AND DEPRESSION: ICD-10-CM

## 2022-06-16 PROCEDURE — 1160F RVW MEDS BY RX/DR IN RCRD: CPT | Performed by: FAMILY MEDICINE

## 2022-06-16 PROCEDURE — 99214 OFFICE O/P EST MOD 30 MIN: CPT | Performed by: FAMILY MEDICINE

## 2022-06-16 PROCEDURE — 3074F SYST BP LT 130 MM HG: CPT | Performed by: FAMILY MEDICINE

## 2022-06-16 PROCEDURE — 3078F DIAST BP <80 MM HG: CPT | Performed by: FAMILY MEDICINE

## 2022-06-16 PROCEDURE — 1036F TOBACCO NON-USER: CPT | Performed by: FAMILY MEDICINE

## 2022-06-16 PROCEDURE — 3008F BODY MASS INDEX DOCD: CPT | Performed by: FAMILY MEDICINE

## 2022-06-16 RX ORDER — AMLODIPINE BESYLATE 5 MG/1
5 TABLET ORAL DAILY
Qty: 90 TABLET | Refills: 1 | Status: SHIPPED | OUTPATIENT
Start: 2022-06-16

## 2022-06-16 RX ORDER — CYCLOBENZAPRINE HCL 10 MG
10 TABLET ORAL 3 TIMES DAILY PRN
Qty: 30 TABLET | Refills: 0 | Status: SHIPPED | OUTPATIENT
Start: 2022-06-16

## 2022-06-16 RX ORDER — LISINOPRIL AND HYDROCHLOROTHIAZIDE 20; 12.5 MG/1; MG/1
1 TABLET ORAL DAILY
Qty: 90 TABLET | Refills: 1 | Status: SHIPPED | OUTPATIENT
Start: 2022-06-16

## 2022-06-16 NOTE — PROGRESS NOTES
Assessment/Plan:       Problem List Items Addressed This Visit        Endocrine    Uncontrolled type 2 diabetes mellitus with hyperglycemia ([place] [address] )       Lab Results   Component Value Date    HGBA1C 10 5 (H) 09/15/2021   Diabetes previously under poor control she has been using her insulin over the winter and now will go for blood work she does not want to have A1c testing done here at the office    Medications will remain unchanged until we follow-up with laboratory work and she will follow-up here with me at next visit           Relevant Medications    lisinopril-hydrochlorothiazide (PRINZIDE,ZESTORETIC) 20-12 5 MG per tablet    Type 2 diabetes mellitus with stable proliferative retinopathy of both eyes, with long-term current use of insulin ([place] [address] )       Cardiovascular and Mediastinum    Essential hypertension     Stable on current medication regimen follow-up at next visit here in 4 months           Relevant Medications    lisinopril-hydrochlorothiazide (PRINZIDE,ZESTORETIC) 20-12 5 MG per tablet    amLODIPine (NORVASC) 5 mg tablet    Coronary artery disease with angina pectoris ([facility] )     Coronary disease stable no chest pain continue same medications maintaining good lipid profile and blood pressure control           Relevant Medications    lisinopril-hydrochlorothiazide (PRINZIDE,ZESTORETIC) 20-12 5 MG per tablet    amLODIPine (NORVASC) 5 mg tablet       Other    Mixed hyperlipidemia    Relevant Medications    lisinopril-hydrochlorothiazide (PRINZIDE,ZESTORETIC) 20-12 5 MG per tablet    Anxiety and depression    Relevant Medications    lisinopril-hydrochlorothiazide (PRINZIDE,ZESTORETIC) 20-12 5 MG per tablet    Depression, recurrent (HCC)     Stable no change in medication regimen follow-up at next visit in the fall           Obesity, morbid ([facility] ) - Primary     Patient will work on diet and exercise at this time and continue to monitor blood sugars and follow up with me at her next scheduled visit Other Visit Diagnoses     Visit for screening mammogram        Relevant Medications    lisinopril-hydrochlorothiazide (PRINZIDE,ZESTORETIC) 20-12 5 MG per tablet            Subjective:      Patient ID: Jesus Panda is a 66 y o  female  Patient here for follow-up evaluation complains of low back pain after she sprained her back    Hip Pain         The following portions of the patient's history were reviewed and updated as appropriate: allergies, current medications, past family history, past medical history, past social history, past surgical history and problem list     Review of Systems   Constitutional: Negative for chills, fatigue and fever  HENT: Negative for congestion, nosebleeds, rhinorrhea, sinus pressure and sore throat  Eyes: Negative for discharge and redness  Respiratory: Negative for cough and shortness of breath  Cardiovascular: Negative for chest pain, palpitations and leg swelling  Gastrointestinal: Negative for abdominal pain, blood in stool and nausea  Endocrine: Negative for cold intolerance, heat intolerance and polyuria  Genitourinary: Negative for dysuria and frequency  Musculoskeletal: Positive for back pain  Negative for arthralgias and myalgias  Skin: Negative for rash  Neurological: Negative for dizziness, weakness and headaches  Hematological: Negative for adenopathy  Psychiatric/Behavioral: Negative for behavioral problems and sleep disturbance  The patient is not nervous/anxious  Objective:      /70   Pulse 74   Temp 99 4 °F (37 4 °C) (Tympanic)   Ht 5' 5" (1 651 m)   Wt 100 kg (220 lb 12 8 oz)   SpO2 97%   BMI 36 74 kg/m²        Physical Exam  Vitals and nursing note reviewed  Constitutional:       General: She is not in acute distress  Appearance: She is well-developed  HENT:      Head: Normocephalic and atraumatic        Right Ear: External ear normal       Left Ear: External ear normal       Nose: Nose normal  Mouth/Throat:      Pharynx: No oropharyngeal exudate  Eyes:      General: No scleral icterus  Right eye: No discharge  Left eye: No discharge  Conjunctiva/sclera: Conjunctivae normal       Pupils: Pupils are equal, round, and reactive to light  Neck:      Thyroid: No thyromegaly  Vascular: No JVD  Cardiovascular:      Rate and Rhythm: Normal rate and regular rhythm  Heart sounds: Normal heart sounds  No murmur heard  Pulmonary:      Effort: Pulmonary effort is normal       Breath sounds: No wheezing or rales  Chest:      Chest wall: No tenderness  Abdominal:      General: Bowel sounds are normal  There is no distension  Palpations: Abdomen is soft  There is no mass  Tenderness: There is no abdominal tenderness  Musculoskeletal:         General: No tenderness or deformity  Normal range of motion  Cervical back: Normal range of motion  Comments: Mild discomfort over lumbar spine left side   Lymphadenopathy:      Cervical: No cervical adenopathy  Skin:     General: Skin is warm and dry  Findings: No rash  Neurological:      Mental Status: She is alert and oriented to person, place, and time  Cranial Nerves: No cranial nerve deficit  Coordination: Coordination normal       Deep Tendon Reflexes: Reflexes are normal and symmetric  Reflexes normal    Psychiatric:         Behavior: Behavior normal          Thought Content: Thought content normal          Judgment: Judgment normal           Data:    Laboratory Results: I have personally reviewed the pertinent laboratory results/reports   Radiology/Other Diagnostic Testing Results: I have personally reviewed pertinent reports         Lab Results   Component Value Date    WBC 4 84 09/15/2021    HGB 12 6 09/15/2021    HCT 39 5 09/15/2021    MCV 87 09/15/2021     09/15/2021     Lab Results   Component Value Date     04/09/2018    K 4 0 09/15/2021     09/15/2021    CO2 26 09/15/2021    ANIONGAP 10 1 04/09/2018    BUN 18 09/15/2021    CREATININE 0 95 09/15/2021    GLUF 139 (H) 09/15/2021    CALCIUM 9 2 09/15/2021    AST 15 09/15/2021    ALT 24 09/15/2021    ALKPHOS 68 09/15/2021    PROT 6 6 04/09/2018    BILITOT 0 9 04/09/2018    EGFR 60 09/15/2021     Lab Results   Component Value Date    CHOLESTEROL 103 09/15/2021    CHOLESTEROL 81 12/22/2020    CHOLESTEROL 94 06/17/2020     Lab Results   Component Value Date    HDL 23 (L) 09/15/2021    HDL 13 (L) 12/22/2020    HDL 14 (L) 06/17/2020     Lab Results   Component Value Date    LDLCALC 58 09/15/2021    LDLCALC 32 12/22/2020    LDLCALC 41 06/17/2020     Lab Results   Component Value Date    TRIG 109 09/15/2021    TRIG 180 (H) 12/22/2020    TRIG 216 (H) 12/21/2020     No results found for: [place], [place]  Lab Results   Component Value Date    [id number] 4 250 (H) 09/15/2021     Lab Results   Component Value Date    HGBA1C 10 5 (H) 09/15/2021     No results found for: ALISIA Doshi DO

## 2022-06-16 NOTE — ASSESSMENT & PLAN NOTE
Coronary disease stable no chest pain continue same medications maintaining good lipid profile and blood pressure control

## 2022-06-16 NOTE — ASSESSMENT & PLAN NOTE
Lab Results   Component Value Date    HGBA1C 10 5 (H) 09/15/2021   Diabetes previously under poor control she has been using her insulin over the winter and now will go for blood work she does not want to have A1c testing done here at the office    Medications will remain unchanged until we follow-up with laboratory work and she will follow-up here with me at next visit

## 2022-06-16 NOTE — ASSESSMENT & PLAN NOTE
Patient will work on diet and exercise at this time and continue to monitor blood sugars and follow up with me at her next scheduled visit

## 2022-08-04 ENCOUNTER — OFFICE VISIT (OUTPATIENT)
Dept: FAMILY MEDICINE CLINIC | Facility: CLINIC | Age: 72
End: 2022-08-04
Payer: COMMERCIAL

## 2022-08-04 VITALS
OXYGEN SATURATION: 96 % | DIASTOLIC BLOOD PRESSURE: 72 MMHG | SYSTOLIC BLOOD PRESSURE: 110 MMHG | WEIGHT: 220 LBS | BODY MASS INDEX: 36.65 KG/M2 | HEIGHT: 65 IN | HEART RATE: 68 BPM | RESPIRATION RATE: 16 BRPM

## 2022-08-04 DIAGNOSIS — E11.3553 TYPE 2 DIABETES MELLITUS WITH STABLE PROLIFERATIVE RETINOPATHY OF BOTH EYES, WITH LONG-TERM CURRENT USE OF INSULIN (HCC): ICD-10-CM

## 2022-08-04 DIAGNOSIS — L23.7 POISON IVY: Primary | ICD-10-CM

## 2022-08-04 DIAGNOSIS — Z79.4 TYPE 2 DIABETES MELLITUS WITH STABLE PROLIFERATIVE RETINOPATHY OF BOTH EYES, WITH LONG-TERM CURRENT USE OF INSULIN (HCC): ICD-10-CM

## 2022-08-04 DIAGNOSIS — I10 ESSENTIAL HYPERTENSION: ICD-10-CM

## 2022-08-04 PROCEDURE — 99214 OFFICE O/P EST MOD 30 MIN: CPT | Performed by: NURSE PRACTITIONER

## 2022-08-04 PROCEDURE — 83036 HEMOGLOBIN GLYCOSYLATED A1C: CPT | Performed by: NURSE PRACTITIONER

## 2022-08-04 PROCEDURE — 3074F SYST BP LT 130 MM HG: CPT | Performed by: NURSE PRACTITIONER

## 2022-08-04 PROCEDURE — 3078F DIAST BP <80 MM HG: CPT | Performed by: NURSE PRACTITIONER

## 2022-08-04 PROCEDURE — 1160F RVW MEDS BY RX/DR IN RCRD: CPT | Performed by: NURSE PRACTITIONER

## 2022-08-04 RX ORDER — PREDNISONE 10 MG/1
TABLET ORAL
Qty: 18 TABLET | Refills: 0 | Status: SHIPPED | OUTPATIENT
Start: 2022-08-04

## 2022-08-04 NOTE — PROGRESS NOTES
OFFICE VISIT  Rhianna Mendosa 66 y o  female MRN: [id number]    BMI Counseling: Body mass index is 36 61 kg/m²  The BMI is above normal  Nutrition recommendations include decreasing portion sizes  Exercise recommendations include moderate physical activity 150 minutes/week  Rationale for BMI follow-up plan is due to patient being overweight or obese  Assessment / Plan:  Problem List Items Addressed This Visit        Endocrine    Type 2 diabetes mellitus with stable proliferative retinopathy of both eyes, with long-term current use of insulin ([facility] [place] )       Lab Results   Component Value Date    HGBA1C 10 5 (H) 09/15/2021   she has known type 2 dm, she reports stopping metformin, and had insulin increased  She reports going back on metformin, atlhough she is getting diarrhea, sugars have fluctuated  Did advise to stop metformin, januvia  Started today  [facility] sugars closely  Advise to complete labs to reassess [facility]           Relevant Medications    sitaGLIPtin (JANUVIA) 100 mg tablet       Cardiovascular and Mediastinum    Essential hypertension     Under good control at todays visit  Musculoskeletal and Integument    Poison ivy - Primary     Start prednisone as ordered, may use benadryl          Relevant Medications    predniSONE 10 mg tablet            Reason For Visit / Chief Complaint  No chief complaint on file  HPI:  Rhianna Mendosa is a 66 y o  female who presents today for a rash, she was pulling weeds yesterday and ntoiced the rash today  Rash to both arms, and left eye  She reports being itchy    She is using rubbing alcohol      Historical Information   Past Medical History:   Diagnosis Date    Coronary artery disease     Diabetes mellitus ([facility] )     Hypertension      Past Surgical History:   Procedure Laterality Date    APPENDECTOMY      CHOLECYSTECTOMY      EYE SURGERY Left     LEG SURGERY Right     vein    TONSILLECTOMY      VEIN SURGERY      WRIST SURGERY Right     ganglion cyst     Social History   Social History     Substance and Sexual Activity   Alcohol Use Not Currently     Social History     Substance and Sexual Activity   Drug Use Never     Social History     Tobacco Use   Smoking Status Former Smoker    Packs/day: 1 00    Years: 3 00    Pack years: 3 00    Types: Cigarettes    Start date: 0    Quit date: 63    Years since quittin 6   Smokeless Tobacco Never Used     Family History   Problem Relation Age of Onset   [facility] Cancer Mother     Diabetes Father     Hypertension Father     Heart attack Father        Meds/Allergies   Allergies   Allergen Reactions    Ciprofloxacin     Other      Nitro TD Patch    Simvastatin Hives       Meds:    Current Outpatient Medications:     predniSONE 10 mg tablet, 30 mg by mouth daily for 3 days, then 20 mg by mouth daily for 3 days, then 10 mg by mouth daily for 3 days, then stop, Disp: 18 tablet, Rfl: 0    sitaGLIPtin (JANUVIA) 100 mg tablet, Take 1 tablet (100 mg total) by mouth daily, Disp: 90 tablet, Rfl: 0    ALPRAZolam (XANAX) 0 25 mg tablet, Take 1 tablet (0 25 mg total) by mouth 2 (two) times a day as needed for anxiety, Disp: 20 tablet, Rfl: 0    amLODIPine (NORVASC) 5 mg tablet, Take 1 tablet (5 mg total) by mouth daily, Disp: 90 tablet, Rfl: 1    atorvastatin (LIPITOR) 40 mg tablet, Take 1 tablet (40 mg total) by mouth daily, Disp: 90 tablet, Rfl: 0    cyclobenzaprine (FLEXERIL) 10 mg tablet, Take 1 tablet (10 mg total) by mouth 3 (three) times a day as needed for muscle spasms, Disp: 30 tablet, Rfl: 0    insulin NPH-insulin regular (NovoLIN 70/30 ReliOn) 100 units/mL subcutaneous injection, Inject 60 Units under the skin 2 (two) times a day before meals, Disp: 100 mL, Rfl: 3    Insulin Pen Needle 31G X 6 MM MISC, by Does not apply route, Disp: , Rfl:     lisinopril-hydrochlorothiazide (PRINZIDE,ZESTORETIC) 20-12 5 MG per tablet, Take 1 tablet by mouth daily, Disp: 90 tablet, Rfl: 1    ReliOn Ultra Thin Lancets 30G MISC, USE 1 THREE TIMES DAILY, Disp: , Rfl:       REVIEW OF SYSTEMS  Review of Systems   Constitutional: Negative for activity change, chills, fatigue and fever  HENT: Negative for congestion, ear discharge, ear pain, sinus pressure, sinus pain, sore throat, tinnitus and trouble swallowing  Eyes: Negative for photophobia, pain, discharge, itching and visual disturbance  Respiratory: Negative for cough, chest tightness, shortness of breath and wheezing  Cardiovascular: Negative for chest pain and leg swelling  Gastrointestinal: Negative for abdominal distention, abdominal pain, constipation, diarrhea, nausea and vomiting  Endocrine: Negative for polydipsia, polyphagia and polyuria  Genitourinary: Negative for dysuria and frequency  Musculoskeletal: Negative for arthralgias, myalgias, neck pain and neck stiffness  Skin: Positive for rash  Negative for color change  Neurological: Negative for dizziness, syncope, weakness, numbness and headaches  Hematological: Does not bruise/bleed easily  Psychiatric/Behavioral: Negative for behavioral problems, confusion, self-injury, sleep disturbance and suicidal ideas  The patient is not nervous/anxious  Current Vitals:   Blood Pressure: 110/72 (08/04/22 1448)  Pulse: 68 (08/04/22 1448)  Respirations: 16 (08/04/22 1448)  Height: 5' 5" (165 1 cm) (08/04/22 1448)  Weight - Scale: 99 8 kg (220 lb) (08/04/22 1448)  SpO2: 96 % (08/04/22 1448)  [unfilled]    PHYSICAL EXAMS:  Physical Exam  Constitutional:       Appearance: She is obese  HENT:      Head: Normocephalic and atraumatic  Cardiovascular:      Rate and Rhythm: Normal rate and regular rhythm  Pulses: Normal pulses  Heart sounds: Normal heart sounds  Pulmonary:      Effort: Pulmonary effort is normal       Breath sounds: Normal breath sounds  Skin:     Findings: Erythema and rash present        Comments: Bilateral arms    Neurological:      General: No focal deficit present  Mental Status: She is alert and oriented to person, place, and time  Psychiatric:         Mood and Affect: Mood normal          Behavior: Behavior normal              Lab, imaging and other studies: I have personally reviewed pertinent reports  Ramakrishna Story

## 2022-08-04 NOTE — ASSESSMENT & PLAN NOTE
Lab Results   Component Value Date    HGBA1C 10 5 (H) 09/15/2021   she has known type 2 dm, she reports stopping metformin, and had insulin increased  She reports going back on metformin, atlhough she is getting diarrhea, sugars have fluctuated  Did advise to stop metformin, januvia  Started today  [facility] sugars closely   Advise to complete labs to reassess [facility]

## 2022-08-05 DIAGNOSIS — E11.3553 TYPE 2 DIABETES MELLITUS WITH STABLE PROLIFERATIVE RETINOPATHY OF BOTH EYES, WITH LONG-TERM CURRENT USE OF INSULIN (HCC): Primary | ICD-10-CM

## 2022-08-05 DIAGNOSIS — Z79.4 TYPE 2 DIABETES MELLITUS WITH STABLE PROLIFERATIVE RETINOPATHY OF BOTH EYES, WITH LONG-TERM CURRENT USE OF INSULIN (HCC): Primary | ICD-10-CM

## 2022-08-05 LAB — SL AMB POCT HEMOGLOBIN AIC: 8.7 (ref ?–6.5)

## 2022-08-05 PROCEDURE — 3052F HG A1C>EQUAL 8.0%<EQUAL 9.0%: CPT | Performed by: NURSE PRACTITIONER

## 2022-08-05 RX ORDER — DULAGLUTIDE 1.5 MG/.5ML
1.5 INJECTION, SOLUTION SUBCUTANEOUS WEEKLY
Qty: 5 ML | Refills: 0 | Status: SHIPPED | OUTPATIENT
Start: 2022-08-05 | End: 2022-08-08 | Stop reason: CLARIF

## 2022-08-08 ENCOUNTER — TELEPHONE (OUTPATIENT)
Dept: FAMILY MEDICINE CLINIC | Facility: CLINIC | Age: 72
End: 2022-08-08

## 2022-08-08 DIAGNOSIS — E11.3553 TYPE 2 DIABETES MELLITUS WITH STABLE PROLIFERATIVE RETINOPATHY OF BOTH EYES, WITH LONG-TERM CURRENT USE OF INSULIN (HCC): Primary | ICD-10-CM

## 2022-08-08 DIAGNOSIS — Z79.4 TYPE 2 DIABETES MELLITUS WITH STABLE PROLIFERATIVE RETINOPATHY OF BOTH EYES, WITH LONG-TERM CURRENT USE OF INSULIN (HCC): Primary | ICD-10-CM

## 2022-08-08 DIAGNOSIS — E78.2 MIXED HYPERLIPIDEMIA: ICD-10-CM

## 2022-08-08 RX ORDER — INSULIN DETEMIR 100 [IU]/ML
10 INJECTION, SOLUTION SUBCUTANEOUS
Qty: 15 ML | Refills: 1 | Status: SHIPPED | OUTPATIENT
Start: 2022-08-08

## 2022-08-08 RX ORDER — ATORVASTATIN CALCIUM 40 MG/1
40 TABLET, FILM COATED ORAL DAILY
Qty: 90 TABLET | Refills: 0 | Status: SHIPPED | OUTPATIENT
Start: 2022-08-08

## 2022-08-08 NOTE — TELEPHONE ENCOUNTER
We can try a long acting insulin, once daily if she really wants to be off metformin, they are typically covered

## 2022-08-11 DIAGNOSIS — Z79.4 TYPE 2 DIABETES MELLITUS WITH STABLE PROLIFERATIVE RETINOPATHY OF BOTH EYES, WITH LONG-TERM CURRENT USE OF INSULIN (HCC): Primary | ICD-10-CM

## 2022-08-11 DIAGNOSIS — E11.3553 TYPE 2 DIABETES MELLITUS WITH STABLE PROLIFERATIVE RETINOPATHY OF BOTH EYES, WITH LONG-TERM CURRENT USE OF INSULIN (HCC): Primary | ICD-10-CM

## 2022-08-11 RX ORDER — PIOGLITAZONEHYDROCHLORIDE 45 MG/1
45 TABLET ORAL DAILY
Qty: 90 TABLET | Refills: 0 | Status: SHIPPED | OUTPATIENT
Start: 2022-08-11

## 2022-09-12 ENCOUNTER — TELEPHONE (OUTPATIENT)
Dept: FAMILY MEDICINE CLINIC | Facility: CLINIC | Age: 72
End: 2022-09-12

## 2022-09-12 NOTE — TELEPHONE ENCOUNTER
Recommend that patient come in for appointment as she was directed by the medical assistant and then the patient refused and said she will go back on her metformin  Ask patient to follow up and contact the office to re-evaluate here    If symptoms worsen go to the emergency department as instructed

## 2022-09-12 NOTE — TELEPHONE ENCOUNTER
Patient called , patient asked for something different then Mediform, so she was put on pioglitazone 45 and the patient took the pill for a week and now is getting dizzy, feet and ankles are swollen, heart starts to speed up when laying flat, when sitting patient does deep breathing and it goes away, refused to make leeanne   Patient is going back on Mediform  Still taking the pioglitazone 45 till tomorrow

## 2022-10-15 ENCOUNTER — RA CDI HCC (OUTPATIENT)
Dept: OTHER | Facility: HOSPITAL | Age: 72
End: 2022-10-15

## 2022-10-15 NOTE — PROGRESS NOTES
[facility]feany[facility]  coding opportunities       Chart reviewed, no opportunity found:   [address]        Patients Insurance     Medicare Insurance: [employer]

## 2022-10-18 ENCOUNTER — OFFICE VISIT (OUTPATIENT)
Dept: FAMILY MEDICINE CLINIC | Facility: CLINIC | Age: 72
End: 2022-10-18
Payer: COMMERCIAL

## 2022-10-18 VITALS
SYSTOLIC BLOOD PRESSURE: 100 MMHG | HEIGHT: 65 IN | RESPIRATION RATE: 18 BRPM | WEIGHT: 225.8 LBS | OXYGEN SATURATION: 95 % | BODY MASS INDEX: 37.62 KG/M2 | TEMPERATURE: 98.5 F | DIASTOLIC BLOOD PRESSURE: 60 MMHG | HEART RATE: 73 BPM

## 2022-10-18 DIAGNOSIS — I10 ESSENTIAL HYPERTENSION: ICD-10-CM

## 2022-10-18 DIAGNOSIS — I25.119 CORONARY ARTERY DISEASE WITH ANGINA PECTORIS, UNSPECIFIED VESSEL OR LESION TYPE, UNSPECIFIED WHETHER NATIVE OR TRANSPLANTED HEART (HCC): ICD-10-CM

## 2022-10-18 DIAGNOSIS — Z00.00 MEDICARE ANNUAL WELLNESS VISIT, SUBSEQUENT: ICD-10-CM

## 2022-10-18 DIAGNOSIS — E11.65 UNCONTROLLED TYPE 2 DIABETES MELLITUS WITH HYPERGLYCEMIA (HCC): Primary | ICD-10-CM

## 2022-10-18 DIAGNOSIS — E66.01 OBESITY, MORBID (HCC): ICD-10-CM

## 2022-10-18 DIAGNOSIS — E78.2 MIXED HYPERLIPIDEMIA: ICD-10-CM

## 2022-10-18 PROCEDURE — G0439 PPPS, SUBSEQ VISIT: HCPCS | Performed by: FAMILY MEDICINE

## 2022-10-18 PROCEDURE — 99214 OFFICE O/P EST MOD 30 MIN: CPT | Performed by: FAMILY MEDICINE

## 2022-10-18 NOTE — ASSESSMENT & PLAN NOTE
Patient will continue with Lipitor 40 mg continue heart healthy diet and check laboratory work at next visit along with at this time

## 2022-10-18 NOTE — ASSESSMENT & PLAN NOTE
Blood pressure stable on amlodipine continue same dosage without change continue with lisinopril hydrochlorothiazide follow-up at next visit in 6 months

## 2022-10-18 NOTE — ASSESSMENT & PLAN NOTE
Lab Results   Component Value Date    HGBA1C 8 7 (A) 08/05/2022   Patient will go for recheck laboratory work now and continue with metformin she is intolerant of other medications and she will continue with Novolin 70 30 twice daily

## 2022-10-18 NOTE — PROGRESS NOTES
Assessment and Plan:     Problem List Items Addressed This Visit        Endocrine    Uncontrolled type 2 diabetes mellitus with hyperglycemia ([facility] ) - Primary       Lab Results   Component Value Date    HGBA1C 8 7 (A) 08/05/2022   Patient will go for recheck laboratory work now and continue with metformin she is intolerant of other medications and she will continue with Novolin 70 30 twice daily         Relevant Orders    CBC and differential    Comprehensive metabolic panel    Lipid panel    TSH, 3rd generation with Free T4 reflex    Hemoglobin A1C    Microalbumin / creatinine urine ratio       Cardiovascular and Mediastinum    Essential hypertension     Blood pressure stable on amlodipine continue same dosage without change continue with lisinopril hydrochlorothiazide follow-up at next visit in 6 months         Relevant Orders    CBC and differential    Comprehensive metabolic panel    Lipid panel    TSH, 3rd generation with Free T4 reflex    Hemoglobin A1C    Coronary artery disease with angina pectoris (HCC)     No chest pain cardiac stable currently continue same medications         Relevant Orders    CBC and differential    Comprehensive metabolic panel    Lipid panel    TSH, 3rd generation with Free T4 reflex    Hemoglobin A1C       Other    Mixed hyperlipidemia     Patient will continue with Lipitor 40 mg continue heart healthy diet and check laboratory work at next visit along with at this time         Relevant Orders    CBC and differential    Comprehensive metabolic panel    Lipid panel    TSH, 3rd generation with Free T4 reflex    Hemoglobin A1C    Medicare annual wellness visit, subsequent    Obesity, morbid ([facility] )     Weight reduction and exercise                Preventive health issues were discussed with patient, and age appropriate screening tests were ordered as noted in patient's After Visit Summary    Personalized health advice and appropriate referrals for health education or preventive services given if needed, as noted in patient's After Visit Summary  History of Present Illness:     Patient presents for a Medicare Wellness Visit    Patient here for Medicare wellness visit and review of general medications and health concerns she did not have her lab work done yet     Patient Care Team:  Burtis Duverney, DO as PCP - General (Family Medicine)     Review of Systems:     Review of Systems   Constitutional: Negative for chills, fatigue and fever  HENT: Negative for congestion, nosebleeds, rhinorrhea, sinus pressure and sore throat  Eyes: Negative for discharge and redness  Respiratory: Negative for cough and shortness of breath  Cardiovascular: Negative for chest pain, palpitations and leg swelling  Gastrointestinal: Negative for abdominal pain, blood in stool and nausea  Endocrine: Negative for cold intolerance, heat intolerance and polyuria  Genitourinary: Negative for dysuria and frequency  Musculoskeletal: Positive for arthralgias, back pain, gait problem and myalgias  Skin: Negative for rash  Neurological: Negative for dizziness, weakness and headaches  Hematological: Negative for adenopathy  Psychiatric/Behavioral: Negative for behavioral problems and sleep disturbance  The patient is not nervous/anxious           Problem List:     Patient Active Problem List   Diagnosis   • Mixed hyperlipidemia   • Essential hypertension   • [facility]   • Breast cancer screening   • Anxiety and depression   • Perianal abscess   • Coronary artery disease with angina pectoris (HCC)   • Paronychia of great toe of left foot   • Diabetic ketoacidosis without coma associated with type 2 diabetes mellitus ([facility] )   • Fracture of right humerus with routine healing   • History of fall   • Medicare annual wellness visit, subsequent   • Syncope and collapse   • COVID-19 virus infection   • Renal insufficiency   • Hypotension, unspecified   • Left against medical advice   • Depression, recurrent ([place] [address] )   • Obesity, morbid ([place] [address] )   • Type 2 diabetes mellitus with stable proliferative retinopathy of both eyes, with long-term current use of insulin ([place] [address] )   • Poison ivy      Past Medical and Surgical History:     Past Medical History:   Diagnosis Date   • Coronary artery disease    • Diabetes mellitus ([place] [address] )    • Hypertension      Past Surgical History:   Procedure Laterality Date   • APPENDECTOMY     • CHOLECYSTECTOMY     • EYE SURGERY Left    • LEG SURGERY Right     vein   • TONSILLECTOMY     • VEIN SURGERY     • WRIST SURGERY Right     ganglion cyst      Family History:     Family History   Problem Relation Age of Onset   • Cancer Mother    • Diabetes Father    • Hypertension Father    • Heart attack Father       Social History:     Social History     Socioeconomic History   • Marital status:      Spouse name: None   • Number of children: None   • Years of education: None   • Highest education level: None   Occupational History   • None   Tobacco Use   • Smoking status: Former Smoker     Packs/day: 1 00     Years: 3 00     Pack years: 3 00     Types: Cigarettes     Start date: 0     Quit date:      Years since quittin 8   • Smokeless tobacco: Never Used   Vaping Use   • Vaping Use: Never used   Substance and Sexual Activity   • Alcohol use: Not Currently   • Drug use: Never   • Sexual activity: None   Other Topics Concern   • None   Social History Narrative   • None     Social Determinants of Health     Financial Resource Strain: Low Risk    • Difficulty of Paying Living Expenses: Not hard at all   Food Insecurity: Not on file   Transportation Needs: No Transportation Needs   • Lack of Transportation (Medical): No   • Lack of Transportation (Non-Medical):  No   Physical Activity: Not on file   Stress: Not on file   Social Connections: Not on file   Intimate Partner Violence: Not on file   Housing Stability: Not on file      Medications and Allergies:     Current Outpatient Medications   Medication Sig Dispense Refill   • amLODIPine (NORVASC) 5 mg tablet Take 1 tablet (5 mg total) by mouth daily 90 tablet 1   • atorvastatin (LIPITOR) 40 mg tablet Take 1 tablet (40 mg total) by mouth daily 90 tablet 0   • insulin NPH-insulin regular (NovoLIN 70/30 ReliOn) 100 units/mL subcutaneous injection Inject 60 Units under the skin 2 (two) times a day before meals 100 mL 3   • Insulin Pen Needle 31G X 6 MM MISC by Does not apply route     • lisinopril-hydrochlorothiazide (PRINZIDE,ZESTORETIC) 20-12 5 MG per tablet Take 1 tablet by mouth daily 90 tablet 1   • ReliOn Ultra Thin Lancets 30G MISC USE 1 THREE TIMES DAILY     • ALPRAZolam (XANAX) 0 25 mg tablet Take 1 tablet (0 25 mg total) by mouth 2 (two) times a day as needed for anxiety (Patient not taking: No sig reported) 20 tablet 0   • cyclobenzaprine (FLEXERIL) 10 mg tablet Take 1 tablet (10 mg total) by mouth 3 (three) times a day as needed for muscle spasms (Patient not taking: Reported on 10/18/2022) 30 tablet 0   • insulin detemir (Levemir FlexTouch) 100 Units/mL injection pen Inject 10 Units under the skin daily at bedtime (Patient not taking: Reported on 10/18/2022) 15 mL 1   • pioglitazone (ACTOS) 45 mg tablet Take 1 tablet (45 mg total) by mouth daily (Patient not taking: Reported on 10/18/2022) 90 tablet 0   • predniSONE 10 mg tablet 30 mg by mouth daily for 3 days, then 20 mg by mouth daily for 3 days, then 10 mg by mouth daily for 3 days, then stop (Patient not taking: Reported on 10/18/2022) 18 tablet 0     No current facility-administered medications for this visit       Allergies   Allergen Reactions   • Ciprofloxacin    • Other      Nitro TD Patch   • Simvastatin Hives      Immunizations:     Immunization History   Administered Date(s) Administered   • COVID-19 PFIZER VACCINE 0 3 ML IM 03/16/2021, 04/06/2021      Health Maintenance:         Topic Date Due   • Colorectal Cancer Screening  Never done   • Breast Cancer Screening: Mammogram  10/19/2016   • Hepatitis C Screening  Completed         Topic Date Due   • Pneumococcal Vaccine: 65+ Years (1 - PCV) Never done   • COVID-19 Vaccine (3 - Booster for Pfizer series) 09/06/2021   • Influenza Vaccine (1) Never done      Medicare Screening Tests and Risk Assessments:     Helen Ryan is here for her Initial Wellness visit  Health Risk Assessment:   Patient rates overall health as good  Patient feels that their physical health rating is slightly worse  Patient is satisfied with their life  Eyesight was rated as slightly worse  Hearing was rated as same  Patient feels that their emotional and mental health rating is same  Patients states they are sometimes angry  Patient states they are often unusually tired/fatigued  Pain experienced in the last 7 days has been none  Patient states that she has experienced no weight loss or gain in last 6 months  Fall Risk Screening: In the past year, patient has experienced: no history of falling in past year      Urinary Incontinence Screening:   Patient has not leaked urine accidently in the last six months  Home Safety:  Patient has trouble with stairs inside or outside of their home  Patient has working smoke alarms and has working carbon monoxide detector  Home safety hazards include: none  Nutrition:   Current diet is Diabetic  Medications:   Patient is not currently taking any over-the-counter supplements  Patient is able to manage medications  Activities of Daily Living (ADLs)/Instrumental Activities of Daily Living (IADLs):   Walk and transfer into and out of bed and chair?: Yes  Dress and groom yourself?: Yes    Bathe or shower yourself?: Yes    Feed yourself?  Yes  Do your laundry/housekeeping?: Yes  Manage your money, pay your bills and track your expenses?: Yes  Make your own meals?: Yes    Do your own shopping?: Yes    Previous Hospitalizations:   Any hospitalizations or ED visits within the last 12 months?: No      Advance Care Planning:   Living will: Yes    Durable POA for healthcare: Yes    Advanced directive: Yes      PREVENTIVE SCREENINGS      Cardiovascular Screening:    General: Screening Not Indicated and History Lipid Disorder      Diabetes Screening:     General: Screening Not Indicated and History Diabetes      Cervical Cancer Screening:    General: Screening Not Indicated      Lung Cancer Screening:     General: Screening Not Indicated      Hepatitis C Screening:    General: Screening Current    Screening, Brief Intervention, and Referral to Treatment (SBIRT)    Screening  Typical number of drinks in a day: 0  Typical number of drinks in a week: 0  Interpretation: Low risk drinking behavior  AUDIT-C Screenin) How often did you have a drink containing alcohol in the past year? never  2) How many drinks did you have on a typical day when you were drinking in the past year? 0  3) How often did you have 6 or more drinks on one occasion in the past year? never    AUDIT-C Score: 0  Interpretation: Score 0-2 (female): Negative screen for alcohol misuse    Single Item Drug Screening:  How often have you used an illegal drug (including marijuana) or a prescription medication for non-medical reasons in the past year? never    Single Item Drug Screen Score: 0  Interpretation: Negative screen for possible drug use disorder    No exam data present     Physical Exam:     /60 (BP Location: Left arm, Patient Position: Sitting)   Pulse 73   Temp 98 5 °F (36 9 °C)   Resp 18   Ht 5' 5" (1 651 m)   Wt 102 kg (225 lb 12 8 oz)   SpO2 95%   BMI 37 58 kg/m²     Physical Exam  Vitals and nursing note reviewed  Constitutional:       General: She is not in acute distress  Appearance: She is well-developed  She is obese  HENT:      Head: Normocephalic and atraumatic        Right Ear: Tympanic membrane normal       Left Ear: Tympanic membrane normal       Mouth/Throat:      Mouth: Mucous membranes are moist    Eyes:      Extraocular Movements: Extraocular movements intact  Conjunctiva/sclera: Conjunctivae normal       Pupils: Pupils are equal, round, and reactive to light  Cardiovascular:      Rate and Rhythm: Normal rate and regular rhythm  Pulses: Normal pulses  no weak pulses          Dorsalis pedis pulses are 2+ on the right side and 2+ on the left side  Posterior tibial pulses are 2+ on the right side and 2+ on the left side  Heart sounds: Normal heart sounds  No murmur heard  Pulmonary:      Effort: Pulmonary effort is normal  No respiratory distress  Breath sounds: Normal breath sounds  Abdominal:      General: Bowel sounds are normal       Palpations: Abdomen is soft  Tenderness: There is no abdominal tenderness  Musculoskeletal:         General: Normal range of motion  Cervical back: Normal range of motion and neck supple  Feet:      Right foot:      Skin integrity: No ulcer, skin breakdown, erythema, warmth, callus or dry skin  Left foot:      Skin integrity: No ulcer, skin breakdown, erythema, warmth, callus or dry skin  Skin:     General: Skin is warm and dry  Capillary Refill: Capillary refill takes less than 2 seconds  Neurological:      General: No focal deficit present  Mental Status: She is alert and oriented to person, place, and time  Mental status is at baseline  Psychiatric:         Mood and Affect: Mood normal          Behavior: Behavior normal          Thought Content: Thought content normal          Judgment: Judgment normal         Diabetic Foot Exam    Patient's shoes and socks removed  Right Foot/Ankle   Right Foot Inspection  Skin Exam: skin normal and skin intact  No dry skin, no warmth, no callus, no erythema, no maceration, no abnormal color, no pre-ulcer, no ulcer and no callus  Toe Exam: ROM and strength within normal limits       Sensory   Vibration: diminished  Proprioception: diminished  Monofilament testing: intact    Vascular  Capillary refills: < 3 seconds  The right DP pulse is 2+  The right PT pulse is 2+  Left Foot/Ankle  Left Foot Inspection  Skin Exam: skin normal and skin intact  No dry skin, no warmth, no erythema, no maceration, normal color, no pre-ulcer, no ulcer and no callus  Toe Exam: ROM and strength within normal limits  Sensory   Vibration: diminished  Proprioception: diminished  Monofilament testing: intact    Vascular  Capillary refills: < 3 seconds  The left DP pulse is 2+  The left PT pulse is 2+       Assign Risk Category  No deformity present  No loss of protective sensation  No weak pulses  Risk: 0    Delia Tomlinson DO

## 2022-10-25 DIAGNOSIS — E11.65 UNCONTROLLED TYPE 2 DIABETES MELLITUS WITH HYPERGLYCEMIA (HCC): ICD-10-CM

## 2022-10-26 RX ORDER — METFORMIN HYDROCHLORIDE 500 MG/1
TABLET, EXTENDED RELEASE ORAL
Qty: 180 TABLET | Refills: 0 | Status: SHIPPED | OUTPATIENT
Start: 2022-10-26

## 2022-11-12 DIAGNOSIS — E78.2 MIXED HYPERLIPIDEMIA: ICD-10-CM

## 2022-11-13 RX ORDER — ATORVASTATIN CALCIUM 40 MG/1
TABLET, FILM COATED ORAL
Qty: 90 TABLET | Refills: 0 | Status: SHIPPED | OUTPATIENT
Start: 2022-11-13

## 2022-12-02 ENCOUNTER — VBI (OUTPATIENT)
Dept: ADMINISTRATIVE | Facility: OTHER | Age: 72
End: 2022-12-02

## 2022-12-27 DIAGNOSIS — F41.9 ANXIETY AND DEPRESSION: ICD-10-CM

## 2022-12-27 DIAGNOSIS — E11.65 UNCONTROLLED TYPE 2 DIABETES MELLITUS WITH HYPERGLYCEMIA (HCC): ICD-10-CM

## 2022-12-27 DIAGNOSIS — I10 ESSENTIAL HYPERTENSION: ICD-10-CM

## 2022-12-27 DIAGNOSIS — F32.A ANXIETY AND DEPRESSION: ICD-10-CM

## 2022-12-27 DIAGNOSIS — E78.2 MIXED HYPERLIPIDEMIA: ICD-10-CM

## 2022-12-27 DIAGNOSIS — Z12.31 VISIT FOR SCREENING MAMMOGRAM: ICD-10-CM

## 2022-12-27 DIAGNOSIS — I25.119 CORONARY ARTERY DISEASE WITH ANGINA PECTORIS, UNSPECIFIED VESSEL OR LESION TYPE, UNSPECIFIED WHETHER NATIVE OR TRANSPLANTED HEART (HCC): ICD-10-CM

## 2022-12-27 RX ORDER — LISINOPRIL AND HYDROCHLOROTHIAZIDE 20; 12.5 MG/1; MG/1
TABLET ORAL
Qty: 90 TABLET | Refills: 0 | Status: SHIPPED | OUTPATIENT
Start: 2022-12-27

## 2023-01-05 ENCOUNTER — HOSPITAL ENCOUNTER (EMERGENCY)
Facility: HOSPITAL | Age: 73
Discharge: HOME | End: 2023-01-05
Attending: EMERGENCY MEDICINE

## 2023-01-05 ENCOUNTER — APPOINTMENT (EMERGENCY)
Dept: RADIOLOGY | Facility: HOSPITAL | Age: 73
End: 2023-01-05

## 2023-01-05 VITALS
DIASTOLIC BLOOD PRESSURE: 74 MMHG | HEART RATE: 79 BPM | BODY MASS INDEX: 37.44 KG/M2 | OXYGEN SATURATION: 95 % | SYSTOLIC BLOOD PRESSURE: 151 MMHG | RESPIRATION RATE: 16 BRPM | TEMPERATURE: 98.1 F | WEIGHT: 225 LBS

## 2023-01-05 DIAGNOSIS — M79.605 LEFT LEG PAIN: Primary | ICD-10-CM

## 2023-01-05 DIAGNOSIS — E11.65 UNCONTROLLED DIABETES MELLITUS WITH HYPERGLYCEMIA (HCC): ICD-10-CM

## 2023-01-05 DIAGNOSIS — I83.92 VARICOSE VEINS OF LEFT LOWER EXTREMITY: ICD-10-CM

## 2023-01-05 LAB
ANION GAP SERPL CALCULATED.3IONS-SCNC: 11 MMOL/L (ref 4–13)
BASOPHILS # BLD AUTO: 0.03 THOUSANDS/ÂΜL (ref 0–0.1)
BASOPHILS NFR BLD AUTO: 1 % (ref 0–1)
BUN SERPL-MCNC: 22 MG/DL (ref 5–25)
CALCIUM SERPL-MCNC: 9.7 MG/DL (ref 8.4–10.2)
CHLORIDE SERPL-SCNC: 101 MMOL/L (ref 96–108)
CO2 SERPL-SCNC: 23 MMOL/L (ref 21–32)
CREAT SERPL-MCNC: 1.1 MG/DL (ref 0.6–1.3)
D DIMER PPP FEU-MCNC: 0.31 UG/ML FEU
EOSINOPHIL # BLD AUTO: 0.13 THOUSAND/ÂΜL (ref 0–0.61)
EOSINOPHIL NFR BLD AUTO: 3 % (ref 0–6)
ERYTHROCYTE [DISTWIDTH] IN BLOOD BY AUTOMATED COUNT: 13.2 % (ref 11.6–15.1)
GFR SERPL CREATININE-BSD FRML MDRD: 49 ML/MIN/1.73SQ M
GLUCOSE SERPL-MCNC: 360 MG/DL (ref 65–140)
HCT VFR BLD AUTO: 38.6 % (ref 34.8–46.1)
HGB BLD-MCNC: 12.5 G/DL (ref 11.5–15.4)
IMM GRANULOCYTES # BLD AUTO: 0.04 THOUSAND/UL (ref 0–0.2)
IMM GRANULOCYTES NFR BLD AUTO: 1 % (ref 0–2)
LYMPHOCYTES # BLD AUTO: 1.58 THOUSANDS/ÂΜL (ref 0.6–4.47)
LYMPHOCYTES NFR BLD AUTO: 32 % (ref 14–44)
MCH RBC QN AUTO: 28 PG (ref 26.8–34.3)
MCHC RBC AUTO-ENTMCNC: 32.4 G/DL (ref 31.4–37.4)
MCV RBC AUTO: 87 FL (ref 82–98)
MONOCYTES # BLD AUTO: 0.31 THOUSAND/ÂΜL (ref 0.17–1.22)
MONOCYTES NFR BLD AUTO: 6 % (ref 4–12)
NEUTROPHILS # BLD AUTO: 2.89 THOUSANDS/ÂΜL (ref 1.85–7.62)
NEUTS SEG NFR BLD AUTO: 57 % (ref 43–75)
NRBC BLD AUTO-RTO: 0 /100 WBCS
PLATELET # BLD AUTO: 305 THOUSANDS/UL (ref 149–390)
PMV BLD AUTO: 9 FL (ref 8.9–12.7)
POTASSIUM SERPL-SCNC: 3.8 MMOL/L (ref 3.5–5.3)
RBC # BLD AUTO: 4.46 MILLION/UL (ref 3.81–5.12)
SODIUM SERPL-SCNC: 135 MMOL/L (ref 135–147)
WBC # BLD AUTO: 4.98 THOUSAND/UL (ref 4.31–10.16)

## 2023-01-05 RX ORDER — TIZANIDINE 2 MG/1
2 TABLET ORAL 2 TIMES DAILY PRN
Qty: 10 TABLET | Refills: 0 | Status: SHIPPED | OUTPATIENT
Start: 2023-01-05

## 2023-01-05 RX ORDER — KETOROLAC TROMETHAMINE 30 MG/ML
15 INJECTION, SOLUTION INTRAMUSCULAR; INTRAVENOUS ONCE
Status: COMPLETED | OUTPATIENT
Start: 2023-01-05 | End: 2023-01-05

## 2023-01-05 RX ORDER — MELOXICAM 15 MG/1
15 TABLET ORAL DAILY
Qty: 7 TABLET | Refills: 0 | Status: SHIPPED | OUTPATIENT
Start: 2023-01-05 | End: 2023-01-12

## 2023-01-05 RX ADMIN — MORPHINE SULFATE 2 MG: 2 INJECTION, SOLUTION INTRAMUSCULAR; INTRAVENOUS at 03:13

## 2023-01-05 RX ADMIN — KETOROLAC TROMETHAMINE 15 MG: 30 INJECTION, SOLUTION INTRAMUSCULAR at 02:35

## 2023-01-05 NOTE — ED PROVIDER NOTES
History  Chief Complaint   Patient presents with   • Leg Pain      Patient states she has 6/10 pain in her left leg starting from her knee to her groin  Patient is able to ambulate independently, leg is not warm to the touch or appear to be swollen  Patient denies injury, denies being on thinners  Patient reports this started yesterday morning  17-year-old female with CAD, diabetes, and hypertension presents to the emergency department for evaluation of atraumatic left leg pain  Patient reports that the symptoms started yesterday afternoon  She denies any trauma or falls  She can ambulate without difficulty  Pain is primarily located in the left thigh and left knee  Pain is made worse with movement  She tried Tylenol without relief  She denies any associated swelling or rashes  No numbness or tingling  No weakness  She also endorses shortness of breath that has been ongoing for a year and not worsening  Is not currently anticoagulated  No history of DVT or PE  Prior to Admission Medications   Prescriptions Last Dose Informant Patient Reported? Taking?    ALPRAZolam (XANAX) 0 25 mg tablet   No No   Sig: Take 1 tablet (0 25 mg total) by mouth 2 (two) times a day as needed for anxiety   Patient not taking: No sig reported   Insulin Pen Needle 31G X 6 MM MISC   Yes No   Sig: by Does not apply route   ReliOn Ultra Thin Lancets 30G MISC   Yes No   Sig: USE 1 THREE TIMES DAILY   amLODIPine (NORVASC) 5 mg tablet   No No   Sig: Take 1 tablet (5 mg total) by mouth daily   atorvastatin (LIPITOR) 40 mg tablet   No No   Sig: Take 1 tablet by mouth once daily   insulin NPH-insulin regular (NovoLIN 70/30 ReliOn) 100 units/mL subcutaneous injection   No No   Sig: Inject 60 Units under the skin 2 (two) times a day before meals   insulin detemir (Levemir FlexTouch) 100 Units/mL injection pen   No No   Sig: Inject 10 Units under the skin daily at bedtime   Patient not taking: Reported on 10/18/2022 lisinopril-hydrochlorothiazide (PRINZIDE,ZESTORETIC) 20-12 5 MG per tablet   No No   Sig: Take 1 tablet by mouth once daily   metFORMIN (GLUCOPHAGE-XR) 500 mg 24 hr tablet   No No   Sig: Take 1 tablet by mouth twice daily   pioglitazone (ACTOS) 45 mg tablet   No No   Sig: Take 1 tablet (45 mg total) by mouth daily   Patient not taking: Reported on 10/18/2022   predniSONE 10 mg tablet   No No   Si mg by mouth daily for 3 days, then 20 mg by mouth daily for 3 days, then 10 mg by mouth daily for 3 days, then stop   Patient not taking: Reported on 10/18/2022      Facility-Administered Medications: None       Past Medical History:   Diagnosis Date   • Coronary artery disease    • Diabetes mellitus ([facility] [address] )    • Hypertension        Past Surgical History:   Procedure Laterality Date   • APPENDECTOMY     • CHOLECYSTECTOMY     • EYE SURGERY Left    • LEG SURGERY Right     vein   • TONSILLECTOMY     • VEIN SURGERY     • WRIST SURGERY Right     ganglion cyst       Family History   Problem Relation Age of Onset   • Cancer Mother    • Diabetes Father    • Hypertension Father    • Heart attack Father      I have reviewed and agree with the history as documented  E-Cigarette/Vaping   • E-Cigarette Use Never User      E-Cigarette/Vaping Substances   • Nicotine No    • THC No    • CBD No    • Flavoring No    • Other No    • Unknown No      Social History     Tobacco Use   • Smoking status: Former     Packs/day: 1 00     Years: 3 00     Pack years: 3 00     Types: Cigarettes     Start date: 0     Quit date:      Years since quittin 0   • Smokeless tobacco: Never   Vaping Use   • Vaping Use: Never used   Substance Use Topics   • Alcohol use: Not Currently   • Drug use: Never       Review of Systems   Constitutional: Negative for chills and fever  HENT: Negative for ear pain and sore throat  Eyes: Negative for pain and visual disturbance  Respiratory: Positive for shortness of breath (ongoing for 1 year)  Negative for cough  Cardiovascular: Negative for chest pain and palpitations  Gastrointestinal: Negative for abdominal pain and vomiting  Genitourinary: Negative for dysuria and hematuria  Musculoskeletal: Positive for arthralgias  Negative for back pain  Skin: Negative for color change and rash  Neurological: Negative for seizures and syncope  All other systems reviewed and are negative  Physical Exam  Physical Exam  Vitals and nursing note reviewed  Constitutional:       General: She is not in acute distress  HENT:      Head: Normocephalic and atraumatic  Right Ear: External ear normal       Left Ear: External ear normal       Nose: Nose normal       Mouth/Throat:      Mouth: Mucous membranes are moist    Eyes:      Extraocular Movements: Extraocular movements intact  Conjunctiva/sclera: Conjunctivae normal       Pupils: Pupils are equal, round, and reactive to light  Cardiovascular:      Rate and Rhythm: Normal rate and regular rhythm  Pulses: Normal pulses  Heart sounds: Normal heart sounds  No murmur heard  Pulmonary:      Effort: Pulmonary effort is normal  No respiratory distress  Breath sounds: Normal breath sounds  No stridor  Musculoskeletal:         General: Tenderness (left thigh and knee, no joint laxity, no effusion) present  No deformity  Normal range of motion  Cervical back: Normal range of motion and neck supple  Right lower leg: No edema  Left lower leg: No edema  Skin:     General: Skin is warm and dry  Capillary Refill: Capillary refill takes less than 2 seconds  Findings: No bruising or erythema  Comments: Varicose veins present, no asymmetric swelling    Neurological:      General: No focal deficit present  Mental Status: She is alert and oriented to person, place, and time  Sensory: No sensory deficit     Psychiatric:         Mood and Affect: Mood normal          Behavior: Behavior normal  Vital Signs  ED Triage Vitals   Temperature Pulse Respirations Blood Pressure SpO2   01/05/23 0156 01/05/23 0156 01/05/23 0156 01/05/23 0156 01/05/23 0156   98 1 °F (36 7 °C) 82 16 135/78 98 %      Temp Source Heart Rate Source Patient Position - Orthostatic VS BP Location FiO2 (%)   01/05/23 0156 01/05/23 0156 01/05/23 0156 01/05/23 0156 --   Tympanic Monitor Sitting Left arm       Pain Score       01/05/23 0353       4           Vitals:    01/05/23 0156 01/05/23 0330   BP: 135/78 151/74   Pulse: 82 79   Patient Position - Orthostatic VS: Sitting          Visual Acuity      ED Medications  Medications   ketorolac (TORADOL) injection 15 mg (15 mg Intravenous Given 1/5/23 0235)   morphine injection 2 mg (2 mg Intravenous Given 1/5/23 0313)       Diagnostic Studies  Results Reviewed     Procedure Component Value Units Date/Time    Basic metabolic panel [[id number]]  (Abnormal) Collected: 01/05/23 0218    Lab Status: Final result Specimen: Blood Updated: 01/05/23 0301     Sodium 135 mmol/L      Potassium 3 8 mmol/L      Chloride 101 mmol/L      CO2 23 mmol/L      ANION GAP 11 mmol/L      BUN 22 mg/dL      Creatinine 1 10 mg/dL      Glucose 360 mg/dL      Calcium 9 7 mg/dL      eGFR 49 ml/min/1 73sq m     Narrative:      [place] guidelines for Chronic Kidney Disease (CKD):   •  Stage 1 with normal or high GFR (GFR > 90 mL/min/1 73 square meters)  •  Stage 2 Mild CKD (GFR = 60-89 mL/min/1 73 square meters)  •  Stage 3A Moderate CKD (GFR = 45-59 mL/min/1 73 square meters)  •  Stage 3B Moderate CKD (GFR = 30-44 mL/min/1 73 square meters)  •  Stage 4 Severe CKD (GFR = 15-29 mL/min/1 73 square meters)  •  Stage 5 End Stage CKD (GFR <15 mL/min/1 73 square meters)  Note: GFR calculation is accurate only with a steady state creatinine    D-Dimer [[id number]]  (Normal) Collected: 01/05/23 0218    Lab Status: Final result Specimen: Blood Updated: 01/05/23 0257     D-Dimer, Quant 0 31 ug/ml FEU Narrative: In the evaluation for possible pulmonary embolism, in the appropriate (Well's Score of 4 or less) patient, the age adjusted d-dimer cutoff for this patient can be calculated as:    Age x 0 01 (in ug/mL) for Age-adjusted D-dimer exclusion threshold for a patient over 50 years  CBC and differential [[id number]] Collected: 01/05/23 0218    Lab Status: Final result Specimen: Blood Updated: 01/05/23 0244     WBC 4 98 Thousand/uL      RBC 4 46 Million/uL      Hemoglobin 12 5 g/dL      Hematocrit 38 6 %      MCV 87 fL      MCH 28 0 pg      MCHC 32 4 g/dL      RDW 13 2 %      MPV 9 0 fL      Platelets 897 Thousands/uL      nRBC 0 /100 WBCs      Neutrophils Relative 57 %      Immat GRANS % 1 %      Lymphocytes Relative 32 %      Monocytes Relative 6 %      Eosinophils Relative 3 %      Basophils Relative 1 %      Neutrophils Absolute 2 89 Thousands/µL      Immature Grans Absolute 0 04 Thousand/uL      Lymphocytes Absolute 1 58 Thousands/µL      Monocytes Absolute 0 31 Thousand/µL      Eosinophils Absolute 0 13 Thousand/µL      Basophils Absolute 0 03 Thousands/µL     Narrative: This is an appended report  These results have been appended to a previously verified report  XR knee 4+ vw left injury   ED Interpretation by Gerry Bess MD (01/05 9673)   No acute osseous abnormality                 Procedures  Procedures         ED Course  ED Course as of 01/05/23 0459   Thu Jan 05, 2023   0258 D-Dimer, Quant: 0 31                               SBIRT 20yo+    Flowsheet Row Most Recent Value   SBIRT (25 yo +)    In order to provide better care to our patients, we are screening all of our patients for alcohol and drug use  Would it be okay to ask you these screening questions?  No Filed at: 01/05/2023 1257            Chace' Criteria for DVT    Flowsheet Row Most Recent Value   Chace' Criteria for DVT    Active cancer Treatment or palliation within 6 months 0 Filed at: 01/05/2023 1096 Bedridden recently >3 days or major surgery within 12 weeks 0 Filed at: 01/05/2023 0259   Calf swelling >3 cm compared to the other leg 0 Filed at: 01/05/2023 9838   Entire leg swollen 0 Filed at: 01/05/2023 5519   Collateral (nonvaricose) superficial veins present 0 Filed at: 01/05/2023 0259   Localized tenderness along the deep venous system 0 Filed at: 01/05/2023 0405   Pitting edema, confined to symptomatic leg 0 Filed at: 01/05/2023 0259   Paralysis, paresis, or recent plaster immobilization of the lower extremity 0 Filed at: 01/05/2023 3798   Previously documented DVT 0 Filed at: 01/05/2023 5122   Alternative diagnosis to DVT as likely or more likely 0 Filed at: 01/05/2023 0573   Wells DVT Critera Score 0 Filed at: 01/05/2023 4628              Medical Decision Making  25-year-old female presents to the emergency department for evaluation of atraumatic left lower extremity pain  On exam, she is resting comfortably in bed in no acute distress  Lower extremities well perfused and neurosensory intact  Normal range of motion  No asymmetric swelling  Does have varicose veins  Palpable DP and PT pulses  Bedside ultrasound negative for any large clot in the femoral vein  Will treat with Toradol, check basic labs including D-dimer  Unable to get formal duplex scan at this time  Labs are grossly unremarkable  Patient still complaining of pain following Toradol, now complaining primarily of knee pain  Will get x-ray to further evaluate  X-ray was negative per my interpretation for any acute findings  On repeat evaluation, patient symptoms improved  Suspect symptoms likely musculoskeletal in nature  Will discharge home with orthopedic and PCP follow-up  Patient given prescription for muscle relaxer and Mobic  She was given strict return precautions      Left leg pain: acute illness or injury  Uncontrolled diabetes mellitus with hyperglycemia ([facility] [address] ): acute illness or injury  Varicose veins of left lower extremity: acute illness or injury  Amount and/or Complexity of Data Reviewed  Labs: ordered  Decision-making details documented in ED Course  Radiology: ordered and independent interpretation performed  Risk  Prescription drug management  Disposition  Final diagnoses:   Uncontrolled diabetes mellitus with hyperglycemia ([facility] [address] )   Left leg pain   Varicose veins of left lower extremity     Time reflects when diagnosis was documented in both MDM as applicable and the Disposition within this note     Time User Action Codes Description Comment    1/5/2023  3:03 AM Check, Blease Arch Add [E11 65] Uncontrolled diabetes mellitus with hyperglycemia ([facility] [address] )     1/5/2023  3:03 AM Check, Blease Arch Add [M79 605] Left leg pain     1/5/2023  3:03 AM Check, Blease Arch Add [I83 92] Varicose veins of left lower extremity     1/5/2023  3:03 AM Check, Blease Arch Modify [E11 65] Uncontrolled diabetes mellitus with hyperglycemia ([facility] [address] )     1/5/2023  3:03 AM Check, Blease Arch Modify [M79 605] Left leg pain       ED Disposition     ED Disposition   Discharge    Condition   Stable    Date/Time   Thu Jan 5, 2023  3:48 AM    Comment   Wai Rivera discharge to home/self care                 Follow-up Information     Follow up With Specialties Details Why Contact Info Additional [address] Emergency Department Emergency Medicine  If symptoms worsen [address]  [place] [id number] [phone] [facility], [address] [place] [address] Ingrid Quezada, [address] [facility]    Enoc Alfonso DO Family Medicine Schedule an appointment as soon as possible for a visit   [address] [facility] [id number] [place] [address] Specialists Ingrid das Orthopedic Surgery Schedule an appointment as soon as possible for a visit   [address] [facility] [address] [address]  [address] [place] [address] Specialists Tesfaye Potts, [address] [place], [place], [address]          Discharge Medication List as of 1/5/2023  3:52 AM      START taking these medications    Details   meloxicam (Mobic) 15 mg tablet Take 1 tablet (15 mg total) by mouth daily for 7 days, Starting Thu 1/5/2023, Until Thu 1/12/2023, Normal      tiZANidine (ZANAFLEX) 2 mg tablet Take 1 tablet (2 mg total) by mouth 2 (two) times a day as needed for muscle spasms for up to 10 doses, Starting Thu 1/5/2023, Normal         CONTINUE these medications which have NOT CHANGED    Details   ALPRAZolam (XANAX) 0 25 mg tablet Take 1 tablet (0 25 mg total) by mouth 2 (two) times a day as needed for anxiety, Starting Wed 10/13/2021, Normal      amLODIPine (NORVASC) 5 mg tablet Take 1 tablet (5 mg total) by mouth daily, Starting Thu 6/16/2022, Normal      atorvastatin (LIPITOR) 40 mg tablet Take 1 tablet by mouth once daily, Normal      insulin detemir (Levemir FlexTouch) 100 Units/mL injection pen Inject 10 Units under the skin daily at bedtime, Starting Mon 8/8/2022, Normal      insulin NPH-insulin regular (NovoLIN 70/30 ReliOn) 100 units/mL subcutaneous injection Inject 60 Units under the skin 2 (two) times a day before meals, Starting Wed 10/13/2021, Normal      Insulin Pen Needle 31G X 6 MM MISC by Does not apply route, Historical Med      lisinopril-hydrochlorothiazide (PRINZIDE,ZESTORETIC) 20-12 5 MG per tablet Take 1 tablet by mouth once daily, Normal      metFORMIN (GLUCOPHAGE-XR) 500 mg 24 hr tablet Take 1 tablet by mouth twice daily, Normal      pioglitazone (ACTOS) 45 mg tablet Take 1 tablet (45 mg total) by mouth daily, Starting Thu 8/11/2022, Normal      predniSONE 10 mg tablet 30 mg by mouth daily for 3 days, then 20 mg by mouth daily for 3 days, then 10 mg by mouth daily for 3 days, then stop, Normal      ReliOn Ultra Thin Lancets 30G MISC USE 1 THREE TIMES DAILY, Historical Med             No discharge procedures on file     PDMP Review     None          ED Provider  Electronically Signed by           Mamie Young MD  01/05/23 6685

## 2023-01-05 NOTE — DISCHARGE INSTRUCTIONS
Take Mobic and tizanidine as prescribed    Follow-up with primary care physician and orthopedic surgery    Return for any worsening symptoms including worsening pain, any weakness in your lower extremity, difficulty ambulating, fevers, or any color change

## 2023-01-12 DIAGNOSIS — I10 ESSENTIAL HYPERTENSION: ICD-10-CM

## 2023-01-12 RX ORDER — AMLODIPINE BESYLATE 5 MG/1
TABLET ORAL
Qty: 90 TABLET | Refills: 0 | Status: SHIPPED | OUTPATIENT
Start: 2023-01-12

## 2023-01-15 DIAGNOSIS — E11.65 UNCONTROLLED TYPE 2 DIABETES MELLITUS WITH HYPERGLYCEMIA (HCC): ICD-10-CM

## 2023-01-16 RX ORDER — HUMAN INSULIN 100 [IU]/ML
INJECTION, SUSPENSION SUBCUTANEOUS
Qty: 100 ML | Refills: 0 | Status: SHIPPED | OUTPATIENT
Start: 2023-01-16

## 2023-01-17 DIAGNOSIS — E11.65 UNCONTROLLED TYPE 2 DIABETES MELLITUS WITH HYPERGLYCEMIA (HCC): ICD-10-CM

## 2023-01-17 RX ORDER — METFORMIN HYDROCHLORIDE 500 MG/1
TABLET, EXTENDED RELEASE ORAL
Qty: 180 TABLET | Refills: 0 | Status: SHIPPED | OUTPATIENT
Start: 2023-01-17

## 2023-02-09 DIAGNOSIS — E78.2 MIXED HYPERLIPIDEMIA: ICD-10-CM

## 2023-02-09 RX ORDER — ATORVASTATIN CALCIUM 40 MG/1
TABLET, FILM COATED ORAL
Qty: 90 TABLET | Refills: 0 | Status: SHIPPED | OUTPATIENT
Start: 2023-02-09

## 2023-02-24 ENCOUNTER — APPOINTMENT (OUTPATIENT)
Dept: LAB | Facility: CLINIC | Age: 73
End: 2023-02-24

## 2023-02-24 DIAGNOSIS — E11.65 UNCONTROLLED TYPE 2 DIABETES MELLITUS WITH HYPERGLYCEMIA (HCC): ICD-10-CM

## 2023-02-24 DIAGNOSIS — I10 ESSENTIAL HYPERTENSION: ICD-10-CM

## 2023-02-24 DIAGNOSIS — I25.119 CORONARY ARTERY DISEASE WITH ANGINA PECTORIS, UNSPECIFIED VESSEL OR LESION TYPE, UNSPECIFIED WHETHER NATIVE OR TRANSPLANTED HEART (HCC): ICD-10-CM

## 2023-02-24 DIAGNOSIS — E78.2 MIXED HYPERLIPIDEMIA: ICD-10-CM

## 2023-02-24 LAB
BASOPHILS # BLD AUTO: 0.04 THOUSANDS/ÂΜL (ref 0–0.1)
BASOPHILS NFR BLD AUTO: 1 % (ref 0–1)
CREAT UR-MCNC: 100 MG/DL
EOSINOPHIL # BLD AUTO: 0.2 THOUSAND/ÂΜL (ref 0–0.61)
EOSINOPHIL NFR BLD AUTO: 4 % (ref 0–6)
ERYTHROCYTE [DISTWIDTH] IN BLOOD BY AUTOMATED COUNT: 13.9 % (ref 11.6–15.1)
HCT VFR BLD AUTO: 40.1 % (ref 34.8–46.1)
HGB BLD-MCNC: 12.4 G/DL (ref 11.5–15.4)
IMM GRANULOCYTES # BLD AUTO: 0.03 THOUSAND/UL (ref 0–0.2)
IMM GRANULOCYTES NFR BLD AUTO: 1 % (ref 0–2)
LYMPHOCYTES # BLD AUTO: 2.1 THOUSANDS/ÂΜL (ref 0.6–4.47)
LYMPHOCYTES NFR BLD AUTO: 43 % (ref 14–44)
MCH RBC QN AUTO: 27 PG (ref 26.8–34.3)
MCHC RBC AUTO-ENTMCNC: 30.9 G/DL (ref 31.4–37.4)
MCV RBC AUTO: 87 FL (ref 82–98)
MICROALBUMIN UR-MCNC: 27.5 MG/L (ref 0–20)
MICROALBUMIN/CREAT 24H UR: 28 MG/G CREATININE (ref 0–30)
MONOCYTES # BLD AUTO: 0.27 THOUSAND/ÂΜL (ref 0.17–1.22)
MONOCYTES NFR BLD AUTO: 6 % (ref 4–12)
NEUTROPHILS # BLD AUTO: 2.29 THOUSANDS/ÂΜL (ref 1.85–7.62)
NEUTS SEG NFR BLD AUTO: 45 % (ref 43–75)
NRBC BLD AUTO-RTO: 0 /100 WBCS
PLATELET # BLD AUTO: 332 THOUSANDS/UL (ref 149–390)
PMV BLD AUTO: 9.5 FL (ref 8.9–12.7)
RBC # BLD AUTO: 4.6 MILLION/UL (ref 3.81–5.12)
WBC # BLD AUTO: 4.93 THOUSAND/UL (ref 4.31–10.16)

## 2023-02-25 LAB
ALBUMIN SERPL BCP-MCNC: 3.8 G/DL (ref 3.5–5)
ALP SERPL-CCNC: 53 U/L (ref 46–116)
ALT SERPL W P-5'-P-CCNC: 23 U/L (ref 12–78)
ANION GAP SERPL CALCULATED.3IONS-SCNC: 7 MMOL/L (ref 4–13)
AST SERPL W P-5'-P-CCNC: 15 U/L (ref 5–45)
BILIRUB SERPL-MCNC: 0.79 MG/DL (ref 0.2–1)
BUN SERPL-MCNC: 20 MG/DL (ref 5–25)
CALCIUM SERPL-MCNC: 9.7 MG/DL (ref 8.3–10.1)
CHLORIDE SERPL-SCNC: 107 MMOL/L (ref 96–108)
CHOLEST SERPL-MCNC: 100 MG/DL
CO2 SERPL-SCNC: 25 MMOL/L (ref 21–32)
CREAT SERPL-MCNC: 0.92 MG/DL (ref 0.6–1.3)
EST. AVERAGE GLUCOSE BLD GHB EST-MCNC: 235 MG/DL
GFR SERPL CREATININE-BSD FRML MDRD: 61 ML/MIN/1.73SQ M
GLUCOSE P FAST SERPL-MCNC: 197 MG/DL (ref 65–99)
HBA1C MFR BLD: 9.8 %
HDLC SERPL-MCNC: 22 MG/DL
LDLC SERPL CALC-MCNC: 49 MG/DL (ref 0–100)
POTASSIUM SERPL-SCNC: 4.2 MMOL/L (ref 3.5–5.3)
PROT SERPL-MCNC: 7.2 G/DL (ref 6.4–8.4)
SODIUM SERPL-SCNC: 139 MMOL/L (ref 135–147)
TRIGL SERPL-MCNC: 145 MG/DL
TSH SERPL DL<=0.05 MIU/L-ACNC: 3.72 UIU/ML (ref 0.45–4.5)

## 2023-03-30 DIAGNOSIS — I10 ESSENTIAL HYPERTENSION: ICD-10-CM

## 2023-03-30 DIAGNOSIS — I25.119 CORONARY ARTERY DISEASE WITH ANGINA PECTORIS, UNSPECIFIED VESSEL OR LESION TYPE, UNSPECIFIED WHETHER NATIVE OR TRANSPLANTED HEART (HCC): ICD-10-CM

## 2023-03-30 DIAGNOSIS — E11.65 UNCONTROLLED TYPE 2 DIABETES MELLITUS WITH HYPERGLYCEMIA (HCC): ICD-10-CM

## 2023-03-30 DIAGNOSIS — F41.9 ANXIETY AND DEPRESSION: ICD-10-CM

## 2023-03-30 DIAGNOSIS — F32.A ANXIETY AND DEPRESSION: ICD-10-CM

## 2023-03-30 DIAGNOSIS — E78.2 MIXED HYPERLIPIDEMIA: ICD-10-CM

## 2023-03-30 DIAGNOSIS — Z12.31 VISIT FOR SCREENING MAMMOGRAM: ICD-10-CM

## 2023-03-30 RX ORDER — LISINOPRIL AND HYDROCHLOROTHIAZIDE 20; 12.5 MG/1; MG/1
TABLET ORAL
Qty: 90 TABLET | Refills: 0 | Status: SHIPPED | OUTPATIENT
Start: 2023-03-30

## 2023-04-03 DIAGNOSIS — I10 ESSENTIAL HYPERTENSION: ICD-10-CM

## 2023-04-03 RX ORDER — AMLODIPINE BESYLATE 5 MG/1
5 TABLET ORAL DAILY
Qty: 90 TABLET | Refills: 0 | Status: SHIPPED | OUTPATIENT
Start: 2023-04-03

## 2023-04-04 DIAGNOSIS — E11.65 UNCONTROLLED TYPE 2 DIABETES MELLITUS WITH HYPERGLYCEMIA (HCC): ICD-10-CM

## 2023-04-04 RX ORDER — HUMAN INSULIN 100 [IU]/ML
75 INJECTION, SUSPENSION SUBCUTANEOUS
Qty: 100 ML | Refills: 0 | Status: SHIPPED | OUTPATIENT
Start: 2023-04-04

## 2023-04-14 LAB
LEFT EYE DIABETIC RETINOPATHY: NORMAL
RIGHT EYE DIABETIC RETINOPATHY: NORMAL

## 2023-04-18 PROBLEM — R39.81 FUNCTIONAL URINARY INCONTINENCE: Status: ACTIVE | Noted: 2023-04-18

## 2023-04-19 ENCOUNTER — TELEPHONE (OUTPATIENT)
Dept: ADMINISTRATIVE | Facility: OTHER | Age: 73
End: 2023-04-19

## 2023-04-19 NOTE — TELEPHONE ENCOUNTER
----- Message from Larissa Beaver MA sent at 4/18/2023  1:04 PM EDT -----  Regarding: DM EYE  04/18/23 1:05 PM    Hello, our patient Michelle Sutton has had Diabetic Eye Exam completed/performed   Please assist in updating the patient chart by making an External outreach to [address] [place] [address] located in [facility] The date of service is 2023    Thank you,  ROBERT Silver PG

## 2023-04-19 NOTE — LETTER
Diabetic Eye Exam Form    Date Requested: 23  Patient: Rosalino Gillis  Patient : 1950   Referring Provider: Danish Luke DO      DIABETIC Eye Exam Date _______________________________      Type of Exam MUST be documented for Diabetic Eye Exams  Please CHECK ONE  Retinal Exam       Dilated Retinal Exam       OCT       Optomap-Iris Exam      Fundus Photography       Left Eye - Please check Retinopathy or No Retinopathy        Exam did show retinopathy    Exam did not show retinopathy       Right Eye - Please check Retinopathy or No Retinopathy       Exam did show retinopathy    Exam did not show retinopathy       Comments __________________________________________________________    Practice Providing Exam ______________________________________________    Exam Performed By (print name) _______________________________________      Provider Signature ___________________________________________________      These reports are needed for  compliance  Please fax this completed form and a copy of the Diabetic Eye Exam report to our office located at [place] [address] as soon as possible via Fax [phone] attention Ayesha Fleischer: Phone [phone]  We thank you for your assistance in treating our mutual patient

## 2023-04-19 NOTE — LETTER
Diabetic Eye Exam Form    Date Requested: 23  Patient: Holly Rios  Patient : 1950   Referring Provider: Eugenio Tellez DO      DIABETIC Eye Exam Date _______________________________      Type of Exam MUST be documented for Diabetic Eye Exams  Please CHECK ONE  Retinal Exam       Dilated Retinal Exam       OCT       Optomap-Iris Exam      Fundus Photography       Left Eye - Please check Retinopathy or No Retinopathy        Exam did show retinopathy    Exam did not show retinopathy       Right Eye - Please check Retinopathy or No Retinopathy       Exam did show retinopathy    Exam did not show retinopathy       Comments __________________________________________________________    Practice Providing Exam ______________________________________________    Exam Performed By (print name) _______________________________________      Provider Signature ___________________________________________________      These reports are needed for  compliance  Please fax this completed form and a copy of the Diabetic Eye Exam report to our office located at [place] [address] as soon as possible via Fax [phone] chantel Gutierrez Pea: Phone [phone]  We thank you for your assistance in treating our mutual patient

## 2023-05-04 NOTE — TELEPHONE ENCOUNTER
Upon review of the In Basket request we were able to locate, review, and update the patient chart as requested for Diabetic Eye Exam     Any additional questions or concerns should be emailed to the Practice Liaisons via the appropriate education email address, please do not reply via In Basket      Thank you  Marika Limon MA

## 2023-05-08 DIAGNOSIS — E78.2 MIXED HYPERLIPIDEMIA: ICD-10-CM

## 2023-05-08 RX ORDER — ATORVASTATIN CALCIUM 40 MG/1
40 TABLET, FILM COATED ORAL DAILY
Qty: 90 TABLET | Refills: 0 | Status: SHIPPED | OUTPATIENT
Start: 2023-05-08

## 2023-06-20 ENCOUNTER — RA CDI HCC (OUTPATIENT)
Dept: OTHER | Facility: HOSPITAL | Age: 73
End: 2023-06-20

## 2023-06-21 NOTE — PROGRESS NOTES
[facility]  coding opportunities       Chart reviewed, no opportunity found: CHART REVIEWED, Luiz Dominguez 7466     Patients Insurance     Medicare Insurance: [employer]

## 2023-06-22 ENCOUNTER — OFFICE VISIT (OUTPATIENT)
Dept: FAMILY MEDICINE CLINIC | Facility: CLINIC | Age: 73
End: 2023-06-22
Payer: COMMERCIAL

## 2023-06-22 VITALS
HEART RATE: 77 BPM | SYSTOLIC BLOOD PRESSURE: 110 MMHG | OXYGEN SATURATION: 97 % | DIASTOLIC BLOOD PRESSURE: 60 MMHG | BODY MASS INDEX: 38.25 KG/M2 | WEIGHT: 229.6 LBS | RESPIRATION RATE: 20 BRPM | TEMPERATURE: 96.7 F | HEIGHT: 65 IN

## 2023-06-22 DIAGNOSIS — Z12.31 VISIT FOR SCREENING MAMMOGRAM: Primary | ICD-10-CM

## 2023-06-22 DIAGNOSIS — F33.9 DEPRESSION, RECURRENT (HCC): ICD-10-CM

## 2023-06-22 DIAGNOSIS — E11.3553 TYPE 2 DIABETES MELLITUS WITH STABLE PROLIFERATIVE RETINOPATHY OF BOTH EYES, WITH LONG-TERM CURRENT USE OF INSULIN (HCC): ICD-10-CM

## 2023-06-22 DIAGNOSIS — E78.2 MIXED HYPERLIPIDEMIA: ICD-10-CM

## 2023-06-22 DIAGNOSIS — Z79.4 TYPE 2 DIABETES MELLITUS WITH STABLE PROLIFERATIVE RETINOPATHY OF BOTH EYES, WITH LONG-TERM CURRENT USE OF INSULIN (HCC): ICD-10-CM

## 2023-06-22 DIAGNOSIS — F41.9 ANXIETY AND DEPRESSION: ICD-10-CM

## 2023-06-22 DIAGNOSIS — I95.89 HYPOTENSION DUE TO HYPOVOLEMIA: ICD-10-CM

## 2023-06-22 DIAGNOSIS — Z01.818 PREOPERATIVE EXAMINATION: ICD-10-CM

## 2023-06-22 DIAGNOSIS — I10 ESSENTIAL HYPERTENSION: ICD-10-CM

## 2023-06-22 DIAGNOSIS — H25.812 COMBINED FORMS OF AGE-RELATED CATARACT OF LEFT EYE: ICD-10-CM

## 2023-06-22 DIAGNOSIS — E86.1 HYPOTENSION DUE TO HYPOVOLEMIA: ICD-10-CM

## 2023-06-22 DIAGNOSIS — N28.9 RENAL INSUFFICIENCY: ICD-10-CM

## 2023-06-22 DIAGNOSIS — I25.119 CORONARY ARTERY DISEASE WITH ANGINA PECTORIS, UNSPECIFIED VESSEL OR LESION TYPE, UNSPECIFIED WHETHER NATIVE OR TRANSPLANTED HEART (HCC): ICD-10-CM

## 2023-06-22 DIAGNOSIS — F32.A ANXIETY AND DEPRESSION: ICD-10-CM

## 2023-06-22 DIAGNOSIS — E66.01 OBESITY, MORBID (HCC): ICD-10-CM

## 2023-06-22 DIAGNOSIS — E11.65 UNCONTROLLED TYPE 2 DIABETES MELLITUS WITH HYPERGLYCEMIA (HCC): ICD-10-CM

## 2023-06-22 PROCEDURE — 99215 OFFICE O/P EST HI 40 MIN: CPT | Performed by: FAMILY MEDICINE

## 2023-06-22 RX ORDER — AMLODIPINE BESYLATE 5 MG/1
5 TABLET ORAL DAILY
Qty: 90 TABLET | Refills: 0 | Status: SHIPPED | OUTPATIENT
Start: 2023-06-22

## 2023-06-22 RX ORDER — METFORMIN HYDROCHLORIDE 500 MG/1
500 TABLET, EXTENDED RELEASE ORAL 2 TIMES DAILY
Qty: 180 TABLET | Refills: 0 | Status: SHIPPED | OUTPATIENT
Start: 2023-06-22

## 2023-06-22 RX ORDER — HUMAN INSULIN 100 [IU]/ML
75 INJECTION, SUSPENSION SUBCUTANEOUS
Qty: 100 ML | Refills: 0 | Status: SHIPPED | OUTPATIENT
Start: 2023-06-22

## 2023-06-22 RX ORDER — LISINOPRIL AND HYDROCHLOROTHIAZIDE 20; 12.5 MG/1; MG/1
1 TABLET ORAL DAILY
Qty: 90 TABLET | Refills: 0 | Status: SHIPPED | OUTPATIENT
Start: 2023-06-22

## 2023-06-22 NOTE — PROGRESS NOTES
FAMILY MEDICINE PRE-OPERATIVE EVALUATION  [facility] PHYSICIAN GROUP - [facility] [address] [facility] PRACTICE    NAME: Marylu Patterson  AGE: 74 y o  SEX: female  : 1950     DATE: 2023     Family Medicine Pre-Operative Evaluation:     Chief Complaint: Pre-operative Evaluation     Surgery: Cataract surgery left eye  Anticipated Date of Surgery: 2023  Referring Provider: Mario Hector MD        History of Present Illness:     Marylu Patterson is a 74 y o  female who presents to the office today for a preoperative consultation at the request of surgeon, Mario Hector MD, who plans on performing left eye cataract surgery on 2023  Planned anesthesia is IV sedation  Patient has a bleeding risk of: no recent abnormal bleeding  Patient does not have objections to receiving blood products if needed  Current anti-platelet/anti-coagulation medications that the patient is prescribed includes: None     Assessment of Chronic Conditions:   Reviewed preoperative conditions patient medically stable         Review of Systems:     Review of Systems   Constitutional: Negative for chills, fatigue and fever  HENT: Negative for congestion, nosebleeds, rhinorrhea, sinus pressure and sore throat  Eyes: Negative for discharge and redness  Respiratory: Negative for cough and shortness of breath  Cardiovascular: Negative for chest pain, palpitations and leg swelling  Gastrointestinal: Negative for abdominal pain, blood in stool and nausea  Endocrine: Negative for cold intolerance, heat intolerance and polyuria  Genitourinary: Negative for dysuria and frequency  Musculoskeletal: Negative for arthralgias, back pain and myalgias  Skin: Negative for rash  Neurological: Negative for dizziness, weakness and headaches  Hematological: Negative for adenopathy  Psychiatric/Behavioral: Negative for behavioral problems and sleep disturbance  The patient is not nervous/anxious           Problem List: Patient Active Problem List   Diagnosis   • Mixed hyperlipidemia   • Essential hypertension   • Uncontrolled type 2 diabetes mellitus with hyperglycemia ([place] [address] )   • Breast cancer screening   • Anxiety and depression   • Perianal abscess   • Coronary artery disease with angina pectoris ([facility])   • Paronychia of great toe of left foot   • Diabetic ketoacidosis without coma associated with type 2 diabetes mellitus ([facility] )   • Fracture of right humerus with routine healing   • History of fall   • Medicare annual wellness visit, subsequent   • Syncope and collapse   • COVID-19 virus infection   • Renal insufficiency   • Hypotension, unspecified   • Left against medical advice   • Depression, recurrent ([place] [address] )   • Obesity, morbid ([place] [address] )   • Type 2 diabetes mellitus with stable proliferative retinopathy of both eyes, with long-term current use of insulin ([facility])   • Poison ivy   • Functional urinary incontinence   • Preoperative examination   • Combined forms of age-related cataract of left eye        Allergies:      Allergies   Allergen Reactions   • Ciprofloxacin    • Other      Nitro TD Patch   • Simvastatin Hives        Current Medications:       Current Outpatient Medications:   •  amLODIPine (NORVASC) 5 mg tablet, Take 1 tablet (5 mg total) by mouth daily, Disp: 90 tablet, Rfl: 0  •  atorvastatin (LIPITOR) 40 mg tablet, Take 1 tablet (40 mg total) by mouth daily, Disp: 90 tablet, Rfl: 0  •  insulin NPH-insulin regular (NovoLIN 70/30 ReliOn) 100 units/mL subcutaneous injection, Inject 75 Units under the skin 2 (two) times a day before meals, Disp: 100 mL, Rfl: 0  •  Insulin Pen Needle 31G X 6 MM MISC, by Does not apply route, Disp: , Rfl:   •  lisinopril-hydrochlorothiazide (PRINZIDE,ZESTORETIC) 20-12 5 MG per tablet, Take 1 tablet by mouth once daily, Disp: 90 tablet, Rfl: 0  •  metFORMIN (GLUCOPHAGE-XR) 500 mg 24 hr tablet, Take 1 tablet (500 mg total) by mouth 2 (two) times a day, Disp: 180 tablet, Rfl: 0  •  ReliOn Ultra Thin Lancets 30G MISC, USE 1 THREE TIMES DAILY, Disp: , Rfl:   •  tiZANidine (ZANAFLEX) 2 mg tablet, Take 1 tablet (2 mg total) by mouth 2 (two) times a day as needed for muscle spasms for up to 10 doses, Disp: 10 tablet, Rfl: 0  •  meloxicam (Mobic) 15 mg tablet, Take 1 tablet (15 mg total) by mouth daily for 7 days, Disp: 7 tablet, Rfl: 0  •  Mirabegron ER 25 MG TB24, Take 25 mg by mouth in the morning (Patient not taking: Reported on 2023), Disp: 30 tablet, Rfl: 5     Past History:     Past Medical History:   Diagnosis Date   • Coronary artery disease    • Diabetes mellitus ([facility] [address] )    • Hypertension         Past Surgical History:   Procedure Laterality Date   • APPENDECTOMY     • CHOLECYSTECTOMY     • EYE SURGERY Left    • LEG SURGERY Right     vein   • TONSILLECTOMY     • VEIN SURGERY     • WRIST SURGERY Right     ganglion cyst        Family History   Problem Relation Age of Onset   • Cancer Mother    • Diabetes Father    • Hypertension Father    • Heart attack Father         Social History     Socioeconomic History   • Marital status:       Spouse name: Not on file   • Number of children: Not on file   • Years of education: Not on file   • Highest education level: Not on file   Occupational History   • Not on file   Tobacco Use   • Smoking status: Former     Packs/day: 1 00     Years: 3 00     Total pack years: 3 00     Types: Cigarettes     Start date: 0     Quit date: 65     Years since quittin 5   • Smokeless tobacco: Never   Vaping Use   • Vaping Use: Never used   Substance and Sexual Activity   • Alcohol use: Not Currently   • Drug use: Never   • Sexual activity: Not on file   Other Topics Concern   • Not on file   Social History Narrative   • Not on file     Social Determinants of Health     Financial Resource Strain: Low Risk  (10/18/2022)    Overall Financial Resource Strain (CARDIA)    • Difficulty of Paying Living Expenses: Not hard at all   Food Insecurity: Not on file "  Transportation Needs: No Transportation Needs (10/18/2022)    PRAPARE - Transportation    • Lack of Transportation (Medical): No    • Lack of Transportation (Non-Medical): No   Physical Activity: Not on file   Stress: Not on file   Social Connections: Not on file   Intimate Partner Violence: Not on file   Housing Stability: Not on file        Physical Exam:      /60 (BP Location: Left arm, Patient Position: Sitting, Cuff Size: Standard)   Pulse 77   Temp (!) 96 7 °F (35 9 °C) (Tympanic)   Resp 20   Ht 5' 5\" (1 651 m)   Wt 104 kg (229 lb 9 6 oz)   SpO2 97%   BMI 38 21 kg/m²     Physical Exam  Vitals and nursing note reviewed  Constitutional:       General: She is not in acute distress  Appearance: Normal appearance  She is well-developed  She is obese  HENT:      Head: Normocephalic and atraumatic  Right Ear: Tympanic membrane, ear canal and external ear normal       Left Ear: Tympanic membrane, ear canal and external ear normal       Nose: Nose normal       Mouth/Throat:      Mouth: Mucous membranes are moist       Pharynx: Oropharynx is clear  No oropharyngeal exudate  Eyes:      General: No scleral icterus  Right eye: No discharge  Left eye: No discharge  Extraocular Movements: Extraocular movements intact  Conjunctiva/sclera: Conjunctivae normal       Pupils: Pupils are equal, round, and reactive to light  Neck:      Thyroid: No thyromegaly  Vascular: No JVD  Cardiovascular:      Rate and Rhythm: Normal rate and regular rhythm  Pulses: Normal pulses  Heart sounds: Normal heart sounds  No murmur heard  Pulmonary:      Effort: Pulmonary effort is normal       Breath sounds: No wheezing or rales  Chest:      Chest wall: No tenderness  Abdominal:      General: Bowel sounds are normal  There is no distension  Palpations: Abdomen is soft  There is no mass  Tenderness: There is no abdominal tenderness     Musculoskeletal:         " General: No tenderness or deformity  Normal range of motion  Cervical back: Normal range of motion  Lymphadenopathy:      Cervical: No cervical adenopathy  Skin:     General: Skin is warm and dry  Capillary Refill: Capillary refill takes less than 2 seconds  Findings: No rash  Neurological:      General: No focal deficit present  Mental Status: She is alert and oriented to person, place, and time  Mental status is at baseline  Cranial Nerves: No cranial nerve deficit  Coordination: Coordination normal       Deep Tendon Reflexes: Reflexes are normal and symmetric  Reflexes normal    Psychiatric:         Behavior: Behavior normal          Thought Content: Thought content normal          Judgment: Judgment normal            Data:     Pre-operative work-up    Laboratory Results: I have personally reviewed the pertinent laboratory results/reports     EKG: I have personally reviewed pertinent reports  Chest x-ray: I have personally reviewed pertinent reports  Previous cardiopulmonary studies within the past year:  · EKG stable normal sinus rhythm       Assessment:     1  Visit for screening mammogram        2  Preoperative examination        3  Combined forms of age-related cataract of left eye        4  Uncontrolled type 2 diabetes mellitus with hyperglycemia ([address] )        5  Coronary artery disease with angina pectoris, unspecified vessel or lesion type, unspecified whether native or transplanted heart ([address] )        6  Essential hypertension        7  Anxiety and depression        8  Mixed hyperlipidemia        9  Type 2 diabetes mellitus with stable proliferative retinopathy of both eyes, with long-term current use of insulin ([address] )        10  Hypotension due to hypovolemia        11  Renal insufficiency        12  Obesity, morbid ([address] )        13   Depression, recurrent ([address] )             Plan:     74 y o  female with planned surgery: Left eye cataract surgery    Cardiac Risk Estimation: per the Revised Cardiac Risk Index (Circ  100:1043, 1999), the patient's risk factors for cardiac complications include hypertension diabetes coronary disease mixed hyperlipidemia obesity, putting her in: RCI RISK CLASS III (2 risk factors, risk of major cardiac compl  appr  3 6%)  1  Further preoperative workup as follows:   - None; no further preoperative work-up is required    2  Medication Management/Recommendations:   - None, continue medication regimen including morning of surgery, with sip of water    3  Prophylaxis for cardiac events with perioperative beta-blockers: not indicated  4  Patient requires further consultation with: None    Clearance  Patient is CLEARED for surgery without any additional cardiac testing       DO SHAHANA Jennings [facility] [address]  [address] [facility] [address]  Phone#  [phone]  Fax#  [phone]

## 2023-06-23 DIAGNOSIS — E78.2 MIXED HYPERLIPIDEMIA: ICD-10-CM

## 2023-06-23 RX ORDER — ATORVASTATIN CALCIUM 40 MG/1
TABLET, FILM COATED ORAL
Qty: 90 TABLET | Refills: 0 | Status: SHIPPED | OUTPATIENT
Start: 2023-06-23

## 2023-07-19 ENCOUNTER — OFFICE VISIT (OUTPATIENT)
Dept: FAMILY MEDICINE CLINIC | Facility: CLINIC | Age: 73
End: 2023-07-19
Payer: COMMERCIAL

## 2023-07-19 VITALS
HEIGHT: 65 IN | HEART RATE: 73 BPM | BODY MASS INDEX: 34.52 KG/M2 | DIASTOLIC BLOOD PRESSURE: 76 MMHG | RESPIRATION RATE: 20 BRPM | WEIGHT: 207.2 LBS | OXYGEN SATURATION: 99 % | SYSTOLIC BLOOD PRESSURE: 130 MMHG | TEMPERATURE: 97.8 F

## 2023-07-19 DIAGNOSIS — I25.119 CORONARY ARTERY DISEASE WITH ANGINA PECTORIS, UNSPECIFIED VESSEL OR LESION TYPE, UNSPECIFIED WHETHER NATIVE OR TRANSPLANTED HEART (HCC): ICD-10-CM

## 2023-07-19 DIAGNOSIS — E11.65 UNCONTROLLED TYPE 2 DIABETES MELLITUS WITH HYPERGLYCEMIA (HCC): Primary | ICD-10-CM

## 2023-07-19 DIAGNOSIS — E78.2 MIXED HYPERLIPIDEMIA: ICD-10-CM

## 2023-07-19 DIAGNOSIS — I10 ESSENTIAL HYPERTENSION: ICD-10-CM

## 2023-07-19 PROCEDURE — 99214 OFFICE O/P EST MOD 30 MIN: CPT | Performed by: FAMILY MEDICINE

## 2023-07-19 NOTE — ASSESSMENT & PLAN NOTE
Coronary disease is stable no chest pain or anginal symptoms maintain stable blood pressure control and continue with atorvastatin

## 2023-07-19 NOTE — ASSESSMENT & PLAN NOTE
Diabetes poor control recheck A1c here today continue with insulin and adjust regimen work on diet and weight control patient has been losing weight.   Lab Results   Component Value Date    HGBA1C 9.8 (H) 02/24/2023

## 2023-07-19 NOTE — PROGRESS NOTES
Assessment/Plan:       Problem List Items Addressed This Visit        Endocrine    Uncontrolled type 2 diabetes mellitus with hyperglycemia ([address]) - Primary     Diabetes poor control recheck A1c here today continue with insulin and adjust regimen work on diet and weight control patient has been losing weight.   Lab Results   Component Value Date    HGBA1C 9.8 (H) 02/24/2023            Relevant Orders    POCT hemoglobin A1c    Albumin / creatinine urine ratio    Comprehensive metabolic panel    Hemoglobin A1C    Lipid Panel with Direct LDL reflex    TSH, 3rd generation with Free T4 reflex    Comprehensive metabolic panel    Lipid panel    Hemoglobin A1C    TSH, 3rd generation with Free T4 reflex       Cardiovascular and Mediastinum    Essential hypertension     Hypertension stable control continue with amlodipine and lisinopril hydrochlorothiazide         Relevant Orders    Albumin / creatinine urine ratio    Comprehensive metabolic panel    Hemoglobin A1C    Lipid Panel with Direct LDL reflex    TSH, 3rd generation with Free T4 reflex    Comprehensive metabolic panel    Lipid panel    Hemoglobin A1C    TSH, 3rd generation with Free T4 reflex    Coronary artery disease with angina pectoris (HCC)     Coronary disease is stable no chest pain or anginal symptoms maintain stable blood pressure control and continue with atorvastatin         Relevant Orders    Albumin / creatinine urine ratio    Comprehensive metabolic panel    Hemoglobin A1C    Lipid Panel with Direct LDL reflex    TSH, 3rd generation with Free T4 reflex    Comprehensive metabolic panel    Lipid panel    Hemoglobin A1C    TSH, 3rd generation with Free T4 reflex       Other    Mixed hyperlipidemia     Mixed hyperlipidemia stable continue Lipitor reviewed laboratory work follow heart healthy diet         Relevant Orders    Albumin / creatinine urine ratio    Comprehensive metabolic panel    Hemoglobin A1C    Lipid Panel with Direct LDL reflex    TSH, 3rd generation with Free T4 reflex    Comprehensive metabolic panel    Lipid panel    Hemoglobin A1C    TSH, 3rd generation with Free T4 reflex         Subjective:      Patient ID: Nasima Lenz is a 74 y.o. female. Follow-up evaluation and review after cataract surgery lab work done blood pressure recheck and A1c      The following portions of the patient's history were reviewed and updated as appropriate: allergies, current medications, past family history, past medical history, past social history, past surgical history and problem list.    Review of Systems   Constitutional: Negative for chills, fatigue and fever. HENT: Negative for congestion, nosebleeds, rhinorrhea, sinus pressure and sore throat. Eyes: Negative for discharge and redness. Respiratory: Negative for cough and shortness of breath. Cardiovascular: Negative for chest pain, palpitations and leg swelling. Gastrointestinal: Negative for abdominal pain, blood in stool and nausea. Endocrine: Negative for cold intolerance, heat intolerance and polyuria. Genitourinary: Negative for dysuria and frequency. Musculoskeletal: Negative for arthralgias, back pain and myalgias. Skin: Negative for rash. Neurological: Negative for dizziness, weakness and headaches. Hematological: Negative for adenopathy. Psychiatric/Behavioral: Negative for behavioral problems and sleep disturbance. The patient is not nervous/anxious. Objective:      /76 (BP Location: Left arm, Patient Position: Sitting, Cuff Size: Standard)   Pulse 73   Temp 97.8 °F (36.6 °C) (Tympanic)   Resp 20   Ht 5' 5" (1.651 m)   Wt 94 kg (207 lb 3.2 oz)   SpO2 99%   BMI 34.48 kg/m²        Physical Exam  Vitals and nursing note reviewed. Constitutional:       General: She is not in acute distress. Appearance: Normal appearance. She is well-developed. HENT:      Head: Normocephalic and atraumatic.       Right Ear: Tympanic membrane, ear canal and external ear normal.      Left Ear: Tympanic membrane, ear canal and external ear normal.      Nose: Nose normal.      Mouth/Throat:      Mouth: Mucous membranes are moist.      Pharynx: Oropharynx is clear. No oropharyngeal exudate. Eyes:      General: No scleral icterus. Right eye: No discharge. Left eye: No discharge. Extraocular Movements: Extraocular movements intact. Conjunctiva/sclera: Conjunctivae normal.      Pupils: Pupils are equal, round, and reactive to light. Neck:      Thyroid: No thyromegaly. Vascular: No JVD. Cardiovascular:      Rate and Rhythm: Normal rate and regular rhythm. Pulses: Normal pulses. Heart sounds: Normal heart sounds. No murmur heard. Pulmonary:      Effort: Pulmonary effort is normal.      Breath sounds: No wheezing or rales. Chest:      Chest wall: No tenderness. Abdominal:      General: Bowel sounds are normal. There is no distension. Palpations: Abdomen is soft. There is no mass. Tenderness: There is no abdominal tenderness. Musculoskeletal:         General: No tenderness or deformity. Normal range of motion. Cervical back: Normal range of motion. Lymphadenopathy:      Cervical: No cervical adenopathy. Skin:     General: Skin is warm and dry. Capillary Refill: Capillary refill takes less than 2 seconds. Findings: No rash. Neurological:      General: No focal deficit present. Mental Status: She is alert and oriented to person, place, and time. Mental status is at baseline. Cranial Nerves: No cranial nerve deficit. Coordination: Coordination normal.      Deep Tendon Reflexes: Reflexes are normal and symmetric. Reflexes normal.   Psychiatric:         Mood and Affect: Mood normal.         Behavior: Behavior normal.         Thought Content:  Thought content normal.         Judgment: Judgment normal.          Data:    Laboratory Results: I have personally reviewed the pertinent laboratory results/reports   Radiology/Other Diagnostic Testing Results: I have personally reviewed pertinent reports.        Lab Results   Component Value Date    WBC 4.93 02/24/2023    HGB 12.4 02/24/2023    HCT 40.1 02/24/2023    MCV 87 02/24/2023     02/24/2023     Lab Results   Component Value Date     04/09/2018    K 4.2 02/24/2023     02/24/2023    CO2 25 02/24/2023    ANIONGAP 10.1 04/09/2018    BUN 20 02/24/2023    CREATININE 0.92 02/24/2023    GLUF 197 (H) 02/24/2023    CALCIUM 9.7 02/24/2023    AST 15 02/24/2023    ALT 23 02/24/2023    ALKPHOS 53 02/24/2023    PROT 6.6 04/09/2018    BILITOT 0.9 04/09/2018    EGFR 61 02/24/2023     Lab Results   Component Value Date    CHOLESTEROL 100 02/24/2023    CHOLESTEROL 103 09/15/2021    CHOLESTEROL 81 12/22/2020     Lab Results   Component Value Date    HDL 22 (L) 02/24/2023    HDL 23 (L) 09/15/2021    HDL 13 (L) 12/22/2020     Lab Results   Component Value Date    LDLCALC 49 02/24/2023    LDLCALC 58 09/15/2021    LDLCALC 32 12/22/2020     Lab Results   Component Value Date    TRIG 145 02/24/2023    TRIG 109 09/15/2021    TRIG 180 (H) 12/22/2020     No results found for: "[place], [place]"  Lab Results   Component Value Date    OLN3MKNAIEXI 3.720 02/24/2023     Lab Results   Component Value Date    HGBA1C 9.8 (H) 02/24/2023     No results found for: "PSA"    [place], DO

## 2023-08-02 DIAGNOSIS — E78.2 MIXED HYPERLIPIDEMIA: ICD-10-CM

## 2023-08-02 RX ORDER — ATORVASTATIN CALCIUM 40 MG/1
40 TABLET, FILM COATED ORAL DAILY
Qty: 90 TABLET | Refills: 0 | Status: SHIPPED | OUTPATIENT
Start: 2023-08-02

## 2023-08-07 ENCOUNTER — APPOINTMENT (OUTPATIENT)
Dept: LAB | Facility: CLINIC | Age: 73
End: 2023-08-07
Payer: COMMERCIAL

## 2023-08-07 DIAGNOSIS — E78.2 MIXED HYPERLIPIDEMIA: ICD-10-CM

## 2023-08-07 DIAGNOSIS — I25.119 CORONARY ARTERY DISEASE WITH ANGINA PECTORIS, UNSPECIFIED VESSEL OR LESION TYPE, UNSPECIFIED WHETHER NATIVE OR TRANSPLANTED HEART (HCC): ICD-10-CM

## 2023-08-07 DIAGNOSIS — I10 ESSENTIAL HYPERTENSION: ICD-10-CM

## 2023-08-07 DIAGNOSIS — E11.65 UNCONTROLLED TYPE 2 DIABETES MELLITUS WITH HYPERGLYCEMIA (HCC): ICD-10-CM

## 2023-08-07 LAB
ALBUMIN SERPL BCP-MCNC: 3.9 G/DL (ref 3.5–5)
ALP SERPL-CCNC: 50 U/L (ref 46–116)
ALT SERPL W P-5'-P-CCNC: 20 U/L (ref 12–78)
ANION GAP SERPL CALCULATED.3IONS-SCNC: 6 MMOL/L
AST SERPL W P-5'-P-CCNC: 13 U/L (ref 5–45)
BILIRUB SERPL-MCNC: 1.04 MG/DL (ref 0.2–1)
BUN SERPL-MCNC: 21 MG/DL (ref 5–25)
CALCIUM SERPL-MCNC: 9.5 MG/DL (ref 8.3–10.1)
CHLORIDE SERPL-SCNC: 108 MMOL/L (ref 96–108)
CHOLEST SERPL-MCNC: 108 MG/DL
CO2 SERPL-SCNC: 26 MMOL/L (ref 21–32)
CREAT SERPL-MCNC: 1.05 MG/DL (ref 0.6–1.3)
EST. AVERAGE GLUCOSE BLD GHB EST-MCNC: 214 MG/DL
GFR SERPL CREATININE-BSD FRML MDRD: 52 ML/MIN/1.73SQ M
GLUCOSE P FAST SERPL-MCNC: 155 MG/DL (ref 65–99)
HBA1C MFR BLD: 9.1 %
HDLC SERPL-MCNC: 22 MG/DL
LDLC SERPL CALC-MCNC: 56 MG/DL (ref 0–100)
NONHDLC SERPL-MCNC: 86 MG/DL
POTASSIUM SERPL-SCNC: 4.5 MMOL/L (ref 3.5–5.3)
PROT SERPL-MCNC: 7.2 G/DL (ref 6.4–8.4)
SODIUM SERPL-SCNC: 140 MMOL/L (ref 135–147)
TRIGL SERPL-MCNC: 152 MG/DL

## 2023-08-07 PROCEDURE — 82570 ASSAY OF URINE CREATININE: CPT

## 2023-08-07 PROCEDURE — 82043 UR ALBUMIN QUANTITATIVE: CPT

## 2023-08-08 LAB — TSH SERPL DL<=0.05 MIU/L-ACNC: 2.43 UIU/ML (ref 0.45–4.5)

## 2023-08-23 ENCOUNTER — VBI (OUTPATIENT)
Dept: ADMINISTRATIVE | Facility: OTHER | Age: 73
End: 2023-08-23

## 2023-08-28 NOTE — PROGRESS NOTES
23  Festus Parsons Early : 1962 Sex: female  Age: 59 y.o. Chief Complaint   Patient presents with    Discuss Labs     6 month check up, review labs     Ear Fullness     Ear feels like there is fluid in right ear        Patient is here today for a 6-month visit and review of labs. She does state that she is currently experiencing some sneezing and ear fullness and sinus tenderness. No fever, chills, sweats. Denies sore throat, chest congestion, shortness of breath. No nausea, vomiting, diarrhea. Review of Systems   Constitutional:  Negative for activity change, appetite change, chills, diaphoresis, fatigue, fever and unexpected weight change. HENT:  Positive for ear pain, sinus pressure and sneezing. Negative for congestion, dental problem, drooling, ear discharge, facial swelling, hearing loss, mouth sores, nosebleeds, postnasal drip, rhinorrhea, sinus pain, sore throat, tinnitus, trouble swallowing and voice change. Eyes:  Negative for photophobia, pain, discharge, redness, itching and visual disturbance. Respiratory:  Negative for apnea, cough, choking, chest tightness, shortness of breath, wheezing and stridor. Cardiovascular:  Negative for chest pain, palpitations and leg swelling. Gastrointestinal:  Negative for abdominal distention, abdominal pain, anal bleeding, blood in stool, constipation, diarrhea, nausea, rectal pain and vomiting. Endocrine: Negative for cold intolerance, heat intolerance, polydipsia, polyphagia and polyuria. Genitourinary:  Negative for decreased urine volume, difficulty urinating, dysuria, enuresis, flank pain, frequency, genital sores, hematuria and urgency. Musculoskeletal:  Negative for arthralgias, back pain, gait problem, joint swelling, myalgias, neck pain and neck stiffness. Skin:  Negative for color change, pallor, rash and wound. Allergic/Immunologic: Negative for environmental allergies, food allergies and immunocompromised state. Diabetic Foot Exam    Patient's shoes and socks were not removed  Right Foot/Ankle   Right Foot Inspection  Skin Exam: skin normal and skin intact no dry skin, no warmth, no callus, no erythema, no maceration, no abnormal color, no pre-ulcer, no ulcer and no callus                          Toe Exam: ROM and strength within normal limits  Sensory   Vibration: intact  Proprioception: intact   Monofilament testing: intact      Left Foot/Ankle  Left Foot Inspection  Skin Exam: skin normal and skin intactno dry skin, no warmth, no erythema, no maceration, normal color, no pre-ulcer, no ulcer and no callus                         Toe Exam: ROM and strength within normal limits                   Sensory   Vibration: intact  Proprioception: intact  Monofilament: intact    Assign Risk Category:  No deformity present;  No loss of protective sensation;        Risk: 0

## 2023-09-06 DIAGNOSIS — F32.A ANXIETY AND DEPRESSION: ICD-10-CM

## 2023-09-06 DIAGNOSIS — I10 ESSENTIAL HYPERTENSION: ICD-10-CM

## 2023-09-06 DIAGNOSIS — E11.65 UNCONTROLLED TYPE 2 DIABETES MELLITUS WITH HYPERGLYCEMIA (HCC): ICD-10-CM

## 2023-09-06 DIAGNOSIS — E78.2 MIXED HYPERLIPIDEMIA: ICD-10-CM

## 2023-09-06 DIAGNOSIS — F41.9 ANXIETY AND DEPRESSION: ICD-10-CM

## 2023-09-06 DIAGNOSIS — Z12.31 VISIT FOR SCREENING MAMMOGRAM: ICD-10-CM

## 2023-09-06 DIAGNOSIS — I25.119 CORONARY ARTERY DISEASE WITH ANGINA PECTORIS, UNSPECIFIED VESSEL OR LESION TYPE, UNSPECIFIED WHETHER NATIVE OR TRANSPLANTED HEART (HCC): ICD-10-CM

## 2023-09-06 RX ORDER — HUMAN INSULIN 100 [IU]/ML
75 INJECTION, SUSPENSION SUBCUTANEOUS
Qty: 100 ML | Refills: 0 | Status: SHIPPED | OUTPATIENT
Start: 2023-09-06

## 2023-09-06 RX ORDER — LISINOPRIL AND HYDROCHLOROTHIAZIDE 20; 12.5 MG/1; MG/1
1 TABLET ORAL DAILY
Qty: 90 TABLET | Refills: 0 | Status: SHIPPED | OUTPATIENT
Start: 2023-09-06

## 2023-09-25 DIAGNOSIS — E11.65 UNCONTROLLED TYPE 2 DIABETES MELLITUS WITH HYPERGLYCEMIA (HCC): ICD-10-CM

## 2023-09-25 DIAGNOSIS — F32.A ANXIETY AND DEPRESSION: ICD-10-CM

## 2023-09-25 DIAGNOSIS — E78.2 MIXED HYPERLIPIDEMIA: ICD-10-CM

## 2023-09-25 DIAGNOSIS — I10 ESSENTIAL HYPERTENSION: ICD-10-CM

## 2023-09-25 DIAGNOSIS — Z12.31 VISIT FOR SCREENING MAMMOGRAM: ICD-10-CM

## 2023-09-25 DIAGNOSIS — I25.119 CORONARY ARTERY DISEASE WITH ANGINA PECTORIS, UNSPECIFIED VESSEL OR LESION TYPE, UNSPECIFIED WHETHER NATIVE OR TRANSPLANTED HEART (HCC): ICD-10-CM

## 2023-09-25 DIAGNOSIS — F41.9 ANXIETY AND DEPRESSION: ICD-10-CM

## 2023-09-25 RX ORDER — LISINOPRIL AND HYDROCHLOROTHIAZIDE 20; 12.5 MG/1; MG/1
1 TABLET ORAL DAILY
Qty: 90 TABLET | Refills: 0 | Status: SHIPPED | OUTPATIENT
Start: 2023-09-25

## 2023-09-25 RX ORDER — AMLODIPINE BESYLATE 5 MG/1
5 TABLET ORAL DAILY
Qty: 90 TABLET | Refills: 0 | Status: SHIPPED | OUTPATIENT
Start: 2023-09-25

## 2023-10-16 ENCOUNTER — RA CDI HCC (OUTPATIENT)
Dept: OTHER | Facility: HOSPITAL | Age: 73
End: 2023-10-16

## 2023-10-16 NOTE — PROGRESS NOTES
[address] coding opportunities       Chart reviewed, no opportunity found: [address] Review     Patients Insurance     Medicare Insurance: [employer]

## 2023-10-23 ENCOUNTER — RA CDI HCC (OUTPATIENT)
Dept: OTHER | Facility: HOSPITAL | Age: 73
End: 2023-10-23

## 2023-10-24 NOTE — PROGRESS NOTES
[address] [facility] coding opportunities       Chart reviewed, no opportunity found: CHART REVIEWED, [address]     Patients Insurance     Medicare Insurance: [employer]

## 2023-11-02 ENCOUNTER — OFFICE VISIT (OUTPATIENT)
Dept: FAMILY MEDICINE CLINIC | Facility: CLINIC | Age: 73
End: 2023-11-02
Payer: COMMERCIAL

## 2023-11-02 VITALS
OXYGEN SATURATION: 98 % | WEIGHT: 227.2 LBS | HEART RATE: 85 BPM | HEIGHT: 65 IN | BODY MASS INDEX: 37.85 KG/M2 | DIASTOLIC BLOOD PRESSURE: 72 MMHG | RESPIRATION RATE: 18 BRPM | TEMPERATURE: 98.2 F | SYSTOLIC BLOOD PRESSURE: 132 MMHG

## 2023-11-02 DIAGNOSIS — Z00.00 MEDICARE ANNUAL WELLNESS VISIT, SUBSEQUENT: ICD-10-CM

## 2023-11-02 DIAGNOSIS — E78.2 MIXED HYPERLIPIDEMIA: ICD-10-CM

## 2023-11-02 DIAGNOSIS — E11.65 UNCONTROLLED TYPE 2 DIABETES MELLITUS WITH HYPERGLYCEMIA (HCC): ICD-10-CM

## 2023-11-02 DIAGNOSIS — I25.119 CORONARY ARTERY DISEASE WITH ANGINA PECTORIS, UNSPECIFIED VESSEL OR LESION TYPE, UNSPECIFIED WHETHER NATIVE OR TRANSPLANTED HEART (HCC): ICD-10-CM

## 2023-11-02 DIAGNOSIS — Z13.820 SCREENING FOR OSTEOPOROSIS: Primary | ICD-10-CM

## 2023-11-02 DIAGNOSIS — I10 ESSENTIAL HYPERTENSION: ICD-10-CM

## 2023-11-02 DIAGNOSIS — N18.31 STAGE 3A CHRONIC KIDNEY DISEASE (HCC): ICD-10-CM

## 2023-11-02 PROCEDURE — 99214 OFFICE O/P EST MOD 30 MIN: CPT | Performed by: FAMILY MEDICINE

## 2023-11-02 PROCEDURE — G0439 PPPS, SUBSEQ VISIT: HCPCS | Performed by: FAMILY MEDICINE

## 2023-11-02 RX ORDER — ATORVASTATIN CALCIUM 40 MG/1
40 TABLET, FILM COATED ORAL DAILY
Qty: 90 TABLET | Refills: 3 | Status: SHIPPED | OUTPATIENT
Start: 2023-11-02

## 2023-11-02 RX ORDER — METFORMIN HYDROCHLORIDE 500 MG/1
500 TABLET, EXTENDED RELEASE ORAL 2 TIMES DAILY
Qty: 180 TABLET | Refills: 3 | Status: SHIPPED | OUTPATIENT
Start: 2023-11-02

## 2023-11-02 NOTE — ASSESSMENT & PLAN NOTE
Lab Results   Component Value Date    HGBA1C 9.1 (H) 08/07/2023   Recommend diet exercise avoidance of concentrated sweets increasing physical activity and weight reduction would help with lowering blood sugar she will continue with metformin and insulin 75 units 2 times daily for cost savings the 70/30 Novulin.

## 2023-11-02 NOTE — ASSESSMENT & PLAN NOTE
Lab Results   Component Value Date    EGFR 52 08/07/2023    EGFR 61 02/24/2023    EGFR 49 01/05/2023    CREATININE 1.05 08/07/2023    CREATININE 0.92 02/24/2023    CREATININE 1.10 01/05/2023   Renal function stable at last laboratory reports continuing on same medication regimen and following up with me had lab work for next visit

## 2023-11-02 NOTE — PROGRESS NOTES
Assessment and Plan:     Problem List Items Addressed This Visit          Endocrine    Uncontrolled type 2 diabetes mellitus with hyperglycemia ([address])       Lab Results   Component Value Date    HGBA1C 9.1 (H) 08/07/2023   Recommend diet exercise avoidance of concentrated sweets increasing physical activity and weight reduction would help with lowering blood sugar she will continue with metformin and insulin 75 units 2 times daily for cost savings the 70/30 Novulin.          Relevant Medications    metFORMIN (GLUCOPHAGE-XR) 500 mg 24 hr tablet    Other Relevant Orders    Albumin / creatinine urine ratio    Comprehensive metabolic panel    Hemoglobin A1C    TSH, 3rd generation with Free T4 reflex    Lipid Panel with Direct LDL reflex       Cardiovascular and Mediastinum    Essential hypertension     Stable blood pressure unchanged medication regimen of lisinopril hydrochlorothiazide         Coronary artery disease with angina pectoris (HCC)     Coronary disease stable no chest pain            Genitourinary    Stage 3a chronic kidney disease ([address])     Lab Results   Component Value Date    EGFR 52 08/07/2023    EGFR 61 02/24/2023    EGFR 49 01/05/2023    CREATININE 1.05 08/07/2023    CREATININE 0.92 02/24/2023    CREATININE 1.10 01/05/2023   Renal function stable at last laboratory reports continuing on same medication regimen and following up with me had lab work for next visit         Relevant Orders    Albumin / creatinine urine ratio    Comprehensive metabolic panel    Hemoglobin A1C    TSH, 3rd generation with Free T4 reflex    Lipid Panel with Direct LDL reflex       Other    Mixed hyperlipidemia     Refill atorvastatin at this time and recheck lab work for next visit         Relevant Medications    atorvastatin (LIPITOR) 40 mg tablet    Other Relevant Orders    Albumin / creatinine urine ratio    Comprehensive metabolic panel    Hemoglobin A1C    TSH, 3rd generation with Free T4 reflex    Lipid Panel with Direct LDL reflex    Medicare annual wellness visit, subsequent     Other Visit Diagnoses       Screening for osteoporosis    -  Primary             Preventive health issues were discussed with patient, and age appropriate screening tests were ordered as noted in patient's After Visit Summary. Personalized health advice and appropriate referrals for health education or preventive services given if needed, as noted in patient's After Visit Summary. History of Present Illness:     Patient presents for a Medicare Wellness Visit    Follow-up evaluation today Medicare wellness visit she is doing well overall. Occasional low blood sugars at times here at the office this morning she notes she is feeling lightheaded and dizzy and her blood sugar was in the 50s this morning. At this point in the office Accu-Chek shows a reading of 60       Patient Care Team:  Auburn Lundborg, DO as PCP - General (Family Medicine)     Review of Systems:     Review of Systems   Constitutional:  Negative for chills and fever. HENT:  Negative for ear pain and sore throat. Eyes:  Negative for pain and visual disturbance. Respiratory:  Negative for cough and shortness of breath. Cardiovascular:  Negative for chest pain and palpitations. Gastrointestinal:  Negative for abdominal pain and vomiting. Genitourinary:  Negative for dysuria and hematuria. Musculoskeletal:  Positive for arthralgias. Negative for back pain. Skin:  Negative for color change and rash. Neurological:  Negative for seizures and syncope. All other systems reviewed and are negative.        Problem List:     Patient Active Problem List   Diagnosis    Mixed hyperlipidemia    Essential hypertension    Uncontrolled type 2 diabetes mellitus with hyperglycemia ([address])    Breast cancer screening    Anxiety and depression    Perianal abscess    Coronary artery disease with angina pectoris (HCC)    Paronychia of great toe of left foot    Diabetic ketoacidosis without coma associated with type 2 diabetes mellitus ([address])    Fracture of right humerus with routine healing    History of fall    Medicare annual wellness visit, subsequent    Syncope and collapse    COVID-19 virus infection    Renal insufficiency    Hypotension, unspecified    Left against medical advice    Depression, recurrent ([address])    Obesity, morbid ([address])    Type 2 diabetes mellitus with stable proliferative retinopathy of both eyes, with long-term current use of insulin ([address])    Poison ivy    Functional urinary incontinence    Preoperative examination    Combined forms of age-related cataract of left eye    Stage 3a chronic kidney disease ([address])      Past Medical and Surgical History:     Past Medical History:   Diagnosis Date    Coronary artery disease     Diabetes mellitus ([address])     Hypertension      Past Surgical History:   Procedure Laterality Date    APPENDECTOMY      CHOLECYSTECTOMY      EYE SURGERY Left     LEG SURGERY Right     vein    TONSILLECTOMY      VEIN SURGERY      WRIST SURGERY Right     ganglion cyst      Family History:     Family History   Problem Relation Age of Onset    Cancer Mother     Diabetes Father     Hypertension Father     Heart attack Father       Social History:     Social History     Socioeconomic History    Marital status:       Spouse name: None    Number of children: None    Years of education: None    Highest education level: None   Occupational History    None   Tobacco Use    Smoking status: Former     Packs/day: 1.00     Years: 3.00     Total pack years: 3.00     Types: Cigarettes     Start date: 0     Quit date: 65     Years since quittin.8    Smokeless tobacco: Never   Vaping Use    Vaping Use: Never used   Substance and Sexual Activity    Alcohol use: Not Currently    Drug use: Never    Sexual activity: None   Other Topics Concern    None   Social History Narrative    None     Social Determinants of Health     Financial Resource Strain: Low Risk  (2023) Overall Financial Resource Strain (CARDIA)     Difficulty of Paying Living Expenses: Not very hard   Food Insecurity: Not on file   Transportation Needs: No Transportation Needs (11/2/2023)    PRAPARE - Transportation     Lack of Transportation (Medical): No     Lack of Transportation (Non-Medical): No   Physical Activity: Not on file   Stress: Not on file   Social Connections: Not on file   Intimate Partner Violence: Not on file   Housing Stability: Not on file      Medications and Allergies:     Current Outpatient Medications   Medication Sig Dispense Refill    amLODIPine (NORVASC) 5 mg tablet Take 1 tablet (5 mg total) by mouth daily 90 tablet 0    atorvastatin (LIPITOR) 40 mg tablet Take 1 tablet (40 mg total) by mouth daily 90 tablet 3    insulin NPH-insulin regular (NovoLIN 70/30 ReliOn) 100 units/mL subcutaneous injection Inject 75 Units under the skin 2 (two) times a day before meals 100 mL 0    Insulin Pen Needle 31G X 6 MM MISC by Does not apply route      lisinopril-hydrochlorothiazide (PRINZIDE,ZESTORETIC) 20-12.5 MG per tablet Take 1 tablet by mouth daily 90 tablet 0    metFORMIN (GLUCOPHAGE-XR) 500 mg 24 hr tablet Take 1 tablet (500 mg total) by mouth 2 (two) times a day 180 tablet 3    meloxicam (Mobic) 15 mg tablet Take 1 tablet (15 mg total) by mouth daily for 7 days (Patient not taking: Reported on 11/2/2023) 7 tablet 0    Mirabegron ER 25 MG TB24 Take 25 mg by mouth in the morning (Patient not taking: Reported on 6/22/2023) 30 tablet 5    ReliOn Ultra Thin Lancets 30G MISC USE 1 THREE TIMES DAILY (Patient not taking: Reported on 7/19/2023)      tiZANidine (ZANAFLEX) 2 mg tablet Take 1 tablet (2 mg total) by mouth 2 (two) times a day as needed for muscle spasms for up to 10 doses (Patient not taking: Reported on 7/19/2023) 10 tablet 0     No current facility-administered medications for this visit.      Allergies   Allergen Reactions    Ciprofloxacin     Other      Nitro TD Patch    Simvastatin Hives      Immunizations:     Immunization History   Administered Date(s) Administered    COVID-19 PFIZER VACCINE 0.3 ML IM 03/16/2021, 04/06/2021      Health Maintenance:         Topic Date Due    Colorectal Cancer Screening  07/19/2024 (Originally 9/2/1995)    Breast Cancer Screening: Mammogram  07/19/2024 (Originally 10/19/2016)    Hepatitis C Screening  Completed         Topic Date Due    Pneumococcal Vaccine: 65+ Years (1 - PCV) Never done    COVID-19 Vaccine (3 - Pfizer series) 06/01/2021    Influenza Vaccine (1) Never done      Medicare Screening Tests and Risk Assessments:     Bonnie Hearn is here for her Subsequent Wellness visit. Health Risk Assessment:   Patient rates overall health as good. Patient feels that their physical health rating is same. Patient is satisfied with their life. Hearing was rated as same. Patient feels that their emotional and mental health rating is same. Patients states they are sometimes angry. Patient states they are often unusually tired/fatigued. Pain experienced in the last 7 days has been none. Patient states that she has experienced no weight loss or gain in last 6 months. Depression Screening:   PHQ-9 Score: 0      Fall Risk Screening: In the past year, patient has experienced: no history of falling in past year      Urinary Incontinence Screening:   Patient has leaked urine accidently in the last six months. Home Safety:  Patient has trouble with stairs inside or outside of their home. Patient has working smoke alarms and has working carbon monoxide detector. Home safety hazards include: none. Nutrition:   Current diet is Diabetic. Medications:   Patient is currently taking over-the-counter supplements. OTC medications include: see medication list. Patient is able to manage medications.      Activities of Daily Living (ADLs)/Instrumental Activities of Daily Living (IADLs):   Walk and transfer into and out of bed and chair?: Yes  Dress and groom yourself?: Yes    Bathe or shower yourself?: Yes    Feed yourself? Yes  Do your laundry/housekeeping?: Yes  Manage your money, pay your bills and track your expenses?: Yes  Make your own meals?: Yes    Do your own shopping?: Yes    Previous Hospitalizations:   Any hospitalizations or ED visits within the last 12 months?: No      PREVENTIVE SCREENINGS      Cardiovascular Screening:    General: Screening Not Indicated and History Lipid Disorder      Diabetes Screening:     General: Screening Not Indicated and History Diabetes      Cervical Cancer Screening:    General: Screening Not Indicated      Lung Cancer Screening:     General: Screening Not Indicated      Hepatitis C Screening:    General: Screening Current    Screening, Brief Intervention, and Referral to Treatment (SBIRT)    Screening  Typical number of drinks in a day: 0  Typical number of drinks in a week: 0  Interpretation: Low risk drinking behavior. Single Item Drug Screening:  How often have you used an illegal drug (including marijuana) or a prescription medication for non-medical reasons in the past year? never    Single Item Drug Screen Score: 0  Interpretation: Negative screen for possible drug use disorder    No results found. Physical Exam:     /72 (BP Location: Right arm, Patient Position: Sitting, Cuff Size: Large)   Pulse 85   Temp 98.2 °F (36.8 °C) (Tympanic)   Resp 18   Ht 5' 5" (1.651 m)   Wt 103 kg (227 lb 3.2 oz)   SpO2 98%   BMI 37.81 kg/m²     Physical Exam  Vitals and nursing note reviewed. Constitutional:       General: She is not in acute distress. Appearance: Normal appearance. She is well-developed. She is obese. HENT:      Head: Normocephalic and atraumatic. Right Ear: Tympanic membrane normal.      Left Ear: Tympanic membrane normal.      Nose: Nose normal.      Mouth/Throat:      Mouth: Mucous membranes are moist.   Eyes:      Extraocular Movements: Extraocular movements intact.       Conjunctiva/sclera: Conjunctivae normal.   Cardiovascular:      Rate and Rhythm: Normal rate and regular rhythm. Pulses: Normal pulses. no weak pulses          Dorsalis pedis pulses are 2+ on the right side and 2+ on the left side. Posterior tibial pulses are 2+ on the right side and 2+ on the left side. Heart sounds: Normal heart sounds. No murmur heard. Pulmonary:      Effort: Pulmonary effort is normal. No respiratory distress. Breath sounds: Normal breath sounds. Abdominal:      Palpations: Abdomen is soft. Tenderness: There is no abdominal tenderness. Musculoskeletal:         General: No swelling. Normal range of motion. Cervical back: Neck supple. Feet:      Right foot:      Skin integrity: No ulcer, skin breakdown, erythema, warmth, callus or dry skin. Left foot:      Skin integrity: No ulcer, skin breakdown, erythema, warmth, callus or dry skin. Skin:     General: Skin is warm and dry. Capillary Refill: Capillary refill takes less than 2 seconds. Neurological:      General: No focal deficit present. Mental Status: She is alert. Psychiatric:         Mood and Affect: Mood normal.         Behavior: Behavior normal.         Thought Content: Thought content normal.          [facility]: Body mass index is 37.81 kg/m². The BMI is above normal. Nutrition recommendations include reducing portion sizes, decreasing overall calorie intake, 3-5 servings of fruits/vegetables daily, reducing fast food intake, consuming healthier snacks, moderation in carbohydrate intake, and increasing intake of lean protein. Diabetic Foot Exam    Patient's shoes and socks removed. Right Foot/Ankle   Right Foot Inspection  Skin Exam: skin normal and skin intact. No dry skin, no warmth, no callus, no erythema, no maceration, no abnormal color, no pre-ulcer, no ulcer and no callus. Toe Exam: ROM and strength within normal limits.      Sensory   Monofilament testing: intact    Vascular  Capillary refills: < 3 seconds  The right DP pulse is 2+. The right PT pulse is 2+. Left Foot/Ankle  Left Foot Inspection  Skin Exam: skin normal and skin intact. No dry skin, no warmth, no erythema, no maceration, normal color, no pre-ulcer, no ulcer and no callus. Toe Exam: ROM and strength within normal limits. Sensory   Monofilament testing: intact    Vascular  Capillary refills: < 3 seconds  The left DP pulse is 2+. The left PT pulse is 2+.      Assign Risk Category  No deformity present  No loss of protective sensation  No weak pulses  Risk: 0

## 2023-11-03 ENCOUNTER — VBI (OUTPATIENT)
Dept: ADMINISTRATIVE | Facility: OTHER | Age: 73
End: 2023-11-03

## 2023-11-27 DIAGNOSIS — E11.65 UNCONTROLLED TYPE 2 DIABETES MELLITUS WITH HYPERGLYCEMIA (HCC): ICD-10-CM

## 2023-11-27 RX ORDER — HUMAN INSULIN 100 [IU]/ML
75 INJECTION, SUSPENSION SUBCUTANEOUS
Qty: 100 ML | Refills: 1 | Status: SHIPPED | OUTPATIENT
Start: 2023-11-27

## 2023-12-27 DIAGNOSIS — Z12.31 VISIT FOR SCREENING MAMMOGRAM: ICD-10-CM

## 2023-12-27 DIAGNOSIS — E78.2 MIXED HYPERLIPIDEMIA: ICD-10-CM

## 2023-12-27 DIAGNOSIS — E11.65 UNCONTROLLED TYPE 2 DIABETES MELLITUS WITH HYPERGLYCEMIA (HCC): ICD-10-CM

## 2023-12-27 DIAGNOSIS — F41.9 ANXIETY AND DEPRESSION: ICD-10-CM

## 2023-12-27 DIAGNOSIS — I25.119 CORONARY ARTERY DISEASE WITH ANGINA PECTORIS, UNSPECIFIED VESSEL OR LESION TYPE, UNSPECIFIED WHETHER NATIVE OR TRANSPLANTED HEART (HCC): ICD-10-CM

## 2023-12-27 DIAGNOSIS — F32.A ANXIETY AND DEPRESSION: ICD-10-CM

## 2023-12-27 DIAGNOSIS — I10 ESSENTIAL HYPERTENSION: ICD-10-CM

## 2023-12-27 RX ORDER — AMLODIPINE BESYLATE 5 MG/1
5 TABLET ORAL DAILY
Qty: 90 TABLET | Refills: 0 | Status: SHIPPED | OUTPATIENT
Start: 2023-12-27

## 2023-12-27 RX ORDER — LISINOPRIL AND HYDROCHLOROTHIAZIDE 20; 12.5 MG/1; MG/1
1 TABLET ORAL DAILY
Qty: 90 TABLET | Refills: 0 | Status: SHIPPED | OUTPATIENT
Start: 2023-12-27

## 2024-01-05 ENCOUNTER — VBI (OUTPATIENT)
Dept: ADMINISTRATIVE | Facility: OTHER | Age: 74
End: 2024-01-05

## 2024-02-21 PROBLEM — Z12.39 BREAST CANCER SCREENING: Status: RESOLVED | Noted: 2019-06-04 | Resolved: 2024-02-21

## 2024-02-21 PROBLEM — Z00.00 MEDICARE ANNUAL WELLNESS VISIT, SUBSEQUENT: Status: RESOLVED | Noted: 2020-07-14 | Resolved: 2024-02-21

## 2024-02-21 PROBLEM — E11.10 DIABETIC KETOACIDOSIS WITHOUT COMA ASSOCIATED WITH TYPE 2 DIABETES MELLITUS (HCC): Status: RESOLVED | Noted: 2020-06-18 | Resolved: 2024-02-21

## 2024-03-01 LAB
LEFT EYE DIABETIC RETINOPATHY: NORMAL
RIGHT EYE DIABETIC RETINOPATHY: NORMAL
SEVERITY (EYE EXAM): NORMAL

## 2024-03-11 ENCOUNTER — RA CDI HCC (OUTPATIENT)
Dept: OTHER | Facility: HOSPITAL | Age: 74
End: 2024-03-11

## 2024-03-14 ENCOUNTER — RA CDI HCC (OUTPATIENT)
Dept: OTHER | Facility: HOSPITAL | Age: 74
End: 2024-03-14

## 2024-03-15 NOTE — PROGRESS NOTES
HCC coding opportunities     E11.22, I12.9     Chart Reviewed number of suggestions sent to Provider: 2   GR    Patients Insurance     Medicare Insurance: [employer]

## 2024-03-20 ENCOUNTER — OFFICE VISIT (OUTPATIENT)
Dept: FAMILY MEDICINE CLINIC | Facility: CLINIC | Age: 74
End: 2024-03-20
Payer: COMMERCIAL

## 2024-03-20 VITALS
RESPIRATION RATE: 18 BRPM | HEART RATE: 78 BPM | DIASTOLIC BLOOD PRESSURE: 78 MMHG | WEIGHT: 230.2 LBS | OXYGEN SATURATION: 97 % | HEIGHT: 65 IN | SYSTOLIC BLOOD PRESSURE: 130 MMHG | TEMPERATURE: 97.9 F | BODY MASS INDEX: 38.35 KG/M2

## 2024-03-20 DIAGNOSIS — Z79.4 TYPE 2 DIABETES MELLITUS WITH STABLE PROLIFERATIVE RETINOPATHY OF BOTH EYES, WITH LONG-TERM CURRENT USE OF INSULIN (HCC): ICD-10-CM

## 2024-03-20 DIAGNOSIS — E11.3553 TYPE 2 DIABETES MELLITUS WITH STABLE PROLIFERATIVE RETINOPATHY OF BOTH EYES, WITH LONG-TERM CURRENT USE OF INSULIN (HCC): ICD-10-CM

## 2024-03-20 DIAGNOSIS — Z79.4 TYPE 2 DIABETES MELLITUS WITH STAGE 3B CHRONIC KIDNEY DISEASE, WITH LONG-TERM CURRENT USE OF INSULIN (HCC): ICD-10-CM

## 2024-03-20 DIAGNOSIS — I10 ESSENTIAL HYPERTENSION: ICD-10-CM

## 2024-03-20 DIAGNOSIS — N18.31 STAGE 3A CHRONIC KIDNEY DISEASE (HCC): ICD-10-CM

## 2024-03-20 DIAGNOSIS — Z12.31 VISIT FOR SCREENING MAMMOGRAM: ICD-10-CM

## 2024-03-20 DIAGNOSIS — E11.65 UNCONTROLLED TYPE 2 DIABETES MELLITUS WITH HYPERGLYCEMIA (HCC): ICD-10-CM

## 2024-03-20 DIAGNOSIS — E66.01 OBESITY, MORBID (HCC): ICD-10-CM

## 2024-03-20 DIAGNOSIS — F33.9 DEPRESSION, RECURRENT (HCC): ICD-10-CM

## 2024-03-20 DIAGNOSIS — I25.119 CORONARY ARTERY DISEASE WITH ANGINA PECTORIS, UNSPECIFIED VESSEL OR LESION TYPE, UNSPECIFIED WHETHER NATIVE OR TRANSPLANTED HEART (HCC): Primary | ICD-10-CM

## 2024-03-20 DIAGNOSIS — F32.A ANXIETY AND DEPRESSION: ICD-10-CM

## 2024-03-20 DIAGNOSIS — E78.2 MIXED HYPERLIPIDEMIA: ICD-10-CM

## 2024-03-20 DIAGNOSIS — E11.22 TYPE 2 DIABETES MELLITUS WITH STAGE 3B CHRONIC KIDNEY DISEASE, WITH LONG-TERM CURRENT USE OF INSULIN (HCC): ICD-10-CM

## 2024-03-20 DIAGNOSIS — F41.9 ANXIETY AND DEPRESSION: ICD-10-CM

## 2024-03-20 DIAGNOSIS — N18.32 TYPE 2 DIABETES MELLITUS WITH STAGE 3B CHRONIC KIDNEY DISEASE, WITH LONG-TERM CURRENT USE OF INSULIN (HCC): ICD-10-CM

## 2024-03-20 PROBLEM — I12.9 HYPERTENSIVE KIDNEY DISEASE WITH CHRONIC KIDNEY DISEASE: Status: ACTIVE | Noted: 2019-02-25

## 2024-03-20 PROBLEM — I12.9 HYPERTENSIVE KIDNEY DISEASE WITH CHRONIC KIDNEY DISEASE: Status: ACTIVE | Noted: 2024-03-20

## 2024-03-20 PROCEDURE — 99214 OFFICE O/P EST MOD 30 MIN: CPT | Performed by: FAMILY MEDICINE

## 2024-03-20 PROCEDURE — G2211 COMPLEX E/M VISIT ADD ON: HCPCS | Performed by: FAMILY MEDICINE

## 2024-03-20 RX ORDER — LISINOPRIL AND HYDROCHLOROTHIAZIDE 20; 12.5 MG/1; MG/1
1 TABLET ORAL DAILY
Qty: 90 TABLET | Refills: 0 | Status: SHIPPED | OUTPATIENT
Start: 2024-03-20

## 2024-03-20 RX ORDER — METFORMIN HYDROCHLORIDE 500 MG/1
500 TABLET, EXTENDED RELEASE ORAL 2 TIMES DAILY
Qty: 180 TABLET | Refills: 3 | Status: SHIPPED | OUTPATIENT
Start: 2024-03-20

## 2024-03-20 RX ORDER — ATORVASTATIN CALCIUM 40 MG/1
40 TABLET, FILM COATED ORAL DAILY
Qty: 90 TABLET | Refills: 3 | Status: SHIPPED | OUTPATIENT
Start: 2024-03-20

## 2024-03-20 RX ORDER — HUMAN INSULIN 100 [IU]/ML
75 INJECTION, SUSPENSION SUBCUTANEOUS
Qty: 100 ML | Refills: 1 | Status: SHIPPED | OUTPATIENT
Start: 2024-03-20

## 2024-03-20 RX ORDER — AMLODIPINE BESYLATE 5 MG/1
5 TABLET ORAL DAILY
Qty: 90 TABLET | Refills: 0 | Status: SHIPPED | OUTPATIENT
Start: 2024-03-20

## 2024-03-20 NOTE — PROGRESS NOTES
Assessment/Plan:       Problem List Items Addressed This Visit          Cardiovascular and Mediastinum    Coronary artery disease with angina pectoris (HCC) - Primary     Stable on current medications including amlodipine and atorvastatin follow-up with me as scheduled at next office visit include lab work at that time         Relevant Medications    lisinopril-hydrochlorothiazide (PRINZIDE,ZESTORETIC) 20-12.5 MG per tablet    amLODIPine (NORVASC) 5 mg tablet       Endocrine    Uncontrolled type 2 diabetes mellitus with hyperglycemia (HCC)       Lab Results   Component Value Date    HGBA1C 9.1 (H) 08/07/2023   Continue medications follow-up A1c every 4 months         Relevant Medications    metFORMIN (GLUCOPHAGE-XR) 500 mg 24 hr tablet    lisinopril-hydrochlorothiazide (PRINZIDE,ZESTORETIC) 20-12.5 MG per tablet    insulin NPH-insulin regular (NovoLIN 70/30 ReliOn) 100 units/mL subcutaneous injection    Type 2 diabetes mellitus with stable proliferative retinopathy of both eyes, with long-term current use of insulin (HCC)     Continue medications as scheduled medications renewed at this time  Lab Results   Component Value Date    HGBA1C 9.1 (H) 08/07/2023            Relevant Medications    metFORMIN (GLUCOPHAGE-XR) 500 mg 24 hr tablet    insulin NPH-insulin regular (NovoLIN 70/30 ReliOn) 100 units/mL subcutaneous injection    Type 2 diabetes mellitus with chronic kidney disease, with long-term current use of insulin (HCC)     Patient will go over her blood work now this week and we will review her information and discuss medication changes if necessary and follow-up with me as scheduled at next visit  Lab Results   Component Value Date    HGBA1C 9.1 (H) 08/07/2023            Relevant Medications    metFORMIN (GLUCOPHAGE-XR) 500 mg 24 hr tablet    insulin NPH-insulin regular (NovoLIN 70/30 ReliOn) 100 units/mL subcutaneous injection    Other Relevant Orders    Albumin / creatinine urine ratio    Comprehensive  metabolic panel    Hemoglobin A1C    TSH, 3rd generation with Free T4 reflex    Lipid Panel with Direct LDL reflex       Genitourinary    Stage 3a chronic kidney disease (HCC)     Lab Results   Component Value Date    EGFR 52 08/07/2023    EGFR 61 02/24/2023    EGFR 49 01/05/2023    CREATININE 1.05 08/07/2023    CREATININE 0.92 02/24/2023    CREATININE 1.10 01/05/2023   Recheck GFR laboratory work CMP pending continue same medications            Behavioral Health    Anxiety and depression    Relevant Medications    lisinopril-hydrochlorothiazide (PRINZIDE,ZESTORETIC) 20-12.5 MG per tablet    Depression, recurrent (HCC)     Stable on same medication regimen no change follow-up at 4 months            Other    Mixed hyperlipidemia     Continue with atorvastatin 40 mg daily recheck lab work 4 months         Relevant Medications    lisinopril-hydrochlorothiazide (PRINZIDE,ZESTORETIC) 20-12.5 MG per tablet    atorvastatin (LIPITOR) 40 mg tablet    Obesity, morbid (HCC)     Continue same dietary plan work on increased physical activity in the summer          Other Visit Diagnoses       Essential hypertension        Relevant Medications    lisinopril-hydrochlorothiazide (PRINZIDE,ZESTORETIC) 20-12.5 MG per tablet    amLODIPine (NORVASC) 5 mg tablet    Visit for screening mammogram        Relevant Medications    lisinopril-hydrochlorothiazide (PRINZIDE,ZESTORETIC) 20-12.5 MG per tablet              Subjective:      Patient ID: Gin Haynes is a 73 y.o. female.    Re check levels lab work and discuss medications. Refuse DEXA scan.        The following portions of the patient's history were reviewed and updated as appropriate: allergies, current medications, past family history, past medical history, past social history, past surgical history and problem list.    Review of Systems   Constitutional:  Negative for chills, fatigue and fever.   HENT:  Negative for congestion, nosebleeds, rhinorrhea, sinus pressure and sore  "throat.    Eyes:  Negative for discharge and redness.   Respiratory:  Negative for cough and shortness of breath.    Cardiovascular:  Negative for chest pain, palpitations and leg swelling.   Gastrointestinal:  Negative for abdominal pain, blood in stool and nausea.   Endocrine: Negative for cold intolerance, heat intolerance and polyuria.   Genitourinary:  Negative for dysuria and frequency.   Musculoskeletal:  Negative for arthralgias, back pain and myalgias.   Skin:  Negative for rash.   Neurological:  Negative for dizziness, weakness and headaches.   Hematological:  Negative for adenopathy.   Psychiatric/Behavioral:  Negative for behavioral problems and sleep disturbance. The patient is not nervous/anxious.          Objective:      /78   Pulse 78   Temp 97.9 °F (36.6 °C) (Tympanic)   Resp 18   Ht 5' 5\" (1.651 m)   Wt 104 kg (230 lb 3.2 oz)   SpO2 97%   BMI 38.31 kg/m²        Physical Exam  Vitals and nursing note reviewed.   Constitutional:       General: She is not in acute distress.     Appearance: Normal appearance. She is well-developed. She is obese.   HENT:      Head: Normocephalic and atraumatic.      Right Ear: Tympanic membrane and external ear normal.      Left Ear: Tympanic membrane and external ear normal.      Nose: Nose normal.      Mouth/Throat:      Mouth: Mucous membranes are moist.      Pharynx: Oropharynx is clear. No oropharyngeal exudate.   Eyes:      General: No scleral icterus.        Right eye: No discharge.         Left eye: No discharge.      Conjunctiva/sclera: Conjunctivae normal.      Pupils: Pupils are equal, round, and reactive to light.   Neck:      Thyroid: No thyromegaly.      Vascular: No JVD.   Cardiovascular:      Rate and Rhythm: Normal rate and regular rhythm.      Pulses: Normal pulses.      Heart sounds: Normal heart sounds. No murmur heard.  Pulmonary:      Effort: Pulmonary effort is normal.      Breath sounds: No wheezing or rales.   Chest:      Chest " wall: No tenderness.   Abdominal:      General: Bowel sounds are normal. There is no distension.      Palpations: Abdomen is soft. There is no mass.      Tenderness: There is no abdominal tenderness.   Musculoskeletal:         General: No tenderness or deformity. Normal range of motion.      Cervical back: Normal range of motion.   Lymphadenopathy:      Cervical: No cervical adenopathy.   Skin:     General: Skin is warm and dry.      Findings: No rash.   Neurological:      General: No focal deficit present.      Mental Status: She is alert and oriented to person, place, and time. Mental status is at baseline.      Cranial Nerves: No cranial nerve deficit.      Coordination: Coordination normal.      Deep Tendon Reflexes: Reflexes are normal and symmetric. Reflexes normal.   Psychiatric:         Mood and Affect: Mood normal.         Behavior: Behavior normal.         Thought Content: Thought content normal.         Judgment: Judgment normal.          Data:    Laboratory Results: I have personally reviewed the pertinent laboratory results/reports   Radiology/Other Diagnostic Testing Results: I have personally reviewed pertinent reports.       Lab Results   Component Value Date    WBC 4.93 02/24/2023    HGB 12.4 02/24/2023    HCT 40.1 02/24/2023    MCV 87 02/24/2023     02/24/2023     Lab Results   Component Value Date     04/09/2018    K 4.5 08/07/2023     08/07/2023    CO2 26 08/07/2023    ANIONGAP 10.1 04/09/2018    BUN 21 08/07/2023    CREATININE 1.05 08/07/2023    GLUF 155 (H) 08/07/2023    CALCIUM 9.5 08/07/2023    AST 13 08/07/2023    ALT 20 08/07/2023    ALKPHOS 50 08/07/2023    PROT 6.6 04/09/2018    BILITOT 0.9 04/09/2018    EGFR 52 08/07/2023     Lab Results   Component Value Date    CHOLESTEROL 108 08/07/2023    CHOLESTEROL 100 02/24/2023    CHOLESTEROL 103 09/15/2021     Lab Results   Component Value Date    HDL 22 (L) 08/07/2023    HDL 22 (L) 02/24/2023    HDL 23 (L) 09/15/2021     Lab  "Results   Component Value Date    LDLCALC 56 08/07/2023    LDLCALC 49 02/24/2023    LDLCALC 58 09/15/2021     Lab Results   Component Value Date    TRIG 152 (H) 08/07/2023    TRIG 145 02/24/2023    TRIG 109 09/15/2021     No results found for: \"CHOLHDL\"  Lab Results   Component Value Date    MJP2WNGFIJIL 2.427 08/07/2023     Lab Results   Component Value Date    HGBA1C 9.1 (H) 08/07/2023     No results found for: \"PSA\"    Fer Márquez,       "

## 2024-03-20 NOTE — ASSESSMENT & PLAN NOTE
Patient will go over her blood work now this week and we will review her information and discuss medication changes if necessary and follow-up with me as scheduled at next visit  Lab Results   Component Value Date    HGBA1C 9.1 (H) 08/07/2023

## 2024-03-20 NOTE — ASSESSMENT & PLAN NOTE
Stable on current medications including amlodipine and atorvastatin follow-up with me as scheduled at next office visit include lab work at that time

## 2024-03-20 NOTE — ASSESSMENT & PLAN NOTE
Continue medications as scheduled medications renewed at this time  Lab Results   Component Value Date    HGBA1C 9.1 (H) 08/07/2023

## 2024-03-20 NOTE — ASSESSMENT & PLAN NOTE
Lab Results   Component Value Date    EGFR 52 08/07/2023    EGFR 61 02/24/2023    EGFR 49 01/05/2023    CREATININE 1.05 08/07/2023    CREATININE 0.92 02/24/2023    CREATININE 1.10 01/05/2023   Recheck GFR laboratory work CMP pending continue same medications

## 2024-03-20 NOTE — ASSESSMENT & PLAN NOTE
Lab Results   Component Value Date    HGBA1C 9.1 (H) 08/07/2023   Continue medications follow-up A1c every 4 months

## 2024-03-21 ENCOUNTER — TELEPHONE (OUTPATIENT)
Dept: ADMINISTRATIVE | Facility: OTHER | Age: 74
End: 2024-03-21

## 2024-03-21 NOTE — TELEPHONE ENCOUNTER
Upon review of the In Basket request we were able to locate, review, and update the patient chart as requested for Diabetic Eye Exam.    Any additional questions or concerns should be emailed to the Practice Liaisons via the appropriate education email address, please do not reply via In Basket.    Thank you  Antionette Fontanez

## 2024-03-21 NOTE — TELEPHONE ENCOUNTER
----- Message from Dory Herrera sent at 3/20/2024 12:08 PM EDT -----  Regarding: DM eye  03/20/24 12:09 PM    Chris, our patient Gin Haynes has had Diabetic eye exam completed/performed. Please assist in updating the patient chart by pulling the Care Everywhere (CE) document. The date of service is 3/1/24.  It should be under:  Reason for visit- IMP/plan-1,3 and 6    Thank you,  Dory TALLEY

## 2024-05-07 ENCOUNTER — TELEPHONE (OUTPATIENT)
Age: 74
End: 2024-05-07

## 2024-05-07 DIAGNOSIS — E11.65 UNCONTROLLED TYPE 2 DIABETES MELLITUS WITH HYPERGLYCEMIA (HCC): ICD-10-CM

## 2024-05-07 RX ORDER — HUMAN INSULIN 100 [IU]/ML
75 INJECTION, SUSPENSION SUBCUTANEOUS
Qty: 50 ML | Refills: 0 | Status: SHIPPED | OUTPATIENT
Start: 2024-05-07

## 2024-06-06 ENCOUNTER — TELEPHONE (OUTPATIENT)
Age: 74
End: 2024-06-06

## 2024-06-06 NOTE — TELEPHONE ENCOUNTER
Pt called and is wondering if the dr can put her insulin NPH to 100 instead of the 50 so she doesn't have to pay for the medication as frequently. Please advise.

## 2024-06-06 NOTE — TELEPHONE ENCOUNTER
Pt is made aware you care out of the office til 06/10  Please see message below    Pt called asking if you can change her insulin dose from 50 ml back to 100 ml  Pt stated that when she gets the 100ml it is cheaper

## 2024-06-11 DIAGNOSIS — E11.65 UNCONTROLLED TYPE 2 DIABETES MELLITUS WITH HYPERGLYCEMIA (HCC): ICD-10-CM

## 2024-06-11 RX ORDER — HUMAN INSULIN 100 [IU]/ML
75 INJECTION, SUSPENSION SUBCUTANEOUS
Qty: 100 ML | Refills: 3 | Status: SHIPPED | OUTPATIENT
Start: 2024-06-11

## 2024-06-26 DIAGNOSIS — E78.2 MIXED HYPERLIPIDEMIA: ICD-10-CM

## 2024-06-26 DIAGNOSIS — F41.9 ANXIETY AND DEPRESSION: ICD-10-CM

## 2024-06-26 DIAGNOSIS — F32.A ANXIETY AND DEPRESSION: ICD-10-CM

## 2024-06-26 DIAGNOSIS — Z12.31 VISIT FOR SCREENING MAMMOGRAM: ICD-10-CM

## 2024-06-26 DIAGNOSIS — I10 ESSENTIAL HYPERTENSION: ICD-10-CM

## 2024-06-26 DIAGNOSIS — E11.65 UNCONTROLLED TYPE 2 DIABETES MELLITUS WITH HYPERGLYCEMIA (HCC): ICD-10-CM

## 2024-06-26 DIAGNOSIS — I25.119 CORONARY ARTERY DISEASE WITH ANGINA PECTORIS, UNSPECIFIED VESSEL OR LESION TYPE, UNSPECIFIED WHETHER NATIVE OR TRANSPLANTED HEART (HCC): ICD-10-CM

## 2024-06-26 RX ORDER — AMLODIPINE BESYLATE 5 MG/1
5 TABLET ORAL DAILY
Qty: 90 TABLET | Refills: 1 | Status: SHIPPED | OUTPATIENT
Start: 2024-06-26

## 2024-06-26 RX ORDER — LISINOPRIL AND HYDROCHLOROTHIAZIDE 20; 12.5 MG/1; MG/1
1 TABLET ORAL DAILY
Qty: 90 TABLET | Refills: 1 | Status: SHIPPED | OUTPATIENT
Start: 2024-06-26

## 2024-06-26 NOTE — TELEPHONE ENCOUNTER
Reason for call:   [x] Refill   [] Prior Auth  [] Other:     Office:   [x] PCP/Provider - Fer Márquez, DO   [] Specialty/Provider -           Does the patient have enough for 3 days?   [x] Yes   [] No - Send as HP to POD

## 2024-08-14 ENCOUNTER — APPOINTMENT (OUTPATIENT)
Dept: LAB | Facility: CLINIC | Age: 74
End: 2024-08-14
Payer: COMMERCIAL

## 2024-08-14 DIAGNOSIS — N18.31 STAGE 3A CHRONIC KIDNEY DISEASE (HCC): ICD-10-CM

## 2024-08-14 DIAGNOSIS — E11.22 TYPE 2 DIABETES MELLITUS WITH STAGE 3B CHRONIC KIDNEY DISEASE, WITH LONG-TERM CURRENT USE OF INSULIN (HCC): ICD-10-CM

## 2024-08-14 DIAGNOSIS — N18.32 TYPE 2 DIABETES MELLITUS WITH STAGE 3B CHRONIC KIDNEY DISEASE, WITH LONG-TERM CURRENT USE OF INSULIN (HCC): ICD-10-CM

## 2024-08-14 DIAGNOSIS — E11.65 UNCONTROLLED TYPE 2 DIABETES MELLITUS WITH HYPERGLYCEMIA (HCC): ICD-10-CM

## 2024-08-14 DIAGNOSIS — Z79.4 TYPE 2 DIABETES MELLITUS WITH STAGE 3B CHRONIC KIDNEY DISEASE, WITH LONG-TERM CURRENT USE OF INSULIN (HCC): ICD-10-CM

## 2024-08-14 DIAGNOSIS — E78.2 MIXED HYPERLIPIDEMIA: ICD-10-CM

## 2024-08-14 LAB
ALBUMIN SERPL BCG-MCNC: 4 G/DL (ref 3.5–5)
ALP SERPL-CCNC: 57 U/L (ref 34–104)
ALT SERPL W P-5'-P-CCNC: 12 U/L (ref 7–52)
ANION GAP SERPL CALCULATED.3IONS-SCNC: 8 MMOL/L (ref 4–13)
AST SERPL W P-5'-P-CCNC: 12 U/L (ref 13–39)
BILIRUB SERPL-MCNC: 0.77 MG/DL (ref 0.2–1)
BUN SERPL-MCNC: 23 MG/DL (ref 5–25)
CALCIUM SERPL-MCNC: 9.6 MG/DL (ref 8.4–10.2)
CHLORIDE SERPL-SCNC: 106 MMOL/L (ref 96–108)
CHOLEST SERPL-MCNC: 93 MG/DL
CO2 SERPL-SCNC: 27 MMOL/L (ref 21–32)
CREAT SERPL-MCNC: 0.98 MG/DL (ref 0.6–1.3)
EST. AVERAGE GLUCOSE BLD GHB EST-MCNC: 246 MG/DL
GFR SERPL CREATININE-BSD FRML MDRD: 56 ML/MIN/1.73SQ M
GLUCOSE P FAST SERPL-MCNC: 128 MG/DL (ref 65–99)
HBA1C MFR BLD: 10.2 %
HDLC SERPL-MCNC: 20 MG/DL
LDLC SERPL CALC-MCNC: 45 MG/DL (ref 0–100)
POTASSIUM SERPL-SCNC: 4.3 MMOL/L (ref 3.5–5.3)
PROT SERPL-MCNC: 7 G/DL (ref 6.4–8.4)
SODIUM SERPL-SCNC: 141 MMOL/L (ref 135–147)
TRIGL SERPL-MCNC: 139 MG/DL
TSH SERPL DL<=0.05 MIU/L-ACNC: 2.87 UIU/ML (ref 0.45–4.5)

## 2024-08-14 PROCEDURE — 80061 LIPID PANEL: CPT

## 2024-08-14 PROCEDURE — 84443 ASSAY THYROID STIM HORMONE: CPT

## 2024-08-14 PROCEDURE — 36415 COLL VENOUS BLD VENIPUNCTURE: CPT

## 2024-08-14 PROCEDURE — 80053 COMPREHEN METABOLIC PANEL: CPT

## 2024-08-14 PROCEDURE — 83036 HEMOGLOBIN GLYCOSYLATED A1C: CPT

## 2024-08-15 ENCOUNTER — APPOINTMENT (OUTPATIENT)
Dept: LAB | Facility: CLINIC | Age: 74
End: 2024-08-15
Payer: COMMERCIAL

## 2024-08-15 DIAGNOSIS — N18.32 TYPE 2 DIABETES MELLITUS WITH STAGE 3B CHRONIC KIDNEY DISEASE, WITH LONG-TERM CURRENT USE OF INSULIN (HCC): ICD-10-CM

## 2024-08-15 DIAGNOSIS — Z79.4 TYPE 2 DIABETES MELLITUS WITH STAGE 3B CHRONIC KIDNEY DISEASE, WITH LONG-TERM CURRENT USE OF INSULIN (HCC): ICD-10-CM

## 2024-08-15 DIAGNOSIS — E11.22 TYPE 2 DIABETES MELLITUS WITH STAGE 3B CHRONIC KIDNEY DISEASE, WITH LONG-TERM CURRENT USE OF INSULIN (HCC): ICD-10-CM

## 2024-08-15 LAB
CREAT UR-MCNC: 40.8 MG/DL
MICROALBUMIN UR-MCNC: 14.4 MG/L
MICROALBUMIN/CREAT 24H UR: 35 MG/G CREATININE (ref 0–30)

## 2024-08-15 PROCEDURE — 82570 ASSAY OF URINE CREATININE: CPT

## 2024-08-15 PROCEDURE — 82043 UR ALBUMIN QUANTITATIVE: CPT

## 2024-09-16 ENCOUNTER — RA CDI HCC (OUTPATIENT)
Dept: OTHER | Facility: HOSPITAL | Age: 74
End: 2024-09-16

## 2024-09-16 NOTE — PROGRESS NOTES
HCC coding opportunities          Chart Reviewed number of suggestions sent to Provider: 1  E11.65     Patients Insurance     Medicare Insurance: [facility] [employer] Advantage

## 2024-09-23 DIAGNOSIS — E78.2 MIXED HYPERLIPIDEMIA: ICD-10-CM

## 2024-09-23 DIAGNOSIS — F32.A ANXIETY AND DEPRESSION: ICD-10-CM

## 2024-09-23 DIAGNOSIS — E11.65 UNCONTROLLED TYPE 2 DIABETES MELLITUS WITH HYPERGLYCEMIA (HCC): ICD-10-CM

## 2024-09-23 DIAGNOSIS — I10 ESSENTIAL HYPERTENSION: ICD-10-CM

## 2024-09-23 DIAGNOSIS — Z12.31 VISIT FOR SCREENING MAMMOGRAM: ICD-10-CM

## 2024-09-23 DIAGNOSIS — I25.119 CORONARY ARTERY DISEASE WITH ANGINA PECTORIS, UNSPECIFIED VESSEL OR LESION TYPE, UNSPECIFIED WHETHER NATIVE OR TRANSPLANTED HEART (HCC): ICD-10-CM

## 2024-09-23 DIAGNOSIS — F41.9 ANXIETY AND DEPRESSION: ICD-10-CM

## 2024-09-23 RX ORDER — AMLODIPINE BESYLATE 5 MG/1
5 TABLET ORAL DAILY
Qty: 90 TABLET | Refills: 1 | Status: SHIPPED | OUTPATIENT
Start: 2024-09-23

## 2024-09-23 RX ORDER — LISINOPRIL AND HYDROCHLOROTHIAZIDE 12.5; 2 MG/1; MG/1
1 TABLET ORAL DAILY
Qty: 90 TABLET | Refills: 1 | Status: SHIPPED | OUTPATIENT
Start: 2024-09-23

## 2024-10-21 ENCOUNTER — CONSULT (OUTPATIENT)
Dept: FAMILY MEDICINE CLINIC | Facility: CLINIC | Age: 74
End: 2024-10-21
Payer: COMMERCIAL

## 2024-10-21 VITALS
DIASTOLIC BLOOD PRESSURE: 76 MMHG | HEART RATE: 80 BPM | BODY MASS INDEX: 38.45 KG/M2 | WEIGHT: 230.8 LBS | HEIGHT: 65 IN | TEMPERATURE: 98.1 F | OXYGEN SATURATION: 98 % | SYSTOLIC BLOOD PRESSURE: 130 MMHG | RESPIRATION RATE: 18 BRPM

## 2024-10-21 DIAGNOSIS — N18.32 TYPE 2 DIABETES MELLITUS WITH STAGE 3B CHRONIC KIDNEY DISEASE, WITH LONG-TERM CURRENT USE OF INSULIN (HCC): ICD-10-CM

## 2024-10-21 DIAGNOSIS — F32.A ANXIETY AND DEPRESSION: ICD-10-CM

## 2024-10-21 DIAGNOSIS — I10 ESSENTIAL HYPERTENSION: ICD-10-CM

## 2024-10-21 DIAGNOSIS — E11.65 UNCONTROLLED TYPE 2 DIABETES MELLITUS WITH HYPERGLYCEMIA (HCC): ICD-10-CM

## 2024-10-21 DIAGNOSIS — E78.2 MIXED HYPERLIPIDEMIA: ICD-10-CM

## 2024-10-21 DIAGNOSIS — E11.3553 TYPE 2 DIABETES MELLITUS WITH STABLE PROLIFERATIVE RETINOPATHY OF BOTH EYES, WITH LONG-TERM CURRENT USE OF INSULIN (HCC): ICD-10-CM

## 2024-10-21 DIAGNOSIS — Z79.4 TYPE 2 DIABETES MELLITUS WITH STABLE PROLIFERATIVE RETINOPATHY OF BOTH EYES, WITH LONG-TERM CURRENT USE OF INSULIN (HCC): ICD-10-CM

## 2024-10-21 DIAGNOSIS — I25.119 CORONARY ARTERY DISEASE WITH ANGINA PECTORIS, UNSPECIFIED VESSEL OR LESION TYPE, UNSPECIFIED WHETHER NATIVE OR TRANSPLANTED HEART (HCC): ICD-10-CM

## 2024-10-21 DIAGNOSIS — H25.811 COMBINED FORMS OF AGE-RELATED CATARACT OF RIGHT EYE: Primary | ICD-10-CM

## 2024-10-21 DIAGNOSIS — Z79.4 TYPE 2 DIABETES MELLITUS WITH STAGE 3B CHRONIC KIDNEY DISEASE, WITH LONG-TERM CURRENT USE OF INSULIN (HCC): ICD-10-CM

## 2024-10-21 DIAGNOSIS — Z12.31 VISIT FOR SCREENING MAMMOGRAM: ICD-10-CM

## 2024-10-21 DIAGNOSIS — I12.9 HYPERTENSIVE RENAL DISEASE, STAGE 1 THROUGH STAGE 4 OR UNSPECIFIED CHRONIC KIDNEY DISEASE: ICD-10-CM

## 2024-10-21 DIAGNOSIS — N18.31 STAGE 3A CHRONIC KIDNEY DISEASE (HCC): ICD-10-CM

## 2024-10-21 DIAGNOSIS — F41.9 ANXIETY AND DEPRESSION: ICD-10-CM

## 2024-10-21 DIAGNOSIS — E11.22 TYPE 2 DIABETES MELLITUS WITH STAGE 3B CHRONIC KIDNEY DISEASE, WITH LONG-TERM CURRENT USE OF INSULIN (HCC): ICD-10-CM

## 2024-10-21 DIAGNOSIS — F33.9 DEPRESSION, RECURRENT (HCC): ICD-10-CM

## 2024-10-21 DIAGNOSIS — Z01.818 PREOPERATIVE EXAMINATION: ICD-10-CM

## 2024-10-21 PROCEDURE — G2211 COMPLEX E/M VISIT ADD ON: HCPCS | Performed by: FAMILY MEDICINE

## 2024-10-21 PROCEDURE — 99215 OFFICE O/P EST HI 40 MIN: CPT | Performed by: FAMILY MEDICINE

## 2024-10-21 RX ORDER — AMLODIPINE BESYLATE 5 MG/1
5 TABLET ORAL DAILY
Qty: 90 TABLET | Refills: 1 | Status: SHIPPED | OUTPATIENT
Start: 2024-10-21

## 2024-10-21 RX ORDER — ATORVASTATIN CALCIUM 40 MG/1
40 TABLET, FILM COATED ORAL DAILY
Qty: 90 TABLET | Refills: 3 | Status: CANCELLED | OUTPATIENT
Start: 2024-10-21

## 2024-10-21 RX ORDER — ATORVASTATIN CALCIUM 40 MG/1
40 TABLET, FILM COATED ORAL DAILY
Qty: 90 TABLET | Refills: 3 | Status: SHIPPED | OUTPATIENT
Start: 2024-10-21

## 2024-10-21 RX ORDER — METFORMIN HYDROCHLORIDE 500 MG/1
500 TABLET, EXTENDED RELEASE ORAL 2 TIMES DAILY
Qty: 180 TABLET | Refills: 3 | Status: SHIPPED | OUTPATIENT
Start: 2024-10-21

## 2024-10-21 RX ORDER — METFORMIN HYDROCHLORIDE 500 MG/1
500 TABLET, EXTENDED RELEASE ORAL 2 TIMES DAILY
Qty: 180 TABLET | Refills: 3 | OUTPATIENT
Start: 2024-10-21

## 2024-10-21 RX ORDER — LISINOPRIL AND HYDROCHLOROTHIAZIDE 12.5; 2 MG/1; MG/1
1 TABLET ORAL DAILY
Qty: 90 TABLET | Refills: 1 | Status: SHIPPED | OUTPATIENT
Start: 2024-10-21

## 2024-10-21 NOTE — LETTER
2024     Lacie Dela Cruz MD  [address] [place] [id number]    Patient: Gin Haynes   YOB: 1950   Date of Visit: 10/21/2024       Dear Dr. Dela Cruz:    Thank you for referring Gin Haynes to me for evaluation. Below are my notes for this consultation.    If you have questions, please do not hesitate to call me. I look forward to following your patient along with you.         Sincerely,        Fer Márquez DO        CC: No Recipients    Fer Márquez DO  10/21/2024  8:19 AM  Sign when Signing Visit  FAMILY MEDICINE PRE-OPERATIVE EVALUATION  [facility]    NAME: Gin Haynes  AGE: 74 y.o. SEX: female  : 1950     DATE: 10/21/2024     Family Medicine Pre-Operative Evaluation:     Chief Complaint: Pre-operative Evaluation     Surgery: Cataract surgery right eye  Anticipated Date of Surgery: 2024  Referring Provider: Lacie Dela Cruz MD    History of Present Illness:     Gin Haynes is a 74 y.o. female who presents to the office today for a preoperative consultation at the request of surgeon, Lacie Dela Cruz MD, who plans on performing right eye cataract surgery on 2024. Planned anesthesia is IV sedation. Patient has a bleeding risk of: no recent abnormal bleeding. Patient does not have objections to receiving blood products if needed. Current anti-platelet/anti-coagulation medications that the patient is prescribed includes: NONE.      Assessment of Chronic Conditions:   Reviewed pt stable         Review of Systems:     Review of Systems   Constitutional:  Negative for chills, fatigue and fever.   HENT:  Negative for congestion, nosebleeds, rhinorrhea, sinus pressure and sore throat.    Eyes:  Negative for discharge and redness.   Respiratory:  Negative for cough and shortness of breath.    Cardiovascular:  Negative for chest pain, palpitations and leg swelling.   Gastrointestinal:   Negative for abdominal pain, blood in stool and nausea.   Endocrine: Negative for cold intolerance, heat intolerance and polyuria.   Genitourinary:  Negative for dysuria and frequency.   Musculoskeletal:  Negative for arthralgias, back pain and myalgias.   Skin:  Negative for rash.   Neurological:  Negative for dizziness, weakness and headaches.   Hematological:  Negative for adenopathy.   Psychiatric/Behavioral:  Negative for behavioral problems and sleep disturbance. The patient is not nervous/anxious.         Problem List:     Patient Active Problem List   Diagnosis   • Mixed hyperlipidemia   • Uncontrolled type 2 diabetes mellitus with hyperglycemia ([facility])   • Anxiety and depression   • Perianal abscess   • Coronary artery disease with angina pectoris ([facility])   • Paronychia of great toe of left foot   • Fracture of right humerus with routine healing   • History of fall   • Syncope and collapse   • COVID-19 virus infection   • Renal insufficiency   • Hypotension, unspecified   • Left against medical advice   • Depression, recurrent ([facility])   • Obesity, morbid ([facility])   • Type 2 diabetes mellitus with stable proliferative retinopathy of both eyes, with long-term current use of insulin ([facility])   • Poison ivy   • Functional urinary incontinence   • Preoperative examination   • Combined forms of age-related cataract of left eye   • Stage 3a chronic kidney disease ([facility])   • Hypertensive kidney disease with chronic kidney disease   • Type 2 diabetes mellitus with chronic kidney disease, with long-term current use of insulin ([facility])   • Combined forms of age-related cataract of right eye        Allergies:     Allergies   Allergen Reactions   • Ciprofloxacin    • Other      Nitro TD Patch   • Simvastatin Hives        Current Medications:       Current Outpatient Medications:   •  amLODIPine (NORVASC) 5 mg tablet, Take 1 tablet (5 mg total) by mouth daily, Disp: 90 tablet, Rfl: 1  •  atorvastatin (LIPITOR) 40 mg tablet, Take 1  tablet (40 mg total) by mouth daily, Disp: 90 tablet, Rfl: 3  •  insulin NPH-insulin regular (NovoLIN 70/30 ReliOn) 100 units/mL subcutaneous injection, Inject 75 Units under the skin 2 (two) times a day before meals, Disp: 100 mL, Rfl: 3  •  Insulin Pen Needle 31G X 6 MM MISC, by Does not apply route, Disp: , Rfl:   •  lisinopril-hydrochlorothiazide (PRINZIDE,ZESTORETIC) 20-12.5 MG per tablet, Take 1 tablet by mouth daily, Disp: 90 tablet, Rfl: 1  •  metFORMIN (GLUCOPHAGE-XR) 500 mg 24 hr tablet, Take 1 tablet (500 mg total) by mouth 2 (two) times a day, Disp: 180 tablet, Rfl: 3  •  ReliOn Ultra Thin Lancets 30G MISC, USE 1 THREE TIMES DAILY (Patient not taking: Reported on 2023), Disp: , Rfl:      Past History:     Past Medical History:   Diagnosis Date   • Coronary artery disease    • Diabetes mellitus (HCC)    • Hypertension         Past Surgical History:   Procedure Laterality Date   • APPENDECTOMY     • CHOLECYSTECTOMY     • EYE SURGERY Left    • LEG SURGERY Right     vein   • TONSILLECTOMY     • VEIN SURGERY     • WRIST SURGERY Right     ganglion cyst        Family History   Problem Relation Age of Onset   • Cancer Mother    • Diabetes Father    • Hypertension Father    • Heart attack Father         Social History     Socioeconomic History   • Marital status:      Spouse name: Not on file   • Number of children: Not on file   • Years of education: Not on file   • Highest education level: Not on file   Occupational History   • Not on file   Tobacco Use   • Smoking status: Former     Current packs/day: 0.00     Average packs/day: 1 pack/day for 3.0 years (3.0 ttl pk-yrs)     Types: Cigarettes     Start date:      Quit date:      Years since quittin.8   • Smokeless tobacco: Never   Vaping Use   • Vaping status: Never Used   Substance and Sexual Activity   • Alcohol use: Not Currently   • Drug use: Never   • Sexual activity: Not on file   Other Topics Concern   • Not on file   Social  "History Narrative   • Not on file     Social Determinants of Health     Financial Resource Strain: Low Risk  (11/2/2023)    Overall Financial Resource Strain (CARDIA)    • Difficulty of Paying Living Expenses: Not very hard   Food Insecurity: Not on file   Transportation Needs: No Transportation Needs (11/2/2023)    PRAPARE - Transportation    • Lack of Transportation (Medical): No    • Lack of Transportation (Non-Medical): No   Physical Activity: Not on file   Stress: Not on file   Social Connections: Unknown (6/18/2024)    Received from [employer]    Social Connections    • How often do you feel lonely or isolated from those around you? (Adult - for ages 18 years and over): Not on file   Intimate Partner Violence: Not on file   Housing Stability: Not on file        Physical Exam:      /76 (BP Location: Left arm, Patient Position: Sitting, Cuff Size: Large)   Pulse 80   Temp 98.1 °F (36.7 °C) (Tympanic)   Resp 18   Ht 5' 5\" (1.651 m)   Wt 105 kg (230 lb 12.8 oz)   SpO2 98%   BMI 38.41 kg/m²     Physical Exam  Vitals and nursing note reviewed.   Constitutional:       General: She is not in acute distress.     Appearance: Normal appearance. She is well-developed. She is obese.   HENT:      Head: Normocephalic and atraumatic.      Right Ear: Tympanic membrane, ear canal and external ear normal.      Left Ear: Tympanic membrane, ear canal and external ear normal.      Nose: Nose normal.      Mouth/Throat:      Mouth: Mucous membranes are moist.      Pharynx: Oropharynx is clear. No oropharyngeal exudate.   Eyes:      General: No scleral icterus.        Right eye: No discharge.         Left eye: No discharge.      Conjunctiva/sclera: Conjunctivae normal.      Pupils: Pupils are equal, round, and reactive to light.   Neck:      Thyroid: No thyromegaly.      Vascular: No JVD.   Cardiovascular:      Rate and Rhythm: Normal rate and regular rhythm.      Heart sounds: Normal heart sounds. No murmur " heard.  Pulmonary:      Effort: Pulmonary effort is normal.      Breath sounds: No wheezing or rales.   Chest:      Chest wall: No tenderness.   Abdominal:      General: Bowel sounds are normal. There is no distension.      Palpations: Abdomen is soft. There is no mass.      Tenderness: There is no abdominal tenderness.   Musculoskeletal:         General: No tenderness or deformity. Normal range of motion.      Cervical back: Normal range of motion.   Lymphadenopathy:      Cervical: No cervical adenopathy.   Skin:     General: Skin is warm and dry.      Capillary Refill: Capillary refill takes less than 2 seconds.      Findings: No rash.   Neurological:      General: No focal deficit present.      Mental Status: She is alert and oriented to person, place, and time.      Cranial Nerves: No cranial nerve deficit.      Coordination: Coordination normal.      Deep Tendon Reflexes: Reflexes are normal and symmetric. Reflexes normal.   Psychiatric:         Mood and Affect: Mood normal.         Behavior: Behavior normal.         Thought Content: Thought content normal.         Judgment: Judgment normal.           Data:     Pre-operative work-up    Laboratory Results: I have personally reviewed the pertinent laboratory results/reports     EKG: Results Review Statement: No pertinent imaging studies reviewed.    Chest x-ray: Results Review Statement: No pertinent imaging studies reviewed.    Previous cardiopulmonary studies within the past year:  Reviewed patient stable cardiovascular stable           Assessment:     1. Combined forms of age-related cataract of right eye        2. Preoperative examination        3. Coronary artery disease with angina pectoris, unspecified vessel or lesion type, unspecified whether native or transplanted heart (HCC)        4. Uncontrolled type 2 diabetes mellitus with hyperglycemia (HCC)  Albumin / creatinine urine ratio    Comprehensive metabolic panel    Hemoglobin A1C    TSH, 3rd generation  with Free T4 reflex    Lipid Panel with Direct LDL reflex      5. Type 2 diabetes mellitus with stable proliferative retinopathy of both eyes, with long-term current use of insulin (HCC)        6. Type 2 diabetes mellitus with stage 3b chronic kidney disease, with long-term current use of insulin (HCC)        7. Stage 3a chronic kidney disease (HCC)        8. Hypertensive renal disease, stage 1 through stage 4 or unspecified chronic kidney disease        9. Anxiety and depression        10. Depression, recurrent (HCC)             Plan:     74 y.o. female with planned surgery: Right eye cataract surgery October 31, 2024.      Cardiac Risk Estimation: per the Revised Cardiac Risk Index (Circ. 100:1043, 1999), the patient's risk factors for cardiac complications include coronary artery disease ,diabetes, putting her in: RCI RISK CLASS II (1 risk factor, risk of major cardiac compl. appr. 1.3%).    1. Further preoperative workup as follows:   - None; no further preoperative work-up is required    2. Medication Management/Recommendations:   - None, continue medication regimen including morning of surgery, with sip of water    3. Prophylaxis for cardiac events with perioperative beta-blockers: not indicated.    4. Patient requires further consultation with: None    Clearance  Patient is CLEARED for surgery without any additional cardiac testing.     Fer Márquez DO  [address] [address]  Phone#  [phone]  Fax#  [phone]

## 2024-10-21 NOTE — PROGRESS NOTES
FAMILY MEDICINE PRE-OPERATIVE EVALUATION  [facility] - [facility]    NAME: Gin Haynes  AGE: 74 y.o. SEX: female  : 1950     DATE: 10/21/2024     Family Medicine Pre-Operative Evaluation:     Chief Complaint: Pre-operative Evaluation     Surgery: Cataract surgery right eye  Anticipated Date of Surgery: 2024  Referring Provider: Lacie Dela Cruz MD    History of Present Illness:     Gin Haynes is a 74 y.o. female who presents to the office today for a preoperative consultation at the request of surgeon, Lacie Dela Cruz MD, who plans on performing right eye cataract surgery on 2024. Planned anesthesia is IV sedation. Patient has a bleeding risk of: no recent abnormal bleeding. Patient does not have objections to receiving blood products if needed. Current anti-platelet/anti-coagulation medications that the patient is prescribed includes: NONE.      Assessment of Chronic Conditions:   Reviewed pt stable         Review of Systems:     Review of Systems   Constitutional:  Negative for chills, fatigue and fever.   HENT:  Negative for congestion, nosebleeds, rhinorrhea, sinus pressure and sore throat.    Eyes:  Negative for discharge and redness.   Respiratory:  Negative for cough and shortness of breath.    Cardiovascular:  Negative for chest pain, palpitations and leg swelling.   Gastrointestinal:  Negative for abdominal pain, blood in stool and nausea.   Endocrine: Negative for cold intolerance, heat intolerance and polyuria.   Genitourinary:  Negative for dysuria and frequency.   Musculoskeletal:  Negative for arthralgias, back pain and myalgias.   Skin:  Negative for rash.   Neurological:  Negative for dizziness, weakness and headaches.   Hematological:  Negative for adenopathy.   Psychiatric/Behavioral:  Negative for behavioral problems and sleep disturbance. The patient is not nervous/anxious.         Problem List:     Patient Active Problem  List   Diagnosis    Mixed hyperlipidemia    Uncontrolled type 2 diabetes mellitus with hyperglycemia ([facility])    Anxiety and depression    Perianal abscess    Coronary artery disease with angina pectoris ([facility])    Paronychia of great toe of left foot    Fracture of right humerus with routine healing    History of fall    Syncope and collapse    COVID-19 virus infection    Renal insufficiency    Hypotension, unspecified    Left against medical advice    Depression, recurrent (HCC)    Obesity, morbid ([facility])    Type 2 diabetes mellitus with stable proliferative retinopathy of both eyes, with long-term current use of insulin ([facility])    Poison ivy    Functional urinary incontinence    Preoperative examination    Combined forms of age-related cataract of left eye    Stage 3a chronic kidney disease ([facility])    Hypertensive kidney disease with chronic kidney disease    Type 2 diabetes mellitus with chronic kidney disease, with long-term current use of insulin ([facility])    Combined forms of age-related cataract of right eye        Allergies:     Allergies   Allergen Reactions    Ciprofloxacin     Other      Nitro TD Patch    Simvastatin Hives        Current Medications:       Current Outpatient Medications:     amLODIPine (NORVASC) 5 mg tablet, Take 1 tablet (5 mg total) by mouth daily, Disp: 90 tablet, Rfl: 1    atorvastatin (LIPITOR) 40 mg tablet, Take 1 tablet (40 mg total) by mouth daily, Disp: 90 tablet, Rfl: 3    insulin NPH-insulin regular (NovoLIN 70/30 ReliOn) 100 units/mL subcutaneous injection, Inject 75 Units under the skin 2 (two) times a day before meals, Disp: 100 mL, Rfl: 3    Insulin Pen Needle 31G X 6 MM MISC, by Does not apply route, Disp: , Rfl:     lisinopril-hydrochlorothiazide (PRINZIDE,ZESTORETIC) 20-12.5 MG per tablet, Take 1 tablet by mouth daily, Disp: 90 tablet, Rfl: 1    metFORMIN (GLUCOPHAGE-XR) 500 mg 24 hr tablet, Take 1 tablet (500 mg total) by mouth 2 (two) times a day, Disp: 180 tablet, Rfl: 3    ReliOn  Ultra Thin Lancets 30G MISC, USE 1 THREE TIMES DAILY (Patient not taking: Reported on 2023), Disp: , Rfl:      Past History:     Past Medical History:   Diagnosis Date    Coronary artery disease     Diabetes mellitus (HCC)     Hypertension         Past Surgical History:   Procedure Laterality Date    APPENDECTOMY      CHOLECYSTECTOMY      EYE SURGERY Left     LEG SURGERY Right     vein    TONSILLECTOMY      VEIN SURGERY      WRIST SURGERY Right     ganglion cyst        Family History   Problem Relation Age of Onset    Cancer Mother     Diabetes Father     Hypertension Father     Heart attack Father         Social History     Socioeconomic History    Marital status:      Spouse name: Not on file    Number of children: Not on file    Years of education: Not on file    Highest education level: Not on file   Occupational History    Not on file   Tobacco Use    Smoking status: Former     Current packs/day: 0.00     Average packs/day: 1 pack/day for 3.0 years (3.0 ttl pk-yrs)     Types: Cigarettes     Start date:      Quit date:      Years since quittin.8    Smokeless tobacco: Never   Vaping Use    Vaping status: Never Used   Substance and Sexual Activity    Alcohol use: Not Currently    Drug use: Never    Sexual activity: Not on file   Other Topics Concern    Not on file   Social History Narrative    Not on file     Social Determinants of Health     Financial Resource Strain: Low Risk  (2023)    Overall Financial Resource Strain (CARDIA)     Difficulty of Paying Living Expenses: Not very hard   Food Insecurity: Not on file   Transportation Needs: No Transportation Needs (2023)    PRAPARE - Transportation     Lack of Transportation (Medical): No     Lack of Transportation (Non-Medical): No   Physical Activity: Not on file   Stress: Not on file   Social Connections: Unknown (2024)    Received from [employer]    Social Connections     How often do you feel lonely or isolated from those  "around you? (Adult - for ages 18 years and over): Not on file   Intimate Partner Violence: Not on file   Housing Stability: Not on file        Physical Exam:      /76 (BP Location: Left arm, Patient Position: Sitting, Cuff Size: Large)   Pulse 80   Temp 98.1 °F (36.7 °C) (Tympanic)   Resp 18   Ht 5' 5\" (1.651 m)   Wt 105 kg (230 lb 12.8 oz)   SpO2 98%   BMI 38.41 kg/m²     Physical Exam  Vitals and nursing note reviewed.   Constitutional:       General: She is not in acute distress.     Appearance: Normal appearance. She is well-developed. She is obese.   HENT:      Head: Normocephalic and atraumatic.      Right Ear: Tympanic membrane, ear canal and external ear normal.      Left Ear: Tympanic membrane, ear canal and external ear normal.      Nose: Nose normal.      Mouth/Throat:      Mouth: Mucous membranes are moist.      Pharynx: Oropharynx is clear. No oropharyngeal exudate.   Eyes:      General: No scleral icterus.        Right eye: No discharge.         Left eye: No discharge.      Conjunctiva/sclera: Conjunctivae normal.      Pupils: Pupils are equal, round, and reactive to light.   Neck:      Thyroid: No thyromegaly.      Vascular: No JVD.   Cardiovascular:      Rate and Rhythm: Normal rate and regular rhythm.      Heart sounds: Normal heart sounds. No murmur heard.  Pulmonary:      Effort: Pulmonary effort is normal.      Breath sounds: No wheezing or rales.   Chest:      Chest wall: No tenderness.   Abdominal:      General: Bowel sounds are normal. There is no distension.      Palpations: Abdomen is soft. There is no mass.      Tenderness: There is no abdominal tenderness.   Musculoskeletal:         General: No tenderness or deformity. Normal range of motion.      Cervical back: Normal range of motion.   Lymphadenopathy:      Cervical: No cervical adenopathy.   Skin:     General: Skin is warm and dry.      Capillary Refill: Capillary refill takes less than 2 seconds.      Findings: No rash. "   Neurological:      General: No focal deficit present.      Mental Status: She is alert and oriented to person, place, and time.      Cranial Nerves: No cranial nerve deficit.      Coordination: Coordination normal.      Deep Tendon Reflexes: Reflexes are normal and symmetric. Reflexes normal.   Psychiatric:         Mood and Affect: Mood normal.         Behavior: Behavior normal.         Thought Content: Thought content normal.         Judgment: Judgment normal.           Data:     Pre-operative work-up    Laboratory Results: I have personally reviewed the pertinent laboratory results/reports     EKG: Results Review Statement: No pertinent imaging studies reviewed.    Chest x-ray: Results Review Statement: No pertinent imaging studies reviewed.    Previous cardiopulmonary studies within the past year:  Reviewed patient stable cardiovascular stable           Assessment:     1. Combined forms of age-related cataract of right eye        2. Preoperative examination        3. Coronary artery disease with angina pectoris, unspecified vessel or lesion type, unspecified whether native or transplanted heart ([facility])        4. Uncontrolled type 2 diabetes mellitus with hyperglycemia ([facility])  Albumin / creatinine urine ratio    Comprehensive metabolic panel    Hemoglobin A1C    TSH, 3rd generation with Free T4 reflex    Lipid Panel with Direct LDL reflex      5. Type 2 diabetes mellitus with stable proliferative retinopathy of both eyes, with long-term current use of insulin ([facility])        6. Type 2 diabetes mellitus with stage 3b chronic kidney disease, with long-term current use of insulin ([facility])        7. Stage 3a chronic kidney disease ([facility])        8. Hypertensive renal disease, stage 1 through stage 4 or unspecified chronic kidney disease        9. Anxiety and depression        10. Depression, recurrent ([facility])             Plan:     74 y.o. female with planned surgery: Right eye cataract surgery October 31, 2024.      Cardiac Risk  Estimation: per the Revised Cardiac Risk Index (Circ. 100:1043, 1999), the patient's risk factors for cardiac complications include coronary artery disease ,diabetes, putting her in: RCI RISK CLASS II (1 risk factor, risk of major cardiac compl. appr. 1.3%).    1. Further preoperative workup as follows:   - None; no further preoperative work-up is required    2. Medication Management/Recommendations:   - None, continue medication regimen including morning of surgery, with sip of water    3. Prophylaxis for cardiac events with perioperative beta-blockers: not indicated.    4. Patient requires further consultation with: None    Clearance  Patient is CLEARED for surgery without any additional cardiac testing.     Fer Márquez DO  [facility]  [address] [facility] [address]  Phone#  [phone]  Fax#  [phone]

## 2024-10-23 ENCOUNTER — TELEPHONE (OUTPATIENT)
Age: 74
End: 2024-10-23

## 2024-10-23 ENCOUNTER — TELEPHONE (OUTPATIENT)
Dept: FAMILY MEDICINE CLINIC | Facility: CLINIC | Age: 74
End: 2024-10-23

## 2024-10-23 NOTE — TELEPHONE ENCOUNTER
Jacqueline, [facility],   Is calling for progress notes from Oct 21,2024  Faxed [phone]

## 2024-10-23 NOTE — TELEPHONE ENCOUNTER
Jacqueline, [facility], called regarding fax they received for pt's pre op visit. They received the cover sheet, however they did not get the health record for pt that was supposed to be attached. Please fax this information to #[phone]. Please advise.

## 2024-11-15 ENCOUNTER — TELEPHONE (OUTPATIENT)
Dept: FAMILY MEDICINE CLINIC | Facility: CLINIC | Age: 74
End: 2024-11-15

## 2024-11-15 NOTE — TELEPHONE ENCOUNTER
Tried to call pt to jena appt on 12/23/24 at 3:20pm move it to 12/30/2024 at  4:20pm. Left a VM to call office back to confirm the date and time.

## 2024-12-02 ENCOUNTER — TELEPHONE (OUTPATIENT)
Dept: FAMILY MEDICINE CLINIC | Facility: CLINIC | Age: 74
End: 2024-12-02

## 2024-12-27 DIAGNOSIS — I25.119 CORONARY ARTERY DISEASE WITH ANGINA PECTORIS, UNSPECIFIED VESSEL OR LESION TYPE, UNSPECIFIED WHETHER NATIVE OR TRANSPLANTED HEART (HCC): ICD-10-CM

## 2024-12-27 DIAGNOSIS — E11.65 UNCONTROLLED TYPE 2 DIABETES MELLITUS WITH HYPERGLYCEMIA (HCC): ICD-10-CM

## 2024-12-27 DIAGNOSIS — F32.A ANXIETY AND DEPRESSION: ICD-10-CM

## 2024-12-27 DIAGNOSIS — I10 ESSENTIAL HYPERTENSION: ICD-10-CM

## 2024-12-27 DIAGNOSIS — F41.9 ANXIETY AND DEPRESSION: ICD-10-CM

## 2024-12-27 DIAGNOSIS — E78.2 MIXED HYPERLIPIDEMIA: ICD-10-CM

## 2024-12-27 DIAGNOSIS — Z12.31 VISIT FOR SCREENING MAMMOGRAM: ICD-10-CM

## 2024-12-27 RX ORDER — LISINOPRIL AND HYDROCHLOROTHIAZIDE 12.5; 2 MG/1; MG/1
1 TABLET ORAL DAILY
Qty: 90 TABLET | Refills: 1 | Status: SHIPPED | OUTPATIENT
Start: 2024-12-27

## 2024-12-27 RX ORDER — AMLODIPINE BESYLATE 5 MG/1
5 TABLET ORAL DAILY
Qty: 90 TABLET | Refills: 1 | Status: SHIPPED | OUTPATIENT
Start: 2024-12-27

## 2024-12-27 NOTE — TELEPHONE ENCOUNTER
Pharmacy told patient there were no more refills on file    Reason for call:   [x] Refill   [] Prior Auth  [] Other:     Office:   [x] PCP/Provider - Fer Márquez DO   [] Specialty/Provider -     Medication:     - lisinopril-hydrochlorothiazide (PRINZIDE,ZESTORETIC) 20-12.5 MG per tablet   Take 1 tablet by mouth daily   Qty: 90    - amLODIPine (NORVASC) 5 mg tablet    Take 1 tablet (5 mg total) by mouth daily   Qty: 90    Pharmacy: [facility] [id number] [address] [address]     Does the patient have enough for 3 days?   [] Yes   [x] No - Send as HP to POD

## 2024-12-31 ENCOUNTER — RA CDI HCC (OUTPATIENT)
Dept: OTHER | Facility: HOSPITAL | Age: 74
End: 2024-12-31

## 2025-01-03 ENCOUNTER — TELEPHONE (OUTPATIENT)
Age: 75
End: 2025-01-03

## 2025-01-03 NOTE — TELEPHONE ENCOUNTER
Acknowledged     Spoke with pt about her sore throat and she stated she will wait until Monday to speak with the provider.

## 2025-01-03 NOTE — TELEPHONE ENCOUNTER
Patient is scheduled for AWV on 1/6/25 with Dr. Márquez, she called to confirm.  She mentioned she woke up with a sore throat today and asked if Dr. Márquez would want to prescribe something for her or if she should just wait to be seen on Monday.  Please advise.

## 2025-01-05 ENCOUNTER — TELEPHONE (OUTPATIENT)
Dept: OTHER | Facility: OTHER | Age: 75
End: 2025-01-05

## 2025-01-05 NOTE — TELEPHONE ENCOUNTER
Patient is calling regarding cancelling an appointment.    Date/Time: 1/6/25 3:20pm    Patient was rescheduled: YES [] NO [x]    Patient requesting call back to reschedule: YES [] NO [x]    Pt reached answering service, she is sick and requested to cx appt. Pt declined rescheduling at this time, stated she will call when ready.

## 2025-03-17 ENCOUNTER — RA CDI HCC (OUTPATIENT)
Dept: OTHER | Facility: HOSPITAL | Age: 75
End: 2025-03-17

## 2025-03-17 PROBLEM — Z86.16 PERSONAL HISTORY OF COVID-19: Status: ACTIVE | Noted: 2025-03-17

## 2025-03-17 PROBLEM — Z86.16 PERSONAL HISTORY OF COVID-19: Status: ACTIVE | Noted: 2020-12-21

## 2025-03-17 NOTE — PROGRESS NOTES
HCC coding opportunities    E11.65  I12.9 for abrilisinger  GR     Chart Reviewed number of suggestions sent to Provider: 2     Patients Insurance     Medicare Insurance: [employer]

## 2025-03-21 ENCOUNTER — OFFICE VISIT (OUTPATIENT)
Dept: FAMILY MEDICINE CLINIC | Facility: CLINIC | Age: 75
End: 2025-03-21
Payer: COMMERCIAL

## 2025-03-21 VITALS
RESPIRATION RATE: 20 BRPM | DIASTOLIC BLOOD PRESSURE: 72 MMHG | HEART RATE: 67 BPM | WEIGHT: 215.6 LBS | HEIGHT: 65 IN | SYSTOLIC BLOOD PRESSURE: 124 MMHG | TEMPERATURE: 97.9 F | OXYGEN SATURATION: 100 % | BODY MASS INDEX: 35.92 KG/M2

## 2025-03-21 DIAGNOSIS — N18.31 STAGE 3A CHRONIC KIDNEY DISEASE (HCC): ICD-10-CM

## 2025-03-21 DIAGNOSIS — E11.3553 TYPE 2 DIABETES MELLITUS WITH STABLE PROLIFERATIVE RETINOPATHY OF BOTH EYES, WITH LONG-TERM CURRENT USE OF INSULIN (HCC): ICD-10-CM

## 2025-03-21 DIAGNOSIS — Z79.4 TYPE 2 DIABETES MELLITUS WITH STABLE PROLIFERATIVE RETINOPATHY OF BOTH EYES, WITH LONG-TERM CURRENT USE OF INSULIN (HCC): ICD-10-CM

## 2025-03-21 DIAGNOSIS — E11.65 UNCONTROLLED TYPE 2 DIABETES MELLITUS WITH HYPERGLYCEMIA (HCC): ICD-10-CM

## 2025-03-21 DIAGNOSIS — N18.32 TYPE 2 DIABETES MELLITUS WITH STAGE 3B CHRONIC KIDNEY DISEASE, WITH LONG-TERM CURRENT USE OF INSULIN (HCC): ICD-10-CM

## 2025-03-21 DIAGNOSIS — I10 ESSENTIAL HYPERTENSION: ICD-10-CM

## 2025-03-21 DIAGNOSIS — E66.01 OBESITY, MORBID (HCC): ICD-10-CM

## 2025-03-21 DIAGNOSIS — E78.2 MIXED HYPERLIPIDEMIA: ICD-10-CM

## 2025-03-21 DIAGNOSIS — F32.A ANXIETY AND DEPRESSION: ICD-10-CM

## 2025-03-21 DIAGNOSIS — Z00.00 MEDICARE ANNUAL WELLNESS VISIT, SUBSEQUENT: Primary | ICD-10-CM

## 2025-03-21 DIAGNOSIS — Z79.4 TYPE 2 DIABETES MELLITUS WITH STAGE 3B CHRONIC KIDNEY DISEASE, WITH LONG-TERM CURRENT USE OF INSULIN (HCC): ICD-10-CM

## 2025-03-21 DIAGNOSIS — I25.119 CORONARY ARTERY DISEASE WITH ANGINA PECTORIS, UNSPECIFIED VESSEL OR LESION TYPE, UNSPECIFIED WHETHER NATIVE OR TRANSPLANTED HEART (HCC): ICD-10-CM

## 2025-03-21 DIAGNOSIS — Z12.31 VISIT FOR SCREENING MAMMOGRAM: ICD-10-CM

## 2025-03-21 DIAGNOSIS — F41.9 ANXIETY AND DEPRESSION: ICD-10-CM

## 2025-03-21 DIAGNOSIS — E11.22 TYPE 2 DIABETES MELLITUS WITH STAGE 3B CHRONIC KIDNEY DISEASE, WITH LONG-TERM CURRENT USE OF INSULIN (HCC): ICD-10-CM

## 2025-03-21 PROCEDURE — 99214 OFFICE O/P EST MOD 30 MIN: CPT | Performed by: FAMILY MEDICINE

## 2025-03-21 PROCEDURE — G2211 COMPLEX E/M VISIT ADD ON: HCPCS | Performed by: FAMILY MEDICINE

## 2025-03-21 PROCEDURE — G0439 PPPS, SUBSEQ VISIT: HCPCS | Performed by: FAMILY MEDICINE

## 2025-03-21 RX ORDER — LISINOPRIL AND HYDROCHLOROTHIAZIDE 12.5; 2 MG/1; MG/1
1 TABLET ORAL DAILY
Qty: 90 TABLET | Refills: 1 | Status: SHIPPED | OUTPATIENT
Start: 2025-03-21

## 2025-03-21 NOTE — ASSESSMENT & PLAN NOTE
Mil will work on increasing insulin back to normal dosing and working on weight reduction in the spring and follow-up with me in June

## 2025-03-21 NOTE — ASSESSMENT & PLAN NOTE
Patient notes significant diarrhea from metformin at this time we will stop the metformin and she will go back to the 75 and 75 of the insulin 70/30 she had been decreasing the dosage on that due to overall stopping her diet at times not eating to avoid diarrhea if she needed to go out.  Recheck A1c lab work 3 months follow-up here  Lab Results   Component Value Date    HGBA1C 10.2 (H) 08/14/2024

## 2025-03-21 NOTE — ASSESSMENT & PLAN NOTE
Lab Results   Component Value Date    HGBA1C 10.2 (H) 08/14/2024   Stable monitor A1c at next office visit in 3 months increase insulin back to 75 and 75 twice daily stop metformin

## 2025-03-21 NOTE — ASSESSMENT & PLAN NOTE
Mixed hyperlipidemia stable monitoring laboratory work continuing medications as scheduled and reevaluate at next visit    Orders:    lisinopril-hydrochlorothiazide (PRINZIDE,ZESTORETIC) 20-12.5 MG per tablet; Take 1 tablet by mouth daily

## 2025-03-21 NOTE — ASSESSMENT & PLAN NOTE
Lab Results   Component Value Date    EGFR 56 08/14/2024    EGFR 52 08/07/2023    EGFR 61 02/24/2023    CREATININE 0.98 08/14/2024    CREATININE 1.05 08/07/2023    CREATININE 0.92 02/24/2023   GFR 56 creatinine 0.98 monitor and reevaluate laboratory work next office visit patient's been stable continue with plenty of hydration

## 2025-03-21 NOTE — ASSESSMENT & PLAN NOTE
Follow-up here 3 months A1c at that time in June  Lab Results   Component Value Date    HGBA1C 10.2 (H) 08/14/2024       Orders:    lisinopril-hydrochlorothiazide (PRINZIDE,ZESTORETIC) 20-12.5 MG per tablet; Take 1 tablet by mouth daily    Albumin / creatinine urine ratio; Standing    Comprehensive metabolic panel; Standing    Hemoglobin A1C; Standing    TSH, 3rd generation with Free T4 reflex; Standing    Lipid Panel with Direct LDL reflex; Standing

## 2025-03-21 NOTE — PROGRESS NOTES
Name: Gin Haynes      : 1950      MRN: [id number]  Encounter Provider: Fer Márquez DO  Encounter Date: 3/21/2025   Encounter department: [facility]    Assessment & Plan  Uncontrolled type 2 diabetes mellitus with hyperglycemia (HCC)  Follow-up here 3 months A1c at that time in   Lab Results   Component Value Date    HGBA1C 10.2 (H) 2024       Orders:    lisinopril-hydrochlorothiazide (PRINZIDE,ZESTORETIC) 20-12.5 MG per tablet; Take 1 tablet by mouth daily    Albumin / creatinine urine ratio; Standing    Comprehensive metabolic panel; Standing    Hemoglobin A1C; Standing    TSH, 3rd generation with Free T4 reflex; Standing    Lipid Panel with Direct LDL reflex; Standing    Essential hypertension    Orders:    lisinopril-hydrochlorothiazide (PRINZIDE,ZESTORETIC) 20-12.5 MG per tablet; Take 1 tablet by mouth daily    Coronary artery disease with angina pectoris, unspecified vessel or lesion type, unspecified whether native or transplanted heart (HCC)  No chest pain or anginal symptoms follow-up with me as scheduled next office visit continue all medications    Orders:    lisinopril-hydrochlorothiazide (PRINZIDE,ZESTORETIC) 20-12.5 MG per tablet; Take 1 tablet by mouth daily    Anxiety and depression      Orders:    lisinopril-hydrochlorothiazide (PRINZIDE,ZESTORETIC) 20-12.5 MG per tablet; Take 1 tablet by mouth daily    Mixed hyperlipidemia  Mixed hyperlipidemia stable monitoring laboratory work continuing medications as scheduled and reevaluate at next visit    Orders:    lisinopril-hydrochlorothiazide (PRINZIDE,ZESTORETIC) 20-12.5 MG per tablet; Take 1 tablet by mouth daily    Visit for screening mammogram    Orders:    lisinopril-hydrochlorothiazide (PRINZIDE,ZESTORETIC) 20-12.5 MG per tablet; Take 1 tablet by mouth daily    Medicare annual wellness visit, subsequent         Type 2 diabetes mellitus with stage 3b chronic kidney disease, with long-term current  use of insulin (HCC)  Patient notes significant diarrhea from metformin at this time we will stop the metformin and she will go back to the 75 and 75 of the insulin 70/30 she had been decreasing the dosage on that due to overall stopping her diet at times not eating to avoid diarrhea if she needed to go out.  Recheck A1c lab work 3 months follow-up here  Lab Results   Component Value Date    HGBA1C 10.2 (H) 08/14/2024            Stage 3a chronic kidney disease ([facility])  Lab Results   Component Value Date    EGFR 56 08/14/2024    EGFR 52 08/07/2023    EGFR 61 02/24/2023    CREATININE 0.98 08/14/2024    CREATININE 1.05 08/07/2023    CREATININE 0.92 02/24/2023   GFR 56 creatinine 0.98 monitor and reevaluate laboratory work next office visit patient's been stable continue with plenty of hydration         Type 2 diabetes mellitus with stable proliferative retinopathy of both eyes, with long-term current use of insulin ([facility])    Lab Results   Component Value Date    HGBA1C 10.2 (H) 08/14/2024   Stable monitor A1c at next office visit in 3 months increase insulin back to 75 and 75 twice daily stop metformin         Obesity, morbid ([facility])    Stable will work on increasing insulin back to normal dosing and working on weight reduction in the spring and follow-up with me in June            Preventive health issues were discussed with patient, and age appropriate screening tests were ordered as noted in patient's After Visit Summary. Personalized health advice and appropriate referrals for health education or preventive services given if needed, as noted in patient's After Visit Summary.    History of Present Illness     Patient presents for follow-up evaluation unable to tolerate metformin and would like to stop this due to diabetic gastroparesis with continuous worsening diarrhea.  And due to that she is unable to go to bin she is unable to do social activities and she stopped her evening insulin dose       Patient Care  Team:  Fer Márquez DO as PCP - General (Family Medicine)    Review of Systems   Constitutional:  Negative for chills, fatigue and fever.   HENT:  Negative for congestion, nosebleeds, rhinorrhea, sinus pressure and sore throat.    Eyes:  Negative for discharge and redness.   Respiratory:  Negative for cough and shortness of breath.    Cardiovascular:  Negative for chest pain, palpitations and leg swelling.   Gastrointestinal:  Negative for abdominal pain, blood in stool and nausea.   Endocrine: Negative for cold intolerance, heat intolerance and polyuria.   Genitourinary:  Negative for dysuria and frequency.   Musculoskeletal:  Negative for arthralgias, back pain and myalgias.   Skin:  Negative for rash.   Neurological:  Negative for dizziness, weakness and headaches.   Hematological:  Negative for adenopathy.   Psychiatric/Behavioral:  Negative for behavioral problems and sleep disturbance. The patient is not nervous/anxious.      Medical History Reviewed by provider this encounter:  Tobacco  Allergies  Meds  Problems  Med Hx  Surg Hx  Fam Hx       Annual Wellness Visit Questionnaire   Gin is here for her Subsequent Wellness visit. Last Medicare Wellness visit information reviewed, patient interviewed, no change since last AWV.     Depression Screening:   PHQ-9 Score: 3      Fall Risk Screening:   In the past year, patient has experienced: history of falling in past year    Number of falls: 1  Injured during fall?: No      Urinary Incontinence Screening:   Patient has leaked urine accidently in the last six months.     Home Safety:  Patient has trouble with stairs inside or outside of their home. Patient has working smoke alarms and has working carbon monoxide detector. Home safety hazards include: none.     Nutrition:   Current diet is Regular.     Medications:   Patient is currently taking over-the-counter supplements. OTC medications include: see medication list. Patient is able to manage  medications.     Activities of Daily Living (ADLs)/Instrumental Activities of Daily Living (IADLs):   Walk and transfer into and out of bed and chair?: Yes  Dress and groom yourself?: Yes    Bathe or shower yourself?: Yes    Feed yourself? Yes  Do your laundry/housekeeping?: Yes  Manage your money, pay your bills and track your expenses?: Yes  Make your own meals?: Yes    Do your own shopping?: Yes    Previous Hospitalizations:   Any hospitalizations or ED visits within the last 12 months?: No      PREVENTIVE SCREENINGS      Cardiovascular Screening:    General: Screening Not Indicated and History Lipid Disorder      Diabetes Screening:     General: Screening Not Indicated and History Diabetes      Cervical Cancer Screening:    General: Screening Not Indicated      Lung Cancer Screening:     General: Screening Not Indicated      Hepatitis C Screening:    General: Screening Current    Screening, Brief Intervention, and Referral to Treatment (SBIRT)     Screening  Typical number of drinks in a day: 0  Typical number of drinks in a week: 0  Interpretation: Low risk drinking behavior.    Single Item Drug Screening:  How often have you used an illegal drug (including marijuana) or a prescription medication for non-medical reasons in the past year? never    Single Item Drug Screen Score: 0  Interpretation: Negative screen for possible drug use disorder    Social Drivers of Health     Financial Resource Strain: Low Risk  (11/2/2023)    Overall Financial Resource Strain (CARDIA)     Difficulty of Paying Living Expenses: Not very hard   Food Insecurity: No Food Insecurity (3/21/2025)    Hunger Vital Sign     Worried About Running Out of Food in the Last Year: Never true     Ran Out of Food in the Last Year: Never true   Transportation Needs: No Transportation Needs (3/21/2025)    PRAPARE - Transportation     Lack of Transportation (Medical): No     Lack of Transportation (Non-Medical): No   Housing Stability: Low Risk   "(3/21/2025)    Housing Stability Vital Sign     Unable to Pay for Housing in the Last Year: No     Number of Times Moved in the Last Year: 0     Homeless in the Last Year: No   Utilities: Not At Risk (3/21/2025)    [facility] Utilities     Threatened with loss of utilities: No     No results found.    Objective   /72 (BP Location: Left arm, Patient Position: Sitting, Cuff Size: Standard)   Pulse 67   Temp 97.9 °F (36.6 °C) (Tympanic)   Resp 20   Ht 5' 5\" (1.651 m)   Wt 97.8 kg (215 lb 9.6 oz)   SpO2 100%   BMI 35.88 kg/m²     Physical Exam  Vitals and nursing note reviewed.   Constitutional:       General: She is not in acute distress.     Appearance: Normal appearance. She is well-developed.   HENT:      Head: Normocephalic and atraumatic.      Right Ear: Tympanic membrane and external ear normal.      Left Ear: Tympanic membrane and external ear normal.      Nose: Nose normal.      Mouth/Throat:      Mouth: Mucous membranes are moist.      Pharynx: Oropharynx is clear. No oropharyngeal exudate.   Eyes:      General: No scleral icterus.        Right eye: No discharge.         Left eye: No discharge.      Conjunctiva/sclera: Conjunctivae normal.      Pupils: Pupils are equal, round, and reactive to light.   Neck:      Thyroid: No thyromegaly.      Vascular: No JVD.   Cardiovascular:      Rate and Rhythm: Normal rate and regular rhythm.      Heart sounds: Normal heart sounds. No murmur heard.  Pulmonary:      Effort: Pulmonary effort is normal.      Breath sounds: No wheezing or rales.   Chest:      Chest wall: No tenderness.   Abdominal:      General: Bowel sounds are normal. There is no distension.      Palpations: Abdomen is soft. There is no mass.      Tenderness: There is no abdominal tenderness.   Musculoskeletal:         General: No tenderness or deformity. Normal range of motion.      Cervical back: Normal range of motion.   Lymphadenopathy:      Cervical: No cervical adenopathy.   Skin:     General: " Skin is warm and dry.      Findings: No rash.   Neurological:      General: No focal deficit present.      Mental Status: She is alert and oriented to person, place, and time.      Cranial Nerves: No cranial nerve deficit.      Coordination: Coordination normal.      Deep Tendon Reflexes: Reflexes are normal and symmetric. Reflexes normal.   Psychiatric:         Mood and Affect: Mood normal.         Behavior: Behavior normal.         Thought Content: Thought content normal.         Judgment: Judgment normal.

## 2025-03-21 NOTE — ASSESSMENT & PLAN NOTE
No chest pain or anginal symptoms follow-up with me as scheduled next office visit continue all medications    Orders:    lisinopril-hydrochlorothiazide (PRINZIDE,ZESTORETIC) 20-12.5 MG per tablet; Take 1 tablet by mouth daily

## 2025-03-21 NOTE — ASSESSMENT & PLAN NOTE
Orders:    lisinopril-hydrochlorothiazide (PRINZIDE,ZESTORETIC) 20-12.5 MG per tablet; Take 1 tablet by mouth daily

## 2025-04-20 PROBLEM — Z00.00 MEDICARE ANNUAL WELLNESS VISIT, SUBSEQUENT: Status: RESOLVED | Noted: 2020-07-14 | Resolved: 2025-04-20

## 2025-05-05 DIAGNOSIS — E11.65 UNCONTROLLED TYPE 2 DIABETES MELLITUS WITH HYPERGLYCEMIA (HCC): ICD-10-CM

## 2025-05-05 RX ORDER — HUMAN INSULIN 100 [IU]/ML
75 INJECTION, SUSPENSION SUBCUTANEOUS
Qty: 45 ML | Refills: 0 | Status: SHIPPED | OUTPATIENT
Start: 2025-05-05

## 2025-05-05 NOTE — TELEPHONE ENCOUNTER
José Miguel from [facility] pharmacy called and stated that this medication either has to be 50ml 5 vials or 40 ml or they would like to know if this is supposed to be a pen.      Please advise

## 2025-05-05 NOTE — TELEPHONE ENCOUNTER
Patient needs updated blood work and has previously placed orders. Please contact patient to go for labs. Courtesy refill provided.      Pt needs A1c done

## 2025-05-05 NOTE — TELEPHONE ENCOUNTER
Reason for call:   [x] Refill   [] Prior Auth  [] Other:     Office:   [x] PCP/Provider - Fer Márquez, DO HARIS [facility]ALLEY   [] Specialty/Provider -     Medication: insulin NPH-insulin regular Novolin N    Dose/Frequency: 70/30  100U/ML  inject 75 Units Two times a day before meals    Quantity: 100ml    Pharmacy: [id number]    Local Pharmacy   Does the patient have enough for 3 days?   [] Yes   [x] No - Send as HP to POD    Mail Away Pharmacy   Does the patient have enough for 10 days?   [] Yes   [] No - Send as HP to POD

## 2025-05-06 NOTE — UTILIZATION REVIEW
Initial Clinical Review    Admission: Date/Time/Statement:   Admission Orders (From admission, onward)     Ordered        12/21/20 2116  Inpatient Admission  Once                   Orders Placed This Encounter   Procedures    Inpatient Admission     Standing Status:   Standing     Number of Occurrences:   1     Order Specific Question:   Admitting Physician     Answer:   Sarah Fabry [[id number]]     Order Specific Question:   Level of Care     Answer:   Med Surg [[id number]]     Order Specific Question:   Bed request comments     Answer: With tele     Order Specific Question:   Estimated length of stay     Answer:   More than 2 Midnights     Order Specific Question:   Certification     Answer:   I certify that inpatient services are medically necessary for this patient for a duration of greater than two midnights  See H&P and MD Progress Notes for additional information about the patient's course of treatment  ED Arrival Information     Expected Arrival Acuity Means of Arrival Escorted By Service Admission Type    12/21/2020 12/21/2020 19:11 [facility]    Arrival Complaint    syncope        Chief Complaint   Patient presents with    Syncope     had a hot flash followed by syncope at the grocery store, right arm pain althought chronic  denies chest pain, SOB, sweats  seen at [facility] in [place]     Assessment/Plan:   65 yof to er from urgent care s/p syncopal episode with fall for hypotension  Hx dm, htn  Presents fatigued with arthralgias, diarrhea & tenderness R proximal humerus, decreased ROM from old fx & pain  Admission work-up showing COVID+, hypomagnesemia, hallie  Admitted to inpatient status for syncope & collapse, started on ivf & neuro checks  Hold all blood pressure medications including Norvasc, lisinopril, HCTZ  Ortho, cardio & renal consulted      ED Triage Vitals   Temperature Pulse Respirations Blood Pressure SpO2   12/21/20 2318 12/21/20 1914 Phoned the patient and left VM. Stated to call the pain clinic to schedule injection procedure at [phone].   12/21/20 1914 12/21/20 1914 12/21/20 1914   98 2 °F (36 8 °C) 71 16 100/59 98 %      Temp Source Heart Rate Source Patient Position - Orthostatic VS BP Location FiO2 (%)   12/21/20 2318 12/21/20 1914 12/21/20 1914 12/21/20 1914 --   Temporal Monitor Lying Left arm       Pain Score       12/21/20 2330       No Pain          Wt Readings from Last 1 Encounters:   12/22/20 92 6 kg (204 lb 2 3 oz)     Additional Vital Signs:   12/22/20 0550  --  86  --  177/97Abnormal   --  --  --  --   12/22/20 0546  --  75  --  165/74  --  --  --  --   12/22/20 0300  97 7 °F (36 5 °C)  83  18  145/70  --  94 %  None (Room air)  Lying   12/21/20 2318  98 2 °F (36 8 °C)  70  18  124/60  86  99 %  None (Room air)  Lying   12/21/20 1914  --  71  16  100/59  --  98 %  None (Room air)  Lying     Pertinent Labs/Diagnostic Test Results:   Results from last 7 days   Lab Units 12/21/20 2006   SARS-COV-2  Positive*     Results from last 7 days   Lab Units 12/22/20  0805 12/22/20  0544 12/22/20  0126 12/21/20 2006   WBC Thousand/uL 3 70* 3 40*  --  5 10   HEMOGLOBIN g/dL 12 1 12 1  --  13 1   HEMATOCRIT % 36 3* 35 7*  --  39 0*   PLATELETS Thousands/uL 247 250 244 258   NEUTROS ABS Thousands/µL 1 60 1 60  --  3 70     Results from last 7 days   Lab Units 12/22/20  0805 12/21/20  2132 12/21/20 2006   SODIUM mmol/L 137  --  135   POTASSIUM mmol/L 3 5  --  4 0   CHLORIDE mmol/L 104  --  101   CO2 mmol/L 25  --  27   ANION GAP mmol/L 8  --  7   BUN mg/dL 19  --  24   CREATININE mg/dL 0 91  --  1 56*   EGFR ml/min/1 73sq m 63  --  33   CALCIUM mg/dL 8 4*  --  8 9   CALCIUM, IONIZED mmol/L  --  1 14  --    MAGNESIUM mg/dL 1 5*  --   --      Results from last 7 days   Lab Units 12/21/20 2006   AST U/L 9*   ALT U/L 11   ALK PHOS U/L 41*   TOTAL PROTEIN g/dL 7 0   ALBUMIN g/dL 4 1   TOTAL BILIRUBIN mg/dL 0 60     Results from last 7 days   Lab Units 12/22/20  0548 12/22/20  0100   POC GLUCOSE mg/dl 167* 166*     Results from last 7 days   Lab Units 12/22/20  0805 12/21/20 2006   GLUCOSE RANDOM mg/dL 178* 208*     BETA-HYDROXYBUTYRATE   Date Value Ref Range Status   06/19/2020 3 2 (H) <0 6 mmol/L Final      Results from last 7 days   Lab Units 12/21/20 2006   CK TOTAL U/L 17*     Results from last 7 days   Lab Units 12/22/20  0544 12/22/20  0126 12/21/20 2006   TROPONIN I ng/mL <0 03 <0 03 <0 03     Results from last 7 days   Lab Units 12/22/20  0545 12/21/20  2132   PROTIME seconds 14 8* 14 3   INR  1 17 1 12     Results from last 7 days   Lab Units 12/21/20  2132   LACTIC ACID mmol/L 1 3     Results from last 7 days   Lab Units 12/21/20  2132   BNP pg/mL 26     Results from last 7 days   Lab Units 12/21/20 2006   CRP mg/L <5 0     Results from last 7 days   Lab Units 12/21/20  2227   CLARITY UA  Clear   COLOR UA  Yellow   SPEC GRAV UA  >=1 030*   PH UA  5 0   GLUCOSE UA mg/dl Trace*   KETONES UA mg/dl Trace*   BLOOD UA  Negative   PROTEIN UA mg/dl Trace*   NITRITE UA  Negative   BILIRUBIN UA  Negative   UROBILINOGEN UA E U /dl 0 2   LEUKOCYTES UA  1+*   WBC UA /hpf 4-10*   RBC UA /hpf 1-2   BACTERIA UA /hpf Moderate*   EPITHELIAL CELLS WET PREP /hpf Innumerable*   MUCUS THREADS  Occasional*     Results from last 7 days   Lab Units 12/21/20 2006   INFLUENZA A PCR  Negative   INFLUENZA B PCR  Negative   RSV PCR  Negative     12/21  Cxr=No infiltrates are seen  Xray R humerus=Obliquely oriented fracture of the proximal humeral shaft and neck with angulation and mild offset  Ct head=No acute intracranial abnormality      ED Treatment:   Medication Administration from 12/21/2020 1830 to 12/21/2020 2251       Date/Time Order Dose Route Action     12/21/2020 2015 sodium chloride 0 9 % bolus 1,000 mL 1,000 mL Intravenous New Bag        Past Medical History:   Diagnosis Date    Coronary artery disease     Diabetes mellitus ([address] )     Hypertension      Present on Admission:   Syncope and collapse   Uncontrolled type 2 diabetes mellitus with hyperglycemia ([facility] )   COVID-19 virus infection   Acute kidney injury due to COVID-19 ([facility] )   Hypotension, unspecified    Admitting Diagnosis: Syncope and collapse [R55]  Dehydration [E86 0]  Syncope [R55]  Acute kidney injury ([facility] ) [N17 9]  Fracture of right humerus with routine healing [S42 301D]  COVID-19 [U07 1]  Age/Sex: 65 y o  female  Admission Orders:  Contact & airborne isolation  accuchecks with coverage scale  Telemetry  Neuro checks q4h x 24hrs  Consult cardio  Consult renal  Pt/ot eval & tx  scd  O2 to keep sats>90%  Incentive spirometry  Consult ortho    Scheduled Medications:  ascorbic acid, 1,000 mg, Oral, Q12H [facility]  cholecalciferol, 2,000 Units, Oral, Daily  enoxaparin, 40 mg, Subcutaneous, Daily  famotidine, 20 mg, Oral, Daily  insulin aspart protamine-insulin aspart, 45 Units, Subcutaneous, BID AC  insulin lispro, 1-5 Units, Subcutaneous, TID AC  insulin lispro, 1-5 Units, Subcutaneous, HS  zinc sulfate, 220 mg, Oral, Daily    Followed by  Denver Beal ON 12/29/2020] multivitamin-minerals, 1 tablet, Oral, Daily    Continuous IV Infusions:  sodium chloride, 100 mL/hr, Intravenous, Continuous    PRN Meds:  acetaminophen, 650 mg, Oral, Q6H PRN  ondansetron, 4 mg, Intravenous, Q6H PRN    Network Utilization Review Department  ATTENTION: Please call with any questions or concerns to [phone] and carefully listen to the prompts so that you are directed to the right person  All voicemails are confidential   Elise Cast all requests for admission clinical reviews, approved or denied determinations and any other requests to dedicated fax number below belonging to the campus where the patient is receiving treatment   List of dedicated fax numbers for the Facilities:  [address] DENIALS (Administrative/Medical Necessity) [phone]   [address] (Maternity/NICU/Pediatrics) 261 [facility] Floor [phone]   [address] [phone] Noemy Castellanos [address] (Jin Fischer) [address] [place] [address] Gilda Salas [facility] [phone]   [address] 951 [phone]

## 2025-06-11 DIAGNOSIS — E11.65 UNCONTROLLED TYPE 2 DIABETES MELLITUS WITH HYPERGLYCEMIA (HCC): ICD-10-CM

## 2025-06-11 RX ORDER — HUMAN INSULIN 100 [IU]/ML
75 INJECTION, SUSPENSION SUBCUTANEOUS
Qty: 100 ML | Refills: 0 | Status: SHIPPED | OUTPATIENT
Start: 2025-06-11

## 2025-06-13 ENCOUNTER — APPOINTMENT (OUTPATIENT)
Dept: LAB | Facility: CLINIC | Age: 75
End: 2025-06-13
Attending: FAMILY MEDICINE
Payer: COMMERCIAL

## 2025-06-13 DIAGNOSIS — E11.65 UNCONTROLLED TYPE 2 DIABETES MELLITUS WITH HYPERGLYCEMIA (HCC): ICD-10-CM

## 2025-06-13 PROCEDURE — 80061 LIPID PANEL: CPT

## 2025-06-13 PROCEDURE — 82043 UR ALBUMIN QUANTITATIVE: CPT

## 2025-06-13 PROCEDURE — 82570 ASSAY OF URINE CREATININE: CPT

## 2025-06-13 PROCEDURE — 36415 COLL VENOUS BLD VENIPUNCTURE: CPT

## 2025-06-13 PROCEDURE — 84443 ASSAY THYROID STIM HORMONE: CPT

## 2025-06-13 PROCEDURE — 80053 COMPREHEN METABOLIC PANEL: CPT

## 2025-06-13 PROCEDURE — 83036 HEMOGLOBIN GLYCOSYLATED A1C: CPT

## 2025-06-14 LAB
ALBUMIN SERPL BCG-MCNC: 4.1 G/DL (ref 3.5–5)
ALP SERPL-CCNC: 49 U/L (ref 34–104)
ALT SERPL W P-5'-P-CCNC: 11 U/L (ref 7–52)
ANION GAP SERPL CALCULATED.3IONS-SCNC: 8 MMOL/L (ref 4–13)
AST SERPL W P-5'-P-CCNC: 15 U/L (ref 13–39)
BILIRUB SERPL-MCNC: 0.86 MG/DL (ref 0.2–1)
BUN SERPL-MCNC: 19 MG/DL (ref 5–25)
CALCIUM SERPL-MCNC: 9.2 MG/DL (ref 8.4–10.2)
CHLORIDE SERPL-SCNC: 105 MMOL/L (ref 96–108)
CHOLEST SERPL-MCNC: 104 MG/DL (ref ?–200)
CO2 SERPL-SCNC: 28 MMOL/L (ref 21–32)
CREAT SERPL-MCNC: 1 MG/DL (ref 0.6–1.3)
CREAT UR-MCNC: 42.8 MG/DL
EST. AVERAGE GLUCOSE BLD GHB EST-MCNC: 246 MG/DL
GFR SERPL CREATININE-BSD FRML MDRD: 54 ML/MIN/1.73SQ M
GLUCOSE P FAST SERPL-MCNC: 165 MG/DL (ref 65–99)
HBA1C MFR BLD: 10.2 %
HDLC SERPL-MCNC: 24 MG/DL
LDLC SERPL CALC-MCNC: 56 MG/DL (ref 0–100)
MICROALBUMIN UR-MCNC: 19.4 MG/L
MICROALBUMIN/CREAT 24H UR: 45 MG/G CREATININE (ref 0–30)
POTASSIUM SERPL-SCNC: 4 MMOL/L (ref 3.5–5.3)
PROT SERPL-MCNC: 7.1 G/DL (ref 6.4–8.4)
SODIUM SERPL-SCNC: 141 MMOL/L (ref 135–147)
TRIGL SERPL-MCNC: 118 MG/DL (ref ?–150)
TSH SERPL DL<=0.05 MIU/L-ACNC: 4.07 UIU/ML (ref 0.45–4.5)

## 2025-06-23 DIAGNOSIS — I10 ESSENTIAL HYPERTENSION: ICD-10-CM

## 2025-06-23 RX ORDER — AMLODIPINE BESYLATE 5 MG/1
5 TABLET ORAL DAILY
Qty: 90 TABLET | Refills: 0 | Status: SHIPPED | OUTPATIENT
Start: 2025-06-23

## 2025-06-26 ENCOUNTER — RA CDI HCC (OUTPATIENT)
Dept: RADIOLOGY | Facility: HOSPITAL | Age: 75
End: 2025-06-26

## 2025-07-11 ENCOUNTER — OFFICE VISIT (OUTPATIENT)
Dept: FAMILY MEDICINE CLINIC | Facility: CLINIC | Age: 75
End: 2025-07-11
Payer: COMMERCIAL

## 2025-07-11 VITALS
WEIGHT: 266.2 LBS | TEMPERATURE: 97.3 F | RESPIRATION RATE: 18 BRPM | BODY MASS INDEX: 44.35 KG/M2 | DIASTOLIC BLOOD PRESSURE: 60 MMHG | HEART RATE: 70 BPM | HEIGHT: 65 IN | OXYGEN SATURATION: 97 % | SYSTOLIC BLOOD PRESSURE: 110 MMHG

## 2025-07-11 DIAGNOSIS — F33.9 DEPRESSION, RECURRENT (HCC): ICD-10-CM

## 2025-07-11 DIAGNOSIS — I10 ESSENTIAL HYPERTENSION: ICD-10-CM

## 2025-07-11 DIAGNOSIS — E86.1 HYPOTENSION DUE TO HYPOVOLEMIA: Primary | ICD-10-CM

## 2025-07-11 DIAGNOSIS — F41.9 ANXIETY AND DEPRESSION: ICD-10-CM

## 2025-07-11 DIAGNOSIS — I25.119 CORONARY ARTERY DISEASE WITH ANGINA PECTORIS, UNSPECIFIED VESSEL OR LESION TYPE, UNSPECIFIED WHETHER NATIVE OR TRANSPLANTED HEART (HCC): ICD-10-CM

## 2025-07-11 DIAGNOSIS — E11.65 UNCONTROLLED TYPE 2 DIABETES MELLITUS WITH HYPERGLYCEMIA (HCC): ICD-10-CM

## 2025-07-11 DIAGNOSIS — F32.A ANXIETY AND DEPRESSION: ICD-10-CM

## 2025-07-11 DIAGNOSIS — Z12.31 VISIT FOR SCREENING MAMMOGRAM: ICD-10-CM

## 2025-07-11 DIAGNOSIS — E78.2 MIXED HYPERLIPIDEMIA: ICD-10-CM

## 2025-07-11 DIAGNOSIS — N18.31 STAGE 3A CHRONIC KIDNEY DISEASE (HCC): ICD-10-CM

## 2025-07-11 PROCEDURE — 99214 OFFICE O/P EST MOD 30 MIN: CPT | Performed by: FAMILY MEDICINE

## 2025-07-11 PROCEDURE — G2211 COMPLEX E/M VISIT ADD ON: HCPCS | Performed by: FAMILY MEDICINE

## 2025-07-11 RX ORDER — ATORVASTATIN CALCIUM 40 MG/1
40 TABLET, FILM COATED ORAL DAILY
Qty: 90 TABLET | Refills: 3 | Status: SHIPPED | OUTPATIENT
Start: 2025-07-11

## 2025-07-11 RX ORDER — LISINOPRIL AND HYDROCHLOROTHIAZIDE 12.5; 2 MG/1; MG/1
1 TABLET ORAL DAILY
Qty: 90 TABLET | Refills: 1 | Status: SHIPPED | OUTPATIENT
Start: 2025-07-11

## 2025-07-11 RX ORDER — HUMAN INSULIN 100 [IU]/ML
90 INJECTION, SUSPENSION SUBCUTANEOUS
Qty: 120 ML | Refills: 3 | Status: SHIPPED | OUTPATIENT
Start: 2025-07-11

## 2025-07-11 NOTE — ASSESSMENT & PLAN NOTE
Stable lipid profile continue atorvastatin 40 mg reevaluate next visit    Orders:    atorvastatin (LIPITOR) 40 mg tablet; Take 1 tablet (40 mg total) by mouth daily    lisinopril-hydrochlorothiazide (PRINZIDE,ZESTORETIC) 20-12.5 MG per tablet; Take 1 tablet by mouth daily    Comprehensive metabolic panel; Future    Lipid panel; Future    TSH, 3rd generation with Free T4 reflex; Future    Hemoglobin A1C; Future

## 2025-07-11 NOTE — ASSESSMENT & PLAN NOTE
Stable currently doing well off medication sleep patterns and mood pattern stable follow-up 3 months

## 2025-07-11 NOTE — ASSESSMENT & PLAN NOTE
Stable no change in medication regimen continue as directed    Orders:    lisinopril-hydrochlorothiazide (PRINZIDE,ZESTORETIC) 20-12.5 MG per tablet; Take 1 tablet by mouth daily

## 2025-07-11 NOTE — ASSESSMENT & PLAN NOTE
Discussed and reviewed laboratory work A1c at 10.2 I would like to increase the insulin now that she is off the metformin her bowels have improved she is not having diarrhea and she is able to get around more.  We will need to increase the insulin dosage by 15 units twice daily  Lab Results   Component Value Date    HGBA1C 10.2 (H) 06/13/2025       Orders:    lisinopril-hydrochlorothiazide (PRINZIDE,ZESTORETIC) 20-12.5 MG per tablet; Take 1 tablet by mouth daily    insulin NPH-insulin regular (NovoLIN 70/30 ReliOn) 100 units/mL subcutaneous injection; Inject 90 Units under the skin 2 (two) times a day before meals    Comprehensive metabolic panel; Future    Lipid panel; Future    TSH, 3rd generation with Free T4 reflex; Future    Hemoglobin A1C; Future

## 2025-07-11 NOTE — PROGRESS NOTES
Diabetic Foot Exam    Patient's shoes and socks removed.    Right Foot/Ankle   Right Foot Inspection  Skin Exam: skin normal and skin intact. No dry skin, no warmth, no callus, no erythema, no maceration, no abnormal color, no pre-ulcer, no ulcer and no callus.     Toe Exam: ROM and strength within normal limits.     Sensory   Vibration: intact  Proprioception: intact  Monofilament testing: intact    Vascular  Capillary refills: < 3 seconds  The right DP pulse is 2+. The right PT pulse is 2+.     Left Foot/Ankle  Left Foot Inspection  Skin Exam: skin normal and skin intact. No dry skin, no warmth, no erythema, no maceration, normal color, no pre-ulcer, no ulcer and no callus.     Toe Exam: ROM and strength within normal limits.     Sensory   Vibration: intact  Proprioception: intact  Monofilament testing: intact    Vascular  Capillary refills: < 3 seconds  The left DP pulse is 2+. The left PT pulse is 2+.     Assign Risk Category  No deformity present  No loss of protective sensation  No weak pulses  Risk: 0  Name: Gin Haynes      : 1950      MRN: [id number]  Encounter Provider: Fer Márquez DO  Encounter Date: 2025   Encounter department: [facility]  :  Assessment & Plan  Mixed hyperlipidemia  Stable lipid profile continue atorvastatin 40 mg reevaluate next visit    Orders:    atorvastatin (LIPITOR) 40 mg tablet; Take 1 tablet (40 mg total) by mouth daily    lisinopril-hydrochlorothiazide (PRINZIDE,ZESTORETIC) 20-12.5 MG per tablet; Take 1 tablet by mouth daily    Comprehensive metabolic panel; Future    Lipid panel; Future    TSH, 3rd generation with Free T4 reflex; Future    Hemoglobin A1C; Future    Uncontrolled type 2 diabetes mellitus with hyperglycemia (HCC)  Discussed and reviewed laboratory work A1c at 10.2 I would like to increase the insulin now that she is off the metformin her bowels have improved she is not having diarrhea and she is able to get around  more.  We will need to increase the insulin dosage by 15 units twice daily  Lab Results   Component Value Date    HGBA1C 10.2 (H) 06/13/2025       Orders:    lisinopril-hydrochlorothiazide (PRINZIDE,ZESTORETIC) 20-12.5 MG per tablet; Take 1 tablet by mouth daily    insulin NPH-insulin regular (NovoLIN 70/30 ReliOn) 100 units/mL subcutaneous injection; Inject 90 Units under the skin 2 (two) times a day before meals    Comprehensive metabolic panel; Future    Lipid panel; Future    TSH, 3rd generation with Free T4 reflex; Future    Hemoglobin A1C; Future    Essential hypertension    Orders:    lisinopril-hydrochlorothiazide (PRINZIDE,ZESTORETIC) 20-12.5 MG per tablet; Take 1 tablet by mouth daily    Coronary artery disease with angina pectoris, unspecified vessel or lesion type, unspecified whether native or transplanted heart (HCC)  Stable no change in medication regimen continue as directed    Orders:    lisinopril-hydrochlorothiazide (PRINZIDE,ZESTORETIC) 20-12.5 MG per tablet; Take 1 tablet by mouth daily    Anxiety and depression      Orders:    lisinopril-hydrochlorothiazide (PRINZIDE,ZESTORETIC) 20-12.5 MG per tablet; Take 1 tablet by mouth daily    Visit for screening mammogram    Orders:    lisinopril-hydrochlorothiazide (PRINZIDE,ZESTORETIC) 20-12.5 MG per tablet; Take 1 tablet by mouth daily    Hypotension due to hypovolemia  Stable no symptomatology recently continue lisinopril hydrochlorothiazide along with amlodipine         Stage 3a chronic kidney disease (HCC)  Lab Results   Component Value Date    EGFR 54 06/13/2025    EGFR 56 08/14/2024    EGFR 52 08/07/2023    CREATININE 1.00 06/13/2025    CREATININE 0.98 08/14/2024    CREATININE 1.05 08/07/2023   Renal function stable GFR 54 creatinine 1.0 stable follow-up with lab work in 3 months         Depression, recurrent (HCC)  Stable currently doing well off medication sleep patterns and mood pattern stable follow-up 3 months                History of  "Present Illness   Patient here for follow-up evaluation 3-month checkup and lab review      Review of Systems   Constitutional:  Negative for chills, fatigue and fever.   HENT:  Negative for congestion, nosebleeds, rhinorrhea, sinus pressure and sore throat.    Eyes:  Negative for discharge and redness.   Respiratory:  Negative for cough and shortness of breath.    Cardiovascular:  Negative for chest pain, palpitations and leg swelling.   Gastrointestinal:  Negative for abdominal pain, blood in stool and nausea.   Endocrine: Negative for cold intolerance, heat intolerance and polyuria.   Genitourinary:  Negative for dysuria and frequency.   Musculoskeletal:  Negative for arthralgias, back pain and myalgias.   Skin:  Negative for rash.   Neurological:  Negative for dizziness, weakness and headaches.   Hematological:  Negative for adenopathy.   Psychiatric/Behavioral:  Negative for behavioral problems and sleep disturbance. The patient is not nervous/anxious.        Objective   /60 (BP Location: Left arm, Patient Position: Sitting, Cuff Size: Standard)   Pulse 70   Temp (!) 97.3 °F (36.3 °C) (Tympanic)   Resp 18   Ht 5' 5\" (1.651 m)   Wt 121 kg (266 lb 3.2 oz)   SpO2 97%   BMI 44.30 kg/m²      Physical Exam  Vitals and nursing note reviewed.   Constitutional:       General: She is not in acute distress.     Appearance: Normal appearance. She is well-developed.   HENT:      Head: Normocephalic and atraumatic.      Right Ear: Tympanic membrane and external ear normal.      Left Ear: Tympanic membrane and external ear normal.      Nose: Nose normal.      Mouth/Throat:      Mouth: Mucous membranes are moist.      Pharynx: Oropharynx is clear. No oropharyngeal exudate.     Eyes:      General: No scleral icterus.        Right eye: No discharge.         Left eye: No discharge.      Conjunctiva/sclera: Conjunctivae normal.      Pupils: Pupils are equal, round, and reactive to light.     Neck:      Thyroid: No " thyromegaly.      Vascular: No JVD.     Cardiovascular:      Rate and Rhythm: Normal rate and regular rhythm.      Pulses: no weak pulses.           Dorsalis pedis pulses are 2+ on the right side and 2+ on the left side.        Posterior tibial pulses are 2+ on the right side and 2+ on the left side.      Heart sounds: Normal heart sounds. No murmur heard.  Pulmonary:      Effort: Pulmonary effort is normal.      Breath sounds: No wheezing or rales.   Chest:      Chest wall: No tenderness.   Abdominal:      General: Bowel sounds are normal. There is no distension.      Palpations: Abdomen is soft. There is no mass.      Tenderness: There is no abdominal tenderness.     Musculoskeletal:         General: No tenderness or deformity. Normal range of motion.      Cervical back: Normal range of motion.   Feet:      Right foot:      Skin integrity: No ulcer, skin breakdown, erythema, warmth, callus or dry skin.      Left foot:      Skin integrity: No ulcer, skin breakdown, erythema, warmth, callus or dry skin.   Lymphadenopathy:      Cervical: No cervical adenopathy.     Skin:     General: Skin is warm and dry.      Findings: No rash.     Neurological:      General: No focal deficit present.      Mental Status: She is alert and oriented to person, place, and time.      Cranial Nerves: No cranial nerve deficit.      Coordination: Coordination normal.      Deep Tendon Reflexes: Reflexes are normal and symmetric. Reflexes normal.     Psychiatric:         Mood and Affect: Mood normal.         Behavior: Behavior normal.         Thought Content: Thought content normal.         Judgment: Judgment normal.

## 2025-07-11 NOTE — ASSESSMENT & PLAN NOTE
Lab Results   Component Value Date    EGFR 54 06/13/2025    EGFR 56 08/14/2024    EGFR 52 08/07/2023    CREATININE 1.00 06/13/2025    CREATININE 0.98 08/14/2024    CREATININE 1.05 08/07/2023   Renal function stable GFR 54 creatinine 1.0 stable follow-up with lab work in 3 months

## 2025-07-11 NOTE — ASSESSMENT & PLAN NOTE
Orders:    lisinopril-hydrochlorothiazide (PRINZIDE,ZESTORETIC) 20-12.5 MG per tablet; Take 1 tablet by mouth daily     Refill completed in separate encounter.

## 2025-07-11 NOTE — ASSESSMENT & PLAN NOTE
Stable no symptomatology recently continue lisinopril hydrochlorothiazide along with amlodipine